# Patient Record
Sex: FEMALE | Race: WHITE | Employment: OTHER | URBAN - METROPOLITAN AREA
[De-identification: names, ages, dates, MRNs, and addresses within clinical notes are randomized per-mention and may not be internally consistent; named-entity substitution may affect disease eponyms.]

---

## 2017-02-07 ENCOUNTER — OFFICE VISIT (OUTPATIENT)
Dept: FAMILY MEDICINE | Facility: CLINIC | Age: 42
End: 2017-02-07
Payer: COMMERCIAL

## 2017-02-07 ENCOUNTER — TRANSFERRED RECORDS (OUTPATIENT)
Dept: HEALTH INFORMATION MANAGEMENT | Facility: CLINIC | Age: 42
End: 2017-02-07

## 2017-02-07 VITALS
HEIGHT: 63 IN | TEMPERATURE: 97.8 F | WEIGHT: 153 LBS | BODY MASS INDEX: 27.11 KG/M2 | DIASTOLIC BLOOD PRESSURE: 68 MMHG | RESPIRATION RATE: 16 BRPM | OXYGEN SATURATION: 99 % | SYSTOLIC BLOOD PRESSURE: 110 MMHG | HEART RATE: 83 BPM

## 2017-02-07 DIAGNOSIS — F33.0 MAJOR DEPRESSIVE DISORDER, RECURRENT EPISODE, MILD (H): ICD-10-CM

## 2017-02-07 DIAGNOSIS — E06.3 HYPOTHYROIDISM DUE TO HASHIMOTO'S THYROIDITIS: ICD-10-CM

## 2017-02-07 DIAGNOSIS — R22.9 LOCALIZED SUPERFICIAL SWELLING, MASS, OR LUMP: Primary | ICD-10-CM

## 2017-02-07 DIAGNOSIS — F10.11 ALCOHOL USE DISORDER, MILD, IN SUSTAINED REMISSION: ICD-10-CM

## 2017-02-07 PROCEDURE — 36415 COLL VENOUS BLD VENIPUNCTURE: CPT | Performed by: FAMILY MEDICINE

## 2017-02-07 PROCEDURE — 85652 RBC SED RATE AUTOMATED: CPT | Performed by: FAMILY MEDICINE

## 2017-02-07 PROCEDURE — 86431 RHEUMATOID FACTOR QUANT: CPT | Performed by: FAMILY MEDICINE

## 2017-02-07 PROCEDURE — 99213 OFFICE O/P EST LOW 20 MIN: CPT | Performed by: FAMILY MEDICINE

## 2017-02-07 NOTE — NURSING NOTE
"Chief Complaint   Patient presents with     Mass     right finger       Initial /68 mmHg  Pulse 83  Temp(Src) 97.8  F (36.6  C) (Tympanic)  Resp 16  Ht 5' 3\" (1.6 m)  Wt 153 lb (69.4 kg)  BMI 27.11 kg/m2  SpO2 99%  LMP 01/24/2017 Estimated body mass index is 27.11 kg/(m^2) as calculated from the following:    Height as of this encounter: 5' 3\" (1.6 m).    Weight as of this encounter: 153 lb (69.4 kg).  Medication Reconciliation: complete   .Car FUENTES      "

## 2017-02-07 NOTE — PROGRESS NOTES
"  SUBJECTIVE:                                                    Deysi Douglas is a 41 year old female who presents to clinic today for the following health issues:      Lump on finger      Duration: finger hurt about 6 months, in the last1 month bump has formed    Description (location/character/radiation): right finger    Intensity:  moderate    Accompanying signs and symptoms: growing in size, hurts down finger into hand    History (similar episodes/previous evaluation): None    Precipitating or alleviating factors: resting it    Therapies tried and outcome: None     41 year old healthy female with history of hypothyroidism, well controlled, and remote history of depression, anxiety and alcohol use disorder all in remission currently, here today for right finger pain.  Plays pianos, skis, types all which seem to strain finger.  For last 6 months or so has had pain in right middle finger middle joint, then had lump that developed.  Painful from time to time, occ wakes her at night.  Growing slowly, not sure if waxes and wanes?  Does have fam history of RA in maternal aunt, otherwise none.      Problem list and histories reviewed & adjusted, as indicated.  Additional history: as documented    BP Readings from Last 3 Encounters:   02/07/17 110/68   12/02/16 110/64   11/01/16 110/68    Wt Readings from Last 3 Encounters:   02/07/17 153 lb (69.4 kg)   12/02/16 154 lb (69.854 kg)   11/01/16 152 lb 3.2 oz (69.037 kg)                    ROS:  Constitutional, HEENT, cardiovascular, pulmonary, gi and gu systems are negative, except as otherwise noted.    OBJECTIVE:                                                    /68 mmHg  Pulse 83  Temp(Src) 97.8  F (36.6  C) (Tympanic)  Resp 16  Ht 5' 3\" (1.6 m)  Wt 153 lb (69.4 kg)  BMI 27.11 kg/m2  SpO2 99%  LMP 01/24/2017  Body mass index is 27.11 kg/(m^2).  GENERAL: healthy, alert and no distress  Right Hand/Wrist Exam     Range of Motion   Range of motion is " normal right wrist ROM.  Hand  DIP Thumb:  DIP Index:   DIP Middle: Normal  DIP Ring:   DIP Little:   MP Thumb:   MP Index:    MP Middle:   Normal  MP Ring:       MP Little:       PIP Index:     PIP Middle:  Normal  PIP Ring:      PIP Little:        Muscle Strength   Normal right wrist strength    Comments:  Normal ROM but with non illuminating circular hard mass about pea sized, non mobile, right middle finger PIP          Diagnostic Test Results:  Unresulted Labs Ordered in the Past 30 Days of this Admission     Date and Time Order Name Status Description    2/7/2017 1443 RHEUMATOID FACTOR In process     2/7/2017 1443 ERYTHROCYTE SEDIMENTATION RATE AUTO In process              ASSESSMENT/PLAN:                                                          ICD-10-CM    1. Localized superficial swelling, mass, or lump R22.9 XR Finger Right G/E 2 Views     ESR: Erythrocyte sedimentation rate     Rheumatoid factor   2. Hypothyroidism due to Hashimoto's thyroiditis E03.8     E06.3    3. Major depressive disorder, recurrent episode, mild (H) F33.0    4. Alcohol use disorder, mild, in sustained remission (H) F10.10      --For lump, discussed differential including ganglion cyst, heberden's node, and much less likely tumor or neoplasm.    --Will get xray - ordered, pt will come back to have done.  --Will get labs to rule out RA - doesn't seem like based on symptoms, but with family history and anxiety around this pt prefers testing.  --Will follow up with plan based on results.      Payton Horn MD  [facility]

## 2017-02-08 LAB
ERYTHROCYTE [SEDIMENTATION RATE] IN BLOOD BY WESTERGREN METHOD: 12 MM/H (ref 0–20)
RHEUMATOID FACT SER NEPH-ACNC: <20 IU/ML (ref 0–20)

## 2017-02-10 ENCOUNTER — APPOINTMENT (OUTPATIENT)
Dept: GENERAL RADIOLOGY | Facility: CLINIC | Age: 42
End: 2017-02-10
Payer: COMMERCIAL

## 2017-02-10 ENCOUNTER — RADIANT APPOINTMENT (OUTPATIENT)
Dept: GENERAL RADIOLOGY | Facility: CLINIC | Age: 42
End: 2017-02-10
Attending: FAMILY MEDICINE
Payer: COMMERCIAL

## 2017-02-10 DIAGNOSIS — M67.441 DIGITAL MUCINOUS CYST OF FINGER OF RIGHT HAND: ICD-10-CM

## 2017-02-10 PROCEDURE — 73130 X-RAY EXAM OF HAND: CPT | Mod: RT

## 2017-03-09 ENCOUNTER — TELEPHONE (OUTPATIENT)
Dept: FAMILY MEDICINE | Facility: CLINIC | Age: 42
End: 2017-03-09

## 2017-03-10 ENCOUNTER — MYC MEDICAL ADVICE (OUTPATIENT)
Dept: FAMILY MEDICINE | Facility: CLINIC | Age: 42
End: 2017-03-10

## 2017-03-10 DIAGNOSIS — E06.3 HYPOTHYROIDISM DUE TO HASHIMOTO'S THYROIDITIS: Primary | ICD-10-CM

## 2017-03-10 DIAGNOSIS — R19.7 DIARRHEA, UNSPECIFIED TYPE: ICD-10-CM

## 2017-03-10 DIAGNOSIS — E06.3 HYPOTHYROIDISM DUE TO HASHIMOTO'S THYROIDITIS: ICD-10-CM

## 2017-03-10 PROCEDURE — 36415 COLL VENOUS BLD VENIPUNCTURE: CPT | Performed by: FAMILY MEDICINE

## 2017-03-10 PROCEDURE — 84443 ASSAY THYROID STIM HORMONE: CPT | Performed by: FAMILY MEDICINE

## 2017-03-10 PROCEDURE — 83516 IMMUNOASSAY NONANTIBODY: CPT | Performed by: FAMILY MEDICINE

## 2017-03-10 NOTE — TELEPHONE ENCOUNTER
Please see [employer] message and let us know how we can help,  Mary Bill RN- Triage [employer]

## 2017-03-10 NOTE — TELEPHONE ENCOUNTER
There is a mychart encounter that has been forwarded to Dr Horn  Close encounter  Mary Bill RN- Triage [employer]

## 2017-03-10 NOTE — TELEPHONE ENCOUNTER
Could you let Deysi know I ordered those tests; she can come in for lab only?  Thanks!  Payton Horn MD

## 2017-03-13 LAB — TSH SERPL DL<=0.005 MIU/L-ACNC: 1.9 MU/L (ref 0.4–4)

## 2017-03-14 LAB
TTG IGA SER-ACNC: 1 U/ML
TTG IGG SER-ACNC: 1 U/ML

## 2017-03-14 NOTE — PROGRESS NOTES
Your thyroid results are normal - celiac still pending.  Let me know if you have any questions.  Payton Horn MD

## 2017-03-21 ENCOUNTER — TRANSFERRED RECORDS (OUTPATIENT)
Dept: HEALTH INFORMATION MANAGEMENT | Facility: CLINIC | Age: 42
End: 2017-03-21

## 2017-03-22 ENCOUNTER — OFFICE VISIT (OUTPATIENT)
Dept: FAMILY MEDICINE | Facility: CLINIC | Age: 42
End: 2017-03-22
Payer: COMMERCIAL

## 2017-03-22 VITALS
OXYGEN SATURATION: 98 % | DIASTOLIC BLOOD PRESSURE: 64 MMHG | TEMPERATURE: 97.8 F | HEART RATE: 74 BPM | WEIGHT: 151.5 LBS | HEIGHT: 63 IN | SYSTOLIC BLOOD PRESSURE: 100 MMHG | BODY MASS INDEX: 26.84 KG/M2 | RESPIRATION RATE: 16 BRPM

## 2017-03-22 DIAGNOSIS — F33.0 MAJOR DEPRESSIVE DISORDER, RECURRENT EPISODE, MILD (H): ICD-10-CM

## 2017-03-22 DIAGNOSIS — E06.3 HYPOTHYROIDISM DUE TO HASHIMOTO'S THYROIDITIS: ICD-10-CM

## 2017-03-22 DIAGNOSIS — F41.1 GAD (GENERALIZED ANXIETY DISORDER): Primary | ICD-10-CM

## 2017-03-22 PROCEDURE — 99214 OFFICE O/P EST MOD 30 MIN: CPT | Performed by: FAMILY MEDICINE

## 2017-03-22 NOTE — PROGRESS NOTES
"  SUBJECTIVE:                                                    Deysi Douglas is a 42 year old female who presents to clinic today for the following health issues:    Pt is here to go over lab results from her 3-10-17 apt    Went to see endocrinologist on Tuesday.  Saw her yesterday and she changed her levothyroxine to synthroid and upped dose slightly to 137.      42 year old female with anxiety and hypothyroidism here to discuss thyroid issues and anxiety.  Has made several diet changes she'd like to discuss, and saw endocrinologist yesterday and wants to update me on this.      Problem list and histories reviewed & adjusted, as indicated.  Additional history: as documented    BP Readings from Last 3 Encounters:   03/22/17 100/64   02/07/17 110/68   12/02/16 110/64    Wt Readings from Last 3 Encounters:   03/22/17 151 lb 8 oz (68.7 kg)   02/07/17 153 lb (69.4 kg)   12/02/16 154 lb (69.9 kg)                    Reviewed and updated as needed this visit by clinical staff  Tobacco  Allergies  Med Hx  Surg Hx  Fam Hx  Soc Hx      Reviewed and updated as needed this visit by Provider         ROS:  Constitutional, HEENT, cardiovascular, pulmonary, gi and gu systems are negative, except as otherwise noted.    OBJECTIVE:                                                    /64  Pulse 74  Temp 97.8  F (36.6  C) (Tympanic)  Resp 16  Ht 5' 3\" (1.6 m)  Wt 151 lb 8 oz (68.7 kg)  LMP 03/11/2017 (Approximate)  SpO2 98%  BMI 26.84 kg/m2  Body mass index is 26.84 kg/(m^2).  GENERAL: healthy, alert and no distress  MS: no gross musculoskeletal defects noted, no edema  SKIN: no suspicious lesions or rashes  NEURO: Normal strength and tone, mentation intact and speech normal  MENTAL STATUS EXAM:   Deysi is casually dressed and well groomed, sitting comfortably during encounter.  she is alert, awake, and in no acute distress. Eye contact is good. Speech is rapid, pleasantly tangential at baseline no more than " "usual. Psychomotor activity is within normal limits and there are no abnormal involuntary movements demonstrated. Mood is \"good but anxious\" and affect is euthymic with good range and reactivity. [place] is generallypositive and hopeful despite recent stressors. Thought process is linear, logical, and goal directed. Thought content is free of suicidal or homicidal ideation, hallucinations or other symptoms of psychosis. Insight and judgement are fair to good. she is generally oriented. No difficulty with memory, attention, or cognition. Associations intact, gait and station within normal limits.       ASSESSMENT/PLAN:                                                      Deysi was seen today for lab only.    Diagnoses and all orders for this visit:    MARCELO (generalized anxiety disorder)    Hypothyroidism due to Hashimoto's thyroiditis    Major depressive disorder, recurrent episode, mild (H)      Plan:   --Continue your levothyroxine as you are.   --Work on CBT techniques for anxiety - question your thoughts/worries reality, deep breathing, stay in moment, etc.   --Gave her a few resource/books for dealing with anxiety.   --Follow up with us as needed.  Pt NOT interested in medication change although I certainly think an SSRI could be helpful for her rather than her wellbutrin, or even buspar.  Will continue to discuss as pt's story and coping mechanisms evolve.   --Recommend therapy, yoga, meditation.      Greater than 25 minutes were spent on this visit, of which more than 50% was spent on counseling and coordination of care.  Please see plan above.    Payton Horn MD  [facility]    "

## 2017-03-22 NOTE — MR AVS SNAPSHOT
After Visit Summary   3/22/2017    Deysi Douglas    MRN: [id number]           Patient Information     Date Of Birth          1975        Visit Information        Provider Department      3/22/2017 1:15 PM Payton Horn MD [facility]        Today's Diagnoses     MARCELO (generalized anxiety disorder)    -  1    Hypothyroidism due to Hashimoto's thyroiditis        Major depressive disorder, recurrent episode, mild (H)           Follow-ups after your visit        Who to contact     If you have questions or need follow up information about today's clinic visit or your schedule please contact [facility] directly at [phone].  Normal or non-critical lab and imaging results will be communicated to you by MyChart, letter or phone within 4 business days after the clinic has received the results. If you do not hear from us within 7 days, please contact the clinic through [employer] or phone. If you have a critical or abnormal lab result, we will notify you by phone as soon as possible.  Submit refill requests through [employer] or call your pharmacy and they will forward the refill request to us. Please allow 3 business days for your refill to be completed.          Additional Information About Your Visit        MyChart Information     [employer] gives you secure access to your electronic health record. If you see a primary care provider, you can also send messages to your care team and make appointments. If you have questions, please call your primary care clinic.  If you do not have a primary care provider, please call [phone] and they will assist you.        Care EveryWhere ID     This is your Care EveryWhere ID. This could be used by other organizations to access your [facility] medical records  [id number]        Your Vitals Were     Pulse Temperature Respirations Height Last Period Pulse Oximetry    74 97.8  F (36.6  C)  "(Tympanic) 16 5' 3\" (1.6 m) 03/11/2017 (Approximate) 98%    BMI (Body Mass Index)                   26.84 kg/m2            Blood Pressure from Last 3 Encounters:   03/22/17 100/64   02/07/17 110/68   12/02/16 110/64    Weight from Last 3 Encounters:   03/22/17 151 lb 8 oz (68.7 kg)   02/07/17 153 lb (69.4 kg)   12/02/16 154 lb (69.9 kg)              Today, you had the following     No orders found for display       Primary Care Provider Office Phone # Fax #    Payton Horn [phone]       [address] [address]        Thank you!     Thank you for choosing [facility]  for your care. Our goal is always to provide you with excellent care. Hearing back from our patients is one way we can continue to improve our services. Please take a few minutes to complete the written survey that you may receive in the mail after your visit with us. Thank you!             Your Updated Medication List - Protect others around you: Learn how to safely use, store and throw away your medicines at www.disposemymeds.org.          This list is accurate as of: 3/22/17 11:59 PM.  Always use your most recent med list.                   Brand Name Dispense Instructions for use    ALPRAZolam 0.5 MG tablet    XANAX    30 tablet    Take 1 tablet (0.5 mg) by mouth 3 times daily as needed for anxiety       buPROPion 100 MG 12 hr tablet    WELLBUTRIN SR    180 tablet    Take 1 tablet (100 mg) by mouth 2 times daily       calcium + D 600-200 MG-UNIT Tabs   Generic drug:  calcium carbonate-vitamin D      2 TABLETS DAILY       fluticasone 50 MCG/ACT spray    FLONASE    16 g    Spray 1-2 sprays into both nostrils daily       levothyroxine 125 MCG tablet    SYNTHROID/LEVOTHROID    90 tablet    Take 1 tablet (125 mcg) by mouth daily       ZYRTEC 10 MG tablet   Generic drug:  cetirizine      1 TABLET DAILY         "

## 2017-03-22 NOTE — NURSING NOTE
"Chief Complaint   Patient presents with     Lab Only     results from 3-10-17       Initial /64  Pulse 74  Temp 97.8  F (36.6  C) (Tympanic)  Resp 16  Ht 5' 3\" (1.6 m)  Wt 151 lb 8 oz (68.7 kg)  LMP 03/11/2017 (Approximate)  SpO2 98%  BMI 26.84 kg/m2 Estimated body mass index is 26.84 kg/(m^2) as calculated from the following:    Height as of this encounter: 5' 3\" (1.6 m).    Weight as of this encounter: 151 lb 8 oz (68.7 kg).  Medication Reconciliation: complete   .Car FUENTES      "

## 2017-05-08 ENCOUNTER — TELEPHONE (OUTPATIENT)
Dept: FAMILY MEDICINE | Facility: CLINIC | Age: 42
End: 2017-05-08

## 2017-05-08 DIAGNOSIS — F41.1 GAD (GENERALIZED ANXIETY DISORDER): Primary | ICD-10-CM

## 2017-05-08 NOTE — TELEPHONE ENCOUNTER
I spoke with patient  'having worse anxiety( situation in requards to buying a house and remorse in not doing so) and now anxiety   to clinic tomorrow for eval

## 2017-05-08 NOTE — TELEPHONE ENCOUNTER
Deysi asked to speak to you directly as she is having some problems with mental health issues and needed some advice. She can be reached at [phone].

## 2017-05-09 ENCOUNTER — HOSPITAL ENCOUNTER (EMERGENCY)
Facility: CLINIC | Age: 42
Discharge: HOME | End: 2017-05-09
Attending: PSYCHIATRY & NEUROLOGY | Admitting: PSYCHIATRY & NEUROLOGY
Payer: COMMERCIAL

## 2017-05-09 ENCOUNTER — TELEPHONE (OUTPATIENT)
Dept: FAMILY MEDICINE | Facility: CLINIC | Age: 42
End: 2017-05-09

## 2017-05-09 ENCOUNTER — OFFICE VISIT (OUTPATIENT)
Dept: FAMILY MEDICINE | Facility: CLINIC | Age: 42
End: 2017-05-09
Payer: COMMERCIAL

## 2017-05-09 VITALS
WEIGHT: 140.9 LBS | HEIGHT: 63 IN | SYSTOLIC BLOOD PRESSURE: 110 MMHG | OXYGEN SATURATION: 100 % | TEMPERATURE: 97.6 F | HEART RATE: 92 BPM | BODY MASS INDEX: 24.96 KG/M2 | DIASTOLIC BLOOD PRESSURE: 66 MMHG

## 2017-05-09 VITALS
WEIGHT: 141.8 LBS | OXYGEN SATURATION: 99 % | RESPIRATION RATE: 16 BRPM | TEMPERATURE: 97.7 F | BODY MASS INDEX: 25.12 KG/M2 | DIASTOLIC BLOOD PRESSURE: 80 MMHG | SYSTOLIC BLOOD PRESSURE: 128 MMHG | HEART RATE: 94 BPM

## 2017-05-09 DIAGNOSIS — F41.1 GAD (GENERALIZED ANXIETY DISORDER): ICD-10-CM

## 2017-05-09 DIAGNOSIS — F41.8 DEPRESSION WITH ANXIETY: ICD-10-CM

## 2017-05-09 DIAGNOSIS — F43.0 ACUTE REACTION TO STRESS: Primary | ICD-10-CM

## 2017-05-09 LAB
AMPHETAMINES UR QL SCN: ABNORMAL
BARBITURATES UR QL: ABNORMAL
BENZODIAZ UR QL: ABNORMAL
CANNABINOIDS UR QL SCN: ABNORMAL
COCAINE UR QL: ABNORMAL
ETHANOL UR QL SCN: ABNORMAL
HCG UR QL: NEGATIVE
OPIATES UR QL SCN: ABNORMAL

## 2017-05-09 PROCEDURE — 90791 PSYCH DIAGNOSTIC EVALUATION: CPT

## 2017-05-09 PROCEDURE — 99285 EMERGENCY DEPT VISIT HI MDM: CPT | Mod: 25 | Performed by: PSYCHIATRY & NEUROLOGY

## 2017-05-09 PROCEDURE — 81025 URINE PREGNANCY TEST: CPT | Performed by: EMERGENCY MEDICINE

## 2017-05-09 PROCEDURE — 80320 DRUG SCREEN QUANTALCOHOLS: CPT | Performed by: EMERGENCY MEDICINE

## 2017-05-09 PROCEDURE — 80307 DRUG TEST PRSMV CHEM ANLYZR: CPT | Performed by: EMERGENCY MEDICINE

## 2017-05-09 PROCEDURE — 99284 EMERGENCY DEPT VISIT MOD MDM: CPT | Mod: Z6 | Performed by: PSYCHIATRY & NEUROLOGY

## 2017-05-09 PROCEDURE — 99213 OFFICE O/P EST LOW 20 MIN: CPT | Performed by: FAMILY MEDICINE

## 2017-05-09 RX ORDER — BUPROPION HYDROCHLORIDE 100 MG/1
100 TABLET, EXTENDED RELEASE ORAL 2 TIMES DAILY
Qty: 180 TABLET | Refills: 1 | Status: SHIPPED | OUTPATIENT
Start: 2017-05-09 | End: 2017-06-20

## 2017-05-09 RX ORDER — LORAZEPAM 1 MG/1
1 TABLET ORAL EVERY 8 HOURS PRN
Qty: 20 TABLET | Refills: 0 | Status: SHIPPED | OUTPATIENT
Start: 2017-05-09 | End: 2017-09-29

## 2017-05-09 RX ORDER — BUPROPION HYDROCHLORIDE 100 MG/1
100 TABLET, EXTENDED RELEASE ORAL 3 TIMES DAILY
Qty: 90 TABLET | Refills: 0 | Status: SHIPPED | OUTPATIENT
Start: 2017-05-09 | End: 2017-06-20

## 2017-05-09 ASSESSMENT — ENCOUNTER SYMPTOMS
HALLUCINATIONS: 0
SHORTNESS OF BREATH: 0
FEVER: 0
DIZZINESS: 0
DYSPHORIC MOOD: 0
CHEST TIGHTNESS: 0
BACK PAIN: 0
NERVOUS/ANXIOUS: 1
ABDOMINAL PAIN: 0

## 2017-05-09 ASSESSMENT — ANXIETY QUESTIONNAIRES
1. FEELING NERVOUS, ANXIOUS, OR ON EDGE: NEARLY EVERY DAY
2. NOT BEING ABLE TO STOP OR CONTROL WORRYING: NEARLY EVERY DAY
3. WORRYING TOO MUCH ABOUT DIFFERENT THINGS: NEARLY EVERY DAY
7. FEELING AFRAID AS IF SOMETHING AWFUL MIGHT HAPPEN: NEARLY EVERY DAY
6. BECOMING EASILY ANNOYED OR IRRITABLE: NEARLY EVERY DAY
IF YOU CHECKED OFF ANY PROBLEMS ON THIS QUESTIONNAIRE, HOW DIFFICULT HAVE THESE PROBLEMS MADE IT FOR YOU TO DO YOUR WORK, TAKE CARE OF THINGS AT HOME, OR GET ALONG WITH OTHER PEOPLE: EXTREMELY DIFFICULT
5. BEING SO RESTLESS THAT IT IS HARD TO SIT STILL: NEARLY EVERY DAY
GAD7 TOTAL SCORE: 21

## 2017-05-09 ASSESSMENT — PATIENT HEALTH QUESTIONNAIRE - PHQ9: 5. POOR APPETITE OR OVEREATING: NEARLY EVERY DAY

## 2017-05-09 NOTE — ED AVS SNAPSHOT
[facility], Emergency Department    [address]    [facility] [address]    Phone:  [phone]    Fax:  [phone]                                       Deysi Douglas   MRN: [id number]    Department:  [facility], Emergency Department   Date of Visit:  5/9/2017           After Visit Summary Signature Page     I have received my discharge instructions, and my questions have been answered. I have discussed any challenges I see with this plan with the nurse or doctor.    ..........................................................................................................................................  Patient/Patient Representative Signature      ..........................................................................................................................................  Patient Representative Print Name and Relationship to Patient    ..................................................               ................................................  Date                                            Time    ..........................................................................................................................................  Reviewed by Signature/Title    ...................................................              ..............................................  Date                                                            Time

## 2017-05-09 NOTE — PROGRESS NOTES
"  SUBJECTIVE:                                                    Deysi Douglas is a 42 year old female who presents to clinic today for the following health issues:        Depression and Anxiety Follow-Up    Status since last visit: Worsened over the past 2 weeks ( felt great before that) with anxiety over considering buying new house, then felt overwhelmed by hte porcess, ended up canceling the purchase, now with regret    Denies  suicidal ideation, now more dressed than anxious    Other associated symptoms: no chemical use    Complicating factors:     Significant life event: No     Current substance abuse: None  Seeing therapist, trying to walk  Taking her we;llbutrin  PHQ-9 SCORE 6/7/2016 6/7/2016 12/2/2016   Total Score - - -   Total Score 2 3 1     MARCELO-7 SCORE 4/12/2016   Total Score 6        PHQ-9  English      PHQ-9   Any Language     GAD7       Amount of exercise or physical activity: everyday     Problems taking medications regularly: No    Medication side effects: none    Diet: regular (no restrictions)          Problem list and histories reviewed & adjusted, as indicated.  Additional history: as documented    Labs reviewed in EPIC    Reviewed and updated as needed this visit by clinical staff       Reviewed and updated as needed this visit by Provider         ROS:  Constitutional, HEENT, cardiovascular, pulmonary, gi and gu systems are negative, except as otherwise noted.    OBJECTIVE:                                                    /66  Pulse 92  Temp 97.6  F (36.4  C) (Oral)  Ht 5' 3\" (1.6 m)  Wt 140 lb 14.4 oz (63.9 kg)  SpO2 100%  BMI 24.96 kg/m2  Body mass index is 24.96 kg/(m^2).  GENERAL: alert and fatigued  EYES: Eyes grossly normal to inspection, PERRL and conjunctivae and sclerae normal  PSYCH: affect flat, tearful, anxious, fatigued and judgement and insight intact         ASSESSMENT/PLAN:                                                            1. Depression with " anxiety  With recent worsening, denies suicidal iddeation and agreees to no harm contract  -Ok short term use of LORazepam (ATIVAN) 1 MG tablet; Take 1 tablet (1 mg) by mouth every 8 hours as needed for anxiety  Dispense: 20 tablet; Refill: 0     - buPROPion (WELLBUTRIN SR) 100 MG 12 hr tablet; Take 1 tablet (100 mg) by mouth 3 times daily  Dispense: 90 tablet; Refill: 0    2. Acute reaction to stress  Urged therapy, exercise,     Call if worse  Follow up in 2 weeks.     Glenroy Trammell MD  [facility]      After visit patient called to state that she was in fact not taking her wellbutrin  Will doa taper , one tab daily times 3 days, then one tab bid for 3 days then 1 tid

## 2017-05-09 NOTE — ED AVS SNAPSHOT
[facility], Emergency Department    [address]    [place] [address]    Phone:  [phone]    Fax:  [phone]                                       Deysi Douglas   MRN: [id number]    Department:  [facility], Emergency Department   Date of Visit:  5/9/2017           Patient Information     Date Of Birth          1975        Your diagnoses for this visit were:     MARCELO (generalized anxiety disorder)        You were seen by Haroon Smith MD.        Discharge Instructions       Continue to see your therapist and primary MD    Restart wellbutrin as already prescribed    Future Appointments        Provider Department Dept Phone Center    5/22/2017 2:20 PM Payton Horn MD [facility] [phone] [place]    5/23/2017 11:30 AM Glenroy Trammell MD [facility] [phone] [facility]      24 Hour Appointment Hotline       To make an appointment at any [facility], call [phone] ([phone]). If you don't have a family doctor or clinic, we will help you find one. [facility] clinics are conveniently located to serve the needs of you and your family.             Review of your medicines      Our records show that you are taking the medicines listed below. If these are incorrect, please call your family doctor or clinic.        Dose / Directions Last dose taken    ALPRAZolam 0.5 MG tablet   Commonly known as:  XANAX   Dose:  0.5 mg   Quantity:  30 tablet        Take 1 tablet (0.5 mg) by mouth 3 times daily as needed for anxiety   Refills:  0        * buPROPion 100 MG 12 hr tablet   Commonly known as:  WELLBUTRIN SR   Dose:  100 mg   Quantity:  180 tablet        Take 1 tablet (100 mg) by mouth 2 times daily   Refills:  1        * buPROPion 100 MG 12 hr tablet   Commonly known as:  WELLBUTRIN SR   Dose:  100 mg   Quantity:  90 tablet        Take 1 tablet (100 mg) by mouth 3 times daily   Refills:  0        calcium + D 600-200  MG-UNIT Tabs   Generic drug:  calcium carbonate-vitamin D        2 TABLETS DAILY   Refills:  0        fluticasone 50 MCG/ACT spray   Commonly known as:  FLONASE   Dose:  1-2 spray   Quantity:  16 g        Spray 1-2 sprays into both nostrils daily   Refills:  1        levothyroxine 125 MCG tablet   Commonly known as:  SYNTHROID/LEVOTHROID   Dose:  125 mcg   Quantity:  90 tablet        Take 1 tablet (125 mcg) by mouth daily   Refills:  3        LORazepam 1 MG tablet   Commonly known as:  ATIVAN   Dose:  1 mg   Quantity:  20 tablet        Take 1 tablet (1 mg) by mouth every 8 hours as needed for anxiety   Refills:  0        ZYRTEC 10 MG tablet   Generic drug:  cetirizine        1 TABLET DAILY   Refills:  0        * Notice:  This list has 2 medication(s) that are the same as other medications prescribed for you. Read the directions carefully, and ask your doctor or other care provider to review them with you.            Procedures and tests performed during your visit     Drug abuse screen 6 urine (tox)    HCG qualitative urine      Orders Needing Specimen Collection     None      Pending Results     No orders found from 5/7/2017 to 5/10/2017.            Pending Culture Results     No orders found from 5/7/2017 to 5/10/2017.            Pending Results Instructions     If you had any lab results that were not finalized at the time of your Discharge, you can call the ED Lab Result RN at [phone]. You will be contacted by this team for any positive Lab results or changes in treatment. The nurses are available 7 days a week from 10A to 6:30P.  You can leave a message 24 hours per day and they will return your call.        Thank you for choosing [facility]       Thank you for choosing [facility] for your care. Our goal is always to provide you with excellent care. Hearing back from our patients is one way we can continue to improve our services. Please take a few minutes to complete the written survey that you may receive in  the mail after you visit with us. Thank you!        [employer] Information     [employer] gives you secure access to your electronic health record. If you see a primary care provider, you can also send messages to your care team and make appointments. If you have questions, please call your primary care clinic.  If you do not have a primary care provider, please call [phone] and they will assist you.        Care EveryWhere ID     This is your Care EveryWhere ID. This could be used by other organizations to access your [facility] medical records  [id number]        After Visit Summary       This is your record. Keep this with you and show to your community pharmacist(s) and doctor(s) at your next visit.

## 2017-05-09 NOTE — MR AVS SNAPSHOT
After Visit Summary   5/9/2017    Deysi Douglas    MRN: [id number]           Patient Information     Date Of Birth          1975        Visit Information        Provider Department      5/9/2017 11:15 AM Glenroy Trammell MD [facility]        Today's Diagnoses     Acute reaction to stress    -  1    Depression with anxiety           Follow-ups after your visit        Your next 10 appointments already scheduled     May 22, 2017  2:20 PM CDT   Office Visit with Payton Horn MD   [facility]    [facility] [facility]  [address]  [facility] [address] [phone]           Bring a current list of meds and any records pertaining to this visit.  For Physicals, please bring immunization records and any forms needing to be filled out.  Please arrive 10 minutes early to complete paperwork.            May 23, 2017 11:30 AM CDT   Office Visit with Glenroy Trammell MD   [facility] ([facility])    [address] [place] [address] [address]  [facility] [id number] [phone]           Bring a current list of meds and any records pertaining to this visit.  For Physicals, please bring immunization records and any forms needing to be filled out.  Please arrive 10 minutes early to complete paperwork.              Who to contact     If you have questions or need follow up information about today's clinic visit or your schedule please contact [facility] directly at [phone].  Normal or non-critical lab and imaging results will be communicated to you by MyChart, letter or phone within 4 business days after the clinic has received the results. If you do not hear from us within 7 days, please contact the clinic through MyChart or phone. If you have a critical or abnormal lab result, we will notify you by phone as soon as possible.  Submit refill  "requests through [employer] or call your pharmacy and they will forward the refill request to us. Please allow 3 business days for your refill to be completed.          Additional Information About Your Visit        ideasofthart Information     [employer] gives you secure access to your electronic health record. If you see a primary care provider, you can also send messages to your care team and make appointments. If you have questions, please call your primary care clinic.  If you do not have a primary care provider, please call [phone] and they will assist you.        Care EveryWhere ID     This is your Care EveryWhere ID. This could be used by other organizations to access your [facility] medical records  [id number]        Your Vitals Were     Pulse Temperature Height Pulse Oximetry BMI (Body Mass Index)       92 97.6  F (36.4  C) (Oral) 5' 3\" (1.6 m) 100% 24.96 kg/m2        Blood Pressure from Last 3 Encounters:   05/09/17 110/66   03/22/17 100/64   02/07/17 110/68    Weight from Last 3 Encounters:   05/09/17 140 lb 14.4 oz (63.9 kg)   03/22/17 151 lb 8 oz (68.7 kg)   02/07/17 153 lb (69.4 kg)              We Performed the Following     DEPRESSION ACTION PLAN (DAP)          Today's Medication Changes          These changes are accurate as of: 5/9/17  1:07 PM.  If you have any questions, ask your nurse or doctor.               Start taking these medicines.        Dose/Directions    LORazepam 1 MG tablet   Commonly known as:  ATIVAN   Used for:  Depression with anxiety   Started by:  Glenroy Trammell MD        Dose:  1 mg   Take 1 tablet (1 mg) by mouth every 8 hours as needed for anxiety   Quantity:  20 tablet   Refills:  0         These medicines have changed or have updated prescriptions.        Dose/Directions    * buPROPion 100 MG 12 hr tablet   Commonly known as:  WELLBUTRIN SR   This may have changed:  Another medication with the same name was added. Make sure you understand how and when to take each. "   Used for:  Depression with anxiety   Changed by:  Glenroy Trammell MD        Dose:  100 mg   Take 1 tablet (100 mg) by mouth 2 times daily   Quantity:  180 tablet   Refills:  1       * buPROPion 100 MG 12 hr tablet   Commonly known as:  WELLBUTRIN SR   This may have changed:  You were already taking a medication with the same name, and this prescription was added. Make sure you understand how and when to take each.   Used for:  Depression with anxiety   Changed by:  Glenroy Trammell MD        Dose:  100 mg   Take 1 tablet (100 mg) by mouth 3 times daily   Quantity:  90 tablet   Refills:  0       * Notice:  This list has 2 medication(s) that are the same as other medications prescribed for you. Read the directions carefully, and ask your doctor or other care provider to review them with you.         Where to get your medicines      These medications were sent to [email or web address] [address] [place] [address] AT [address] [address]     Phone:  [phone]     buPROPion 100 MG 12 hr tablet    buPROPion 100 MG 12 hr tablet         Some of these will need a paper prescription and others can be bought over the counter.  Ask your nurse if you have questions.     Bring a paper prescription for each of these medications     LORazepam 1 MG tablet                Primary Care Provider Office Phone # Fax #    Payton Horn [phone]       [address] [address]        Thank you!     Thank you for choosing [facility]  for your care. Our goal is always to provide you with excellent care. Hearing back from our patients is one way we can continue to improve our services. Please take a few minutes to complete the written survey that you may receive in the mail after your visit with us. Thank you!             Your Updated Medication List - Protect others around you: Learn how to safely  use, store and throw away your medicines at www.disposemymeds.org.          This list is accurate as of: 5/9/17  1:07 PM.  Always use your most recent med list.                   Brand Name Dispense Instructions for use    ALPRAZolam 0.5 MG tablet    XANAX    30 tablet    Take 1 tablet (0.5 mg) by mouth 3 times daily as needed for anxiety       * buPROPion 100 MG 12 hr tablet    WELLBUTRIN SR    180 tablet    Take 1 tablet (100 mg) by mouth 2 times daily       * buPROPion 100 MG 12 hr tablet    WELLBUTRIN SR    90 tablet    Take 1 tablet (100 mg) by mouth 3 times daily       calcium + D 600-200 MG-UNIT Tabs   Generic drug:  calcium carbonate-vitamin D      2 TABLETS DAILY       fluticasone 50 MCG/ACT spray    FLONASE    16 g    Spray 1-2 sprays into both nostrils daily       levothyroxine 125 MCG tablet    SYNTHROID/LEVOTHROID    90 tablet    Take 1 tablet (125 mcg) by mouth daily       LORazepam 1 MG tablet    ATIVAN    20 tablet    Take 1 tablet (1 mg) by mouth every 8 hours as needed for anxiety       ZYRTEC 10 MG tablet   Generic drug:  cetirizine      1 TABLET DAILY       * Notice:  This list has 2 medication(s) that are the same as other medications prescribed for you. Read the directions carefully, and ask your doctor or other care provider to review them with you.

## 2017-05-10 ASSESSMENT — ANXIETY QUESTIONNAIRES: GAD7 TOTAL SCORE: 21

## 2017-05-10 ASSESSMENT — PATIENT HEALTH QUESTIONNAIRE - PHQ9: SUM OF ALL RESPONSES TO PHQ QUESTIONS 1-9: 27

## 2017-05-10 NOTE — ED PROVIDER NOTES
History     Chief Complaint   Patient presents with     Anxiety     increased anxiety and depresion     The history is provided by the patient, medical records and a friend.     Deysi Douglas is a 42 year old female who comes in due to her wanting to make sure she was doing all she could to help herself. She has a history of MARCELO and got anxious about looking for a new house.  They passed on it but now she feels she may have made a bad decision.  This seemed to trigger some past traumas which then made her non-functional for a week.  She could not leave bed, did not eat and was having constant ruminations.  She lost 15 pounds.  She saw her therapist during this time (she has been with him for 15 years) but she felt he did not do much for her.  She wonders if he is the right therapist for her.  She had stopped wellbutrin that has been helpful for her 3 to 4 months ago because she was doing so well.  She saw her primary MD today and is going to restart that.  She has some mild racing thoughts currently but feels much better than she did last week.  She is not suicidal now nor was she ever suicidal during this episode.    Please see the 's assessment for further details.    I have reviewed the Medications, Allergies, Past Medical and Surgical History, and Social History in the Epic system.    Review of Systems   Constitutional: Negative for fever.   Eyes: Negative for visual disturbance.   Respiratory: Negative for chest tightness and shortness of breath.    Cardiovascular: Negative for chest pain.   Gastrointestinal: Negative for abdominal pain.   Musculoskeletal: Negative for back pain.   Neurological: Negative for dizziness.   Psychiatric/Behavioral: Negative for dysphoric mood, hallucinations, self-injury and suicidal ideas. The patient is nervous/anxious.    All other systems reviewed and are negative.      Physical Exam   BP: 119/75  Pulse: 86  Temp: 97.3  F (36.3  C)  Resp: 16  Weight: 64.3 kg (141  lb 12.8 oz)  SpO2: 100 %  Physical Exam   Constitutional: She is oriented to person, place, and time. She appears well-developed and well-nourished.   HENT:   Head: Normocephalic and atraumatic.   Mouth/Throat: Oropharynx is clear and moist.   Eyes: Pupils are equal, round, and reactive to light.   Neck: Normal range of motion. Neck supple.   Cardiovascular: Normal rate, regular rhythm and normal heart sounds.    Pulmonary/Chest: Effort normal and breath sounds normal.   Abdominal: Soft. Bowel sounds are normal.   Musculoskeletal: Normal range of motion.   Neurological: She is alert and oriented to person, place, and time.   Skin: Skin is warm and dry.   Psychiatric: Her speech is normal and behavior is normal. Judgment and thought content normal. Her mood appears anxious. She is not actively hallucinating. Thought content is not paranoid and not delusional. Cognition and memory are normal. She exhibits a depressed mood. She expresses no homicidal and no suicidal ideation. She expresses no suicidal plans and no homicidal plans.   Deysi is a 41 y/o female who looks her age.  She is well groomed with good eye contact.   Nursing note and vitals reviewed.      ED Course     ED Course     Procedures               Labs Ordered and Resulted from Time of ED Arrival Up to the Time of Departure from the ED   DRUG ABUSE SCREEN 6 CHEM DEP URINE ([facility]) - Abnormal; Notable for the following:        Result Value    Benzodiazepine Qual Urine   (*)     Value: Positive   Cutoff for a positive benzodiazepine is greater than 200 ng/mL. This is an   unconfirmed screening result to be used for medical purposes only.      All other components within normal limits   HCG QUALITATIVE URINE            Assessments & Plan (with Medical Decision Making)   Deysi will be discharged home.  She is not an imminent risk to herself or others.  She will follow up with her established providers.  She and her friend were given reassurance that they are  doing all they can and that they have been doing the right things.  She will restart the wellbutrin as her provider has instructed her to.      I have reviewed the nursing notes.    I have reviewed the findings, diagnosis, plan and need for follow up with the patient.    New Prescriptions    No medications on file       Final diagnoses:   MARCELO (generalized anxiety disorder)       5/9/2017   [facility] [place], EMERGENCY DEPARTMENT     Haroon Smith MD  05/09/17 [id number]

## 2017-05-10 NOTE — ED NOTES
Patient arrives to [facility]. Psych Associate explains process, gives patient urine cup and questionnaire. Patient told about meeting with Mental Health  and Psychiatrist. Patient told about 2-5 hour time frame for complete evaluation.

## 2017-05-11 ENCOUNTER — TELEPHONE (OUTPATIENT)
Dept: FAMILY MEDICINE | Facility: CLINIC | Age: 42
End: 2017-05-11

## 2017-05-11 NOTE — TELEPHONE ENCOUNTER
I called to check in on patient  Was seen at [facility]   has appt to see a psychiatrist 2 weeks from now  Is feeling somewhat better back on Wellbutrin  No suicidal ideation  Agrees to call if any concerns

## 2017-05-25 ENCOUNTER — MYC MEDICAL ADVICE (OUTPATIENT)
Dept: FAMILY MEDICINE | Facility: CLINIC | Age: 42
End: 2017-05-25

## 2017-05-25 NOTE — TELEPHONE ENCOUNTER
I reviewed labs / medications with /patient  will cut back dose of synthroid as per endocrine  Follow up with consultant as planned.

## 2017-05-25 NOTE — TELEPHONE ENCOUNTER
Dr. Trammell,     Please see patient message below.     Her number is: [phone]      Nara Carlos RN  [facility]

## 2017-05-31 ENCOUNTER — OFFICE VISIT (OUTPATIENT)
Dept: FAMILY MEDICINE | Facility: CLINIC | Age: 42
End: 2017-05-31
Payer: COMMERCIAL

## 2017-05-31 VITALS
TEMPERATURE: 97.5 F | HEART RATE: 76 BPM | DIASTOLIC BLOOD PRESSURE: 60 MMHG | WEIGHT: 140.5 LBS | BODY MASS INDEX: 24.89 KG/M2 | OXYGEN SATURATION: 98 % | HEIGHT: 63 IN | SYSTOLIC BLOOD PRESSURE: 118 MMHG | RESPIRATION RATE: 16 BRPM

## 2017-05-31 DIAGNOSIS — E06.3 HYPOTHYROIDISM DUE TO HASHIMOTO'S THYROIDITIS: Primary | ICD-10-CM

## 2017-05-31 DIAGNOSIS — F33.0 MAJOR DEPRESSIVE DISORDER, RECURRENT EPISODE, MILD (H): ICD-10-CM

## 2017-05-31 DIAGNOSIS — F41.1 GAD (GENERALIZED ANXIETY DISORDER): ICD-10-CM

## 2017-05-31 PROCEDURE — 99214 OFFICE O/P EST MOD 30 MIN: CPT | Performed by: FAMILY MEDICINE

## 2017-05-31 RX ORDER — BUPROPION HYDROCHLORIDE 450 MG/1
150 TABLET, FILM COATED, EXTENDED RELEASE ORAL DAILY
COMMUNITY
End: 2017-09-29

## 2017-05-31 NOTE — MR AVS SNAPSHOT
After Visit Summary   5/31/2017    Deysi Douglas    MRN: [id number]           Patient Information     Date Of Birth          1975        Visit Information        Provider Department      5/31/2017 11:20 AM Payton Horn MD [facility]        Today's Diagnoses     Hypothyroidism due to Hashimoto's thyroiditis    -  1    MARCELO (generalized anxiety disorder)        Major depressive disorder, recurrent episode, mild (H)           Follow-ups after your visit        Who to contact     If you have questions or need follow up information about today's clinic visit or your schedule please contact [facility] directly at [phone].  Normal or non-critical lab and imaging results will be communicated to you by MyChart, letter or phone within 4 business days after the clinic has received the results. If you do not hear from us within 7 days, please contact the clinic through [employer] or phone. If you have a critical or abnormal lab result, we will notify you by phone as soon as possible.  Submit refill requests through [employer] or call your pharmacy and they will forward the refill request to us. Please allow 3 business days for your refill to be completed.          Additional Information About Your Visit        MyChart Information     [employer] gives you secure access to your electronic health record. If you see a primary care provider, you can also send messages to your care team and make appointments. If you have questions, please call your primary care clinic.  If you do not have a primary care provider, please call [phone] and they will assist you.        Care EveryWhere ID     This is your Care EveryWhere ID. This could be used by other organizations to access your [facility] medical records  [id number]        Your Vitals Were     Pulse Temperature Respirations Height Last Period Pulse Oximetry    76 97.5  F (36.4  C)  "(Tympanic) 16 5' 3\" (1.6 m) 05/26/2017 (Approximate) 98%    BMI (Body Mass Index)                   24.89 kg/m2            Blood Pressure from Last 3 Encounters:   06/09/17 100/64   05/31/17 118/60   05/09/17 128/80    Weight from Last 3 Encounters:   06/09/17 140 lb 8 oz (63.7 kg)   05/31/17 140 lb 8 oz (63.7 kg)   05/09/17 141 lb 12.8 oz (64.3 kg)              Today, you had the following     No orders found for display         Today's Medication Changes          These changes are accurate as of: 5/31/17 11:59 PM.  If you have any questions, ask your nurse or doctor.               These medicines have changed or have updated prescriptions.        Dose/Directions    levothyroxine 125 MCG tablet   Commonly known as:  SYNTHROID/LEVOTHROID   This may have changed:  how much to take   Used for:  Hypothyroidism        Dose:  125 mcg   Take 1 tablet (125 mcg) by mouth daily   Quantity:  90 tablet   Refills:  3       LORazepam 1 MG tablet   Commonly known as:  ATIVAN   This may have changed:  how much to take   Used for:  Depression with anxiety        Dose:  1 mg   Take 1 tablet (1 mg) by mouth every 8 hours as needed for anxiety   Quantity:  20 tablet   Refills:  0                Primary Care Provider Office Phone # Fax #    Glenroy Reinaldo Trammell [phone]       [address] [address]        Thank you!     Thank you for choosing [facility]  for your care. Our goal is always to provide you with excellent care. Hearing back from our patients is one way we can continue to improve our services. Please take a few minutes to complete the written survey that you may receive in the mail after your visit with us. Thank you!             Your Updated Medication List - Protect others around you: Learn how to safely use, store and throw away your medicines at www.disposemymeds.org.          This list is accurate as of: 5/31/17 11:59 PM. "  Always use your most recent med list.                   Brand Name Dispense Instructions for use    ALPRAZolam 0.5 MG tablet    XANAX    30 tablet    Take 1 tablet (0.5 mg) by mouth 3 times daily as needed for anxiety       * BuPROPion HCl ER (XL) 450 MG Tb24      Take 150 mg by mouth daily       * buPROPion 100 MG 12 hr tablet    WELLBUTRIN SR    180 tablet    Take 1 tablet (100 mg) by mouth 2 times daily       * buPROPion 100 MG 12 hr tablet    WELLBUTRIN SR    90 tablet    Take 1 tablet (100 mg) by mouth 3 times daily       calcium + D 600-200 MG-UNIT Tabs   Generic drug:  calcium carbonate-vitamin D      2 TABLETS DAILY       fluticasone 50 MCG/ACT spray    FLONASE    16 g    Spray 1-2 sprays into both nostrils daily       levothyroxine 125 MCG tablet    SYNTHROID/LEVOTHROID    90 tablet    Take 1 tablet (125 mcg) by mouth daily       LORazepam 1 MG tablet    ATIVAN    20 tablet    Take 1 tablet (1 mg) by mouth every 8 hours as needed for anxiety       ZYRTEC 10 MG tablet   Generic drug:  cetirizine      1 TABLET DAILY       * Notice:  This list has 3 medication(s) that are the same as other medications prescribed for you. Read the directions carefully, and ask your doctor or other care provider to review them with you.

## 2017-05-31 NOTE — PROGRESS NOTES
"  SUBJECTIVE:                                                    Deysi Douglas is a 42 year old female who presents to clinic today for the following health issues:      Anxiety Follow-Up    Status since last visit: No change    Other associated symptoms:None    Complicating factors:   Significant life event: No   Current substance abuse: None  Depression symptoms: No  MARCELO-7 SCORE 4/12/2016 5/9/2017   Total Score 6 21        GAD7       1 month ago, house on the market.  April 29th, super stress and stopped eating and no sleep and no food.  Them the next day felt like super intense worry, and still had to function and take kids to zoo.  [place] like something broke in brain, and the next dy couldn't sleep or function.  Hasn't been eating or drinking.  On Monday saw Katie and saw Dr. Trammell, and he thought she had anxiety, therapist, and hadn't been taking wellbutrin.      Brought up to high dose really fast, and     Sunday started feeling better.  Saw Dr. Vanna Bates at [facility].  She cytarted cymbalta; felt dead to the world and \"went crazy\" and heart went boom, crazy circulation trouble.      In the middle of this saw endocrinologist - was taking too much synthroid.    Stopped x 3 days, then down to 112.    2 days ago felt like self again        TSH   Date Value Ref Range Status   03/10/2017 1.90 0.40 - 4.00 mU/L Final   12/02/2016 3.30 0.40 - 5.00 mU/L Final   04/12/2016 1.78 0.40 - 4.00 mU/L Final   07/07/2015 2.24 0.40 - 4.00 mU/L Final   12/05/2014 3.52 0.40 - 4.00 mU/L Final     Comment:     Effective 7/30/2014, the reference range for this assay has changed to reflect   new instrumentation/methodology.               Amount of exercise or physical activity: 4-5 days/week for an average of 30-45 minutes    Problems taking medications regularly: No    Medication side effects: none    Diet: gluten-free/reduced      Pt would like to discuss thyroid and circulation    Problem list and histories reviewed & " "adjusted, as indicated.  Additional history: as documented    Reviewed and updated as needed this visit by clinical staff  Tobacco  Allergies  Med Hx  Surg Hx  Fam Hx  Soc Hx      Reviewed and updated as needed this visit by Provider         ROS:  Constitutional, HEENT, cardiovascular, pulmonary, gi and gu systems are negative, except as otherwise noted.    OBJECTIVE:                                                    /60  Pulse 76  Temp 97.5  F (36.4  C) (Tympanic)  Resp 16  Ht 5' 3\" (1.6 m)  Wt 140 lb 8 oz (63.7 kg)  LMP 05/26/2017 (Approximate)  SpO2 98%  BMI 24.89 kg/m2  Body mass index is 24.89 kg/(m^2).  GENERAL: healthy, alert and no distress  NECK: no adenopathy, no asymmetry, masses, or scars and thyroid normal to palpation  RESP: lungs clear to auscultation - no rales, rhonchi or wheezes  CV: regular rate and rhythm, normal S1 S2, no S3 or S4, no murmur, click or rub, no peripheral edema and peripheral pulses strong  ABDOMEN: soft, nontender, no hepatosplenomegaly, no masses and bowel sounds normal  MS: no gross musculoskeletal defects noted, no edema  PSYCH: mentation appears normal, tangential, affect normal/bright, anxious, judgement and insight intact and appearance well groomed       ASSESSMENT/PLAN:                                                        Deysi was seen today for anxiety and thyroid problem.    Diagnoses and all orders for this visit:    Hypothyroidism due to Hashimoto's thyroiditis    MARCELO (generalized anxiety disorder)    Major depressive disorder, recurrent episode, mild (H)    --Glad you stopped your thyroid medication.  --Glad you are feeling better.  --Continue as you are with plan - and see a therapist.  Referred.    Greater than 25 minutes total were spent on this encounter, of which more than 50% was spent in direct communication with the patient including history, exam, counseling and coordination of care.    Payton Horn MD  [facility] " [facility]    I think counseling will be very helpful for you.  I'm so glad you are taking this step.      Here is a list of people I've worked with who I think might be helpful for you and your situation.  However, finding a counselor/therapist that works for you is a little like matchmaking, so don't be afraid to call and vet any recommendations I give you or friends give you.  You want to find someone who feels like a good fit for you.      LMFT:  Mitra Parker  [email or web address]  Bond, [facility]  Individual/Family Therapy/Cross Cultural  Fluent in Cymraes and Vietnamese  Marriage and Teens  Http://[email or web address] Networks/    Angle Moon PhD [facility], [facility]  Couples, families      Kelly Rosalind [facility] Therapy      LGBT Friendly  Meir Ramírez at [facility], in [facility]    Spritual/Yoga/Perfectionist  Dr. Tara Tafoya   [email or web address]  [phone]    Sex Therapy:  [facility]  [email or web address]  Mery Raza MA      [facility] En[place] or Quin Brooks  [facility]  http://[email or web address] [email or web address] Pet [email or web address]    Grief:  Kristel Salazar M.A.,   [email or web address]/Private Practice  [address]  [phone]    Sally Watson [place] [facility]  [phone]    Stacie Posey   [facility]  Address: [address] [place] [address] [address], [place] [address]   [phone]    Emely Ortiz  [phone]    EMDR/Mood/Bipolar/Dual disorders:  Dr. Jerrica [facility], [facility]  Dr. Kristel Salazar    [facility]  [phone]  [email or web address]    [facility]  [email or web address]  [phone]    [facility]otional  Healing  [email or web address]    [facility]  [email or web address]  Nadiyawilmer chavez  [phone]      José  [phone]  [phone]        Addiction therapist:  Dr. Jerrica [facility] - [facility] [phone]  Dr. Jerrica Emerson - [facility] [phone]

## 2017-05-31 NOTE — NURSING NOTE
"Chief Complaint   Patient presents with     Anxiety     Thyroid Problem       Initial /60  Pulse 76  Temp 97.5  F (36.4  C) (Tympanic)  Resp 16  Ht 5' 3\" (1.6 m)  Wt 140 lb 8 oz (63.7 kg)  LMP 05/26/2017 (Approximate)  SpO2 98%  BMI 24.89 kg/m2 Estimated body mass index is 24.89 kg/(m^2) as calculated from the following:    Height as of this encounter: 5' 3\" (1.6 m).    Weight as of this encounter: 140 lb 8 oz (63.7 kg).  Medication Reconciliation: complete   .Car FUENTES      "

## 2017-06-09 ENCOUNTER — OFFICE VISIT (OUTPATIENT)
Dept: FAMILY MEDICINE | Facility: CLINIC | Age: 42
End: 2017-06-09
Payer: COMMERCIAL

## 2017-06-09 VITALS
HEIGHT: 63 IN | RESPIRATION RATE: 16 BRPM | SYSTOLIC BLOOD PRESSURE: 100 MMHG | TEMPERATURE: 98.1 F | BODY MASS INDEX: 24.89 KG/M2 | HEART RATE: 77 BPM | DIASTOLIC BLOOD PRESSURE: 64 MMHG | WEIGHT: 140.5 LBS | OXYGEN SATURATION: 97 %

## 2017-06-09 DIAGNOSIS — E06.3 HYPOTHYROIDISM DUE TO HASHIMOTO'S THYROIDITIS: ICD-10-CM

## 2017-06-09 DIAGNOSIS — F41.1 GAD (GENERALIZED ANXIETY DISORDER): Primary | ICD-10-CM

## 2017-06-09 DIAGNOSIS — F50.811 BINGE-EATING DISORDER, MODERATE: ICD-10-CM

## 2017-06-09 PROCEDURE — 99214 OFFICE O/P EST MOD 30 MIN: CPT | Performed by: FAMILY MEDICINE

## 2017-06-09 NOTE — MR AVS SNAPSHOT
"              After Visit Summary   6/9/2017    Deysi Douglas    MRN: [id number]           Patient Information     Date Of Birth          1975        Visit Information        Provider Department      6/9/2017 1:20 PM Payton Horn MD [facility]        Today's Diagnoses     MARCELO (generalized anxiety disorder)    -  1    Hypothyroidism due to Hashimoto's thyroiditis        Binge-eating disorder, moderate           Follow-ups after your visit        Who to contact     If you have questions or need follow up information about today's clinic visit or your schedule please contact [facility] directly at [phone].  Normal or non-critical lab and imaging results will be communicated to you by AlephDhart, letter or phone within 4 business days after the clinic has received the results. If you do not hear from us within 7 days, please contact the clinic through AlephDhart or phone. If you have a critical or abnormal lab result, we will notify you by phone as soon as possible.  Submit refill requests through AquaGenesis or call your pharmacy and they will forward the refill request to us. Please allow 3 business days for your refill to be completed.          Additional Information About Your Visit        MyChart Information     AquaGenesis gives you secure access to your electronic health record. If you see a primary care provider, you can also send messages to your care team and make appointments. If you have questions, please call your primary care clinic.  If you do not have a primary care provider, please call [phone] and they will assist you.        Care EveryWhere ID     This is your Care EveryWhere ID. This could be used by other organizations to access your [facility] medical records  [id number]        Your Vitals Were     Pulse Temperature Respirations Height Last Period Pulse Oximetry    77 98.1  F (36.7  C) (Tympanic) 16 5' 3\" (1.6 m) " 05/26/2017 (Approximate) 97%    BMI (Body Mass Index)                   24.89 kg/m2            Blood Pressure from Last 3 Encounters:   06/09/17 100/64   05/31/17 118/60   05/09/17 128/80    Weight from Last 3 Encounters:   06/09/17 140 lb 8 oz (63.7 kg)   05/31/17 140 lb 8 oz (63.7 kg)   05/09/17 141 lb 12.8 oz (64.3 kg)              Today, you had the following     No orders found for display         Today's Medication Changes          These changes are accurate as of: 6/9/17 11:59 PM.  If you have any questions, ask your nurse or doctor.               These medicines have changed or have updated prescriptions.        Dose/Directions    BuPROPion HCl ER (XL) 450 MG Tb24   This may have changed:  Another medication with the same name was removed. Continue taking this medication, and follow the directions you see here.   Changed by:  Payton Horn MD        Dose:  150 mg   Take 150 mg by mouth daily   Refills:  0       levothyroxine 112 MCG tablet   Commonly known as:  SYNTHROID/LEVOTHROID   This may have changed:    - medication strength  - how much to take   Used for:  Hypothyroidism due to Hashimoto's thyroiditis   Changed by:  Payton Horn MD        Dose:  112 mcg   Take 1 tablet (112 mcg) by mouth daily   Refills:  0       LORazepam 1 MG tablet   Commonly known as:  ATIVAN   This may have changed:  how much to take   Used for:  Depression with anxiety        Dose:  1 mg   Take 1 tablet (1 mg) by mouth every 8 hours as needed for anxiety   Quantity:  20 tablet   Refills:  0         Stop taking these medicines if you haven't already. Please contact your care team if you have questions.     ALPRAZolam 0.5 MG tablet   Commonly known as:  XANAX   Stopped by:  Payton Horn MD                    Primary Care Provider Office Phone # Fax #    Payton Horn [phone]       [address] [address]        Thank you!     Thank you for  choosing [facility]  for your care. Our goal is always to provide you with excellent care. Hearing back from our patients is one way we can continue to improve our services. Please take a few minutes to complete the written survey that you may receive in the mail after your visit with us. Thank you!             Your Updated Medication List - Protect others around you: Learn how to safely use, store and throw away your medicines at www.disposemymeds.org.          This list is accurate as of: 6/9/17 11:59 PM.  Always use your most recent med list.                   Brand Name Dispense Instructions for use    BuPROPion HCl ER (XL) 450 MG Tb24      Take 150 mg by mouth daily       calcium + D 600-200 MG-UNIT Tabs   Generic drug:  calcium carbonate-vitamin D      2 TABLETS DAILY       fluticasone 50 MCG/ACT spray    FLONASE    16 g    Spray 1-2 sprays into both nostrils daily       levothyroxine 112 MCG tablet    SYNTHROID/LEVOTHROID     Take 1 tablet (112 mcg) by mouth daily       LORazepam 1 MG tablet    ATIVAN    20 tablet    Take 1 tablet (1 mg) by mouth every 8 hours as needed for anxiety       ZYRTEC 10 MG tablet   Generic drug:  cetirizine      1 TABLET DAILY

## 2017-06-09 NOTE — NURSING NOTE
"Chief Complaint   Patient presents with     Recheck Medication       Initial /64  Pulse 77  Temp 98.1  F (36.7  C) (Tympanic)  Resp 16  Ht 5' 3\" (1.6 m)  Wt 140 lb 8 oz (63.7 kg)  LMP 05/26/2017 (Approximate)  SpO2 97%  BMI 24.89 kg/m2 Estimated body mass index is 24.89 kg/(m^2) as calculated from the following:    Height as of this encounter: 5' 3\" (1.6 m).    Weight as of this encounter: 140 lb 8 oz (63.7 kg).  Medication Reconciliation: complete   .Car FUENTES      "

## 2017-06-09 NOTE — PROGRESS NOTES
"  SUBJECTIVE:                                                    Deysi Douglas is a 42 year old female who presents to clinic today for the following health issues:      Medication Followup of levothyroxine    Taking Medication as prescribed: yes    Side Effects:  None    Medication Helping Symptoms:  yes     42 year old with history of anxiety and hypothyroidism recently overcorrected, here today to follow up on multiple concerns and questions about managing care.    Feeling better overall.  Still seeing endocrine for thyroid, and psychiatrist for anxiety.    Wonders if she should?  Still okay to check in here with PCP?    Problem list and histories reviewed & adjusted, as indicated.  Additional history: as documented    Reviewed and updated as needed this visit by clinical staff  Tobacco  Allergies  Med Hx  Surg Hx  Fam Hx  Soc Hx      Reviewed and updated as needed this visit by Provider         ROS:  Constitutional, HEENT, cardiovascular, pulmonary, gi and gu systems are negative, except as otherwise noted.    OBJECTIVE:                                                    /64  Pulse 77  Temp 98.1  F (36.7  C) (Tympanic)  Resp 16  Ht 5' 3\" (1.6 m)  Wt 140 lb 8 oz (63.7 kg)  LMP 05/26/2017 (Approximate)  SpO2 97%  BMI 24.89 kg/m2  Body mass index is 24.89 kg/(m^2).  GENERAL: healthy, alert and no distress  MENTAL STATUS EXAM:   Deysi is casually dressed and well groomed, sitting comfortably during encounter. Alert, awake, and in no acute distress. Eye contact is good. Speech is rapid, not tangential but quick train of thought, not pressured. Psychomotor activity is within normal limits and there no abnormal involuntary movements demonstrated. Mood is \"good, better, but still anxious\" and affect is anxious with good range and reactivity. [place] is generally positive and hopeful despite recent stressors. Thought process is linear, logical, and goal directed. Thought content is free of " "suicidal or homicidal ideation, hallucinations or other symptoms of psychosis. Insight and judgement are fair to good. Generally oriented. No difficulty with memory, attention, or cognition. Associations intact, gait and station within normal limits.     ASSESSMENT/PLAN:                                                        Deysi was seen today for recheck medication.    Diagnoses and all orders for this visit:    MARCELO (generalized anxiety disorder)    Hypothyroidism due to Hashimoto's thyroiditis    Binge-eating disorder, moderate      PLAN:  --Discussed possible taper off ativan; will discuss with psychiatrist next week.  --Discussed that she needs to advocate for self, job isn't to be psychiatrist's \"best patient\"  --Discussed retest TSH 6/27 at endo - good idea.  --Okay to remain of cymbalta and wellbutrin for now.  --Okay to follow up here for baseline checks, check in with PCP as she needs.  --DON\"T LOOK for more help; instead use the help you have (therapists at [facility], endo at [facility], psychiatrist, us).    Follow up here as needed or for complete physical exam.    Greater than 25 minutes total were spent on this encounter, of which more than 50% was spent in direct communication with the patient including history, exam, counseling and coordination of care.  Payton Horn MD  [facility]      "

## 2017-06-20 RX ORDER — LEVOTHYROXINE SODIUM 112 UG/1
100 TABLET ORAL DAILY
COMMUNITY
Start: 2017-06-20 | End: 2017-09-29

## 2017-09-29 ENCOUNTER — OFFICE VISIT (OUTPATIENT)
Dept: FAMILY MEDICINE | Facility: CLINIC | Age: 42
End: 2017-09-29
Payer: COMMERCIAL

## 2017-09-29 VITALS
BODY MASS INDEX: 25.33 KG/M2 | WEIGHT: 143 LBS | OXYGEN SATURATION: 97 % | HEART RATE: 69 BPM | RESPIRATION RATE: 16 BRPM | DIASTOLIC BLOOD PRESSURE: 64 MMHG | TEMPERATURE: 97.7 F | SYSTOLIC BLOOD PRESSURE: 102 MMHG

## 2017-09-29 DIAGNOSIS — B00.9 HERPES SIMPLEX VIRUS INFECTION: Primary | ICD-10-CM

## 2017-09-29 DIAGNOSIS — E06.3 HYPOTHYROIDISM DUE TO HASHIMOTO'S THYROIDITIS: ICD-10-CM

## 2017-09-29 DIAGNOSIS — M19.041 DEGENERATIVE ARTHRITIS OF FINGER, RIGHT: ICD-10-CM

## 2017-09-29 PROCEDURE — 99214 OFFICE O/P EST MOD 30 MIN: CPT | Performed by: FAMILY MEDICINE

## 2017-09-29 RX ORDER — VALACYCLOVIR HYDROCHLORIDE 1 G/1
2000 TABLET, FILM COATED ORAL 2 TIMES DAILY
Qty: 4 TABLET | Refills: 11 | Status: SHIPPED | OUTPATIENT
Start: 2017-09-29 | End: 2024-03-05 | Stop reason: DRUGHIGH

## 2017-09-29 RX ORDER — LEVOTHYROXINE SODIUM 100 UG/1
100 TABLET ORAL DAILY
COMMUNITY
Start: 2017-09-29 | End: 2018-05-08

## 2017-09-29 NOTE — NURSING NOTE
"Chief Complaint   Patient presents with     Thyroid Problem     check     Hand Pain     right hand       Initial /64  Pulse 69  Temp 97.7  F (36.5  C) (Tympanic)  Resp 16  Wt 143 lb (64.9 kg)  LMP 09/27/2017 (Approximate)  SpO2 97%  BMI 25.33 kg/m2 Estimated body mass index is 25.33 kg/(m^2) as calculated from the following:    Height as of 6/9/17: 5' 3\" (1.6 m).    Weight as of this encounter: 143 lb (64.9 kg).  Medication Reconciliation: complete   .Car FUENTES      "

## 2017-09-29 NOTE — MR AVS SNAPSHOT
After Visit Summary   9/29/2017    Deysi Douglas    MRN: [id number]           Patient Information     Date Of Birth          1975        Visit Information        Provider Department      9/29/2017 10:20 AM Payton Horn MD [facility]        Today's Diagnoses     Herpes simplex virus infection    -  1    Degenerative arthritis of finger, right        Hypothyroidism due to Hashimoto's thyroiditis           Follow-ups after your visit        Additional Services     [facility] PT, HAND, AND CHIROPRACTIC REFERRAL       **This order will print in the [facility] Scheduling Office**    Physical Therapy, Hand Therapy and Chiropractic Care are available through:    *[facility]  *[facility]  *[facility]    Call one number to schedule at any of the above locations: [phone].    Your provider has referred you to: Hand Therapy    Indication/Reason for Referral: Hand Pain  Onset of Illness: months  Therapy Orders: Evaluate and Treat  Special Programs:   Special Request:     [facility]      Additional Comments for the Therapist or Chiropractor:     Please be aware that coverage of these services is subject to the terms and limitations of your health insurance plan.  Call member services at your health plan with any benefit or coverage questions.      Please bring the following to your appointment:    *Your personal calendar for scheduling future appointments  *Comfortable clothing                  Who to contact     If you have questions or need follow up information about today's clinic visit or your schedule please contact [facility] directly at [phone].  Normal or non-critical lab and imaging results will be communicated to you by MyChart, letter or phone within 4 business days after the clinic has received the results. If you do not hear from us within 7 days, please contact  the clinic through Aristotl or phone. If you have a critical or abnormal lab result, we will notify you by phone as soon as possible.  Submit refill requests through Aristotl or call your pharmacy and they will forward the refill request to us. Please allow 3 business days for your refill to be completed.          Additional Information About Your Visit        [employer] Information     Aristotl gives you secure access to your electronic health record. If you see a primary care provider, you can also send messages to your care team and make appointments. If you have questions, please call your primary care clinic.  If you do not have a primary care provider, please call [phone] and they will assist you.        Care EveryWhere ID     This is your Care EveryWhere ID. This could be used by other organizations to access your [facility] medical records  [id number]        Your Vitals Were     Pulse Temperature Respirations Last Period Pulse Oximetry BMI (Body Mass Index)    69 97.7  F (36.5  C) (Tympanic) 16 09/27/2017 (Approximate) 97% 25.33 kg/m2       Blood Pressure from Last 3 Encounters:   09/29/17 102/64   06/09/17 100/64   05/31/17 118/60    Weight from Last 3 Encounters:   09/29/17 143 lb (64.9 kg)   06/09/17 140 lb 8 oz (63.7 kg)   05/31/17 140 lb 8 oz (63.7 kg)              We Performed the Following     [facility] PT, HAND, AND CHIROPRACTIC REFERRAL          Today's Medication Changes          These changes are accurate as of: 9/29/17 12:48 PM.  If you have any questions, ask your nurse or doctor.               Start taking these medicines.        Dose/Directions    valACYclovir 1000 mg tablet   Commonly known as:  VALTREX   Used for:  Herpes simplex virus infection   Started by:  Payton Horn MD        Dose:  2000 mg   Take 2 tablets (2,000 mg) by mouth 2 times daily   Quantity:  4 tablet   Refills:  11         These medicines have changed or have updated prescriptions.        Dose/Directions    levothyroxine  100 MCG tablet   Commonly known as:  SYNTHROID/LEVOTHROID   This may have changed:  medication strength   Used for:  Hypothyroidism due to Hashimoto's thyroiditis   Changed by:  Payton Horn MD        Dose:  100 mcg   Take 1 tablet (100 mcg) by mouth daily   Refills:  0         Stop taking these medicines if you haven't already. Please contact your care team if you have questions.     BuPROPion HCl ER (XL) 450 MG Tb24   Stopped by:  Payton Horn MD           fluticasone 50 MCG/ACT spray   Commonly known as:  FLONASE   Stopped by:  Payton Horn MD           LORazepam 1 MG tablet   Commonly known as:  ATIVAN   Stopped by:  Payton Horn MD                Where to get your medicines      These medications [facility] [address] [place] [address]    Hours:  24-hours Phone:  [phone]     valACYclovir 1000 mg tablet                Primary Care Provider Office Phone # Fax #    Payton Honr [phone]       [facility] [address]        Equal Access to Services     [facility] : Hadii geovanna desai hadasho Sotato, waaxda luqadaha, qaybta kaalmada blaise, meka blackwood . So [facility] [phone].    ATENCIÓN: Si habla español, tiene a medina disposición servicios gratdimitrisos de asistencia lingüística. Alexey al [phone].    We comply with applicable federal civil rights laws and [place] laws. We do not discriminate on the basis of race, color, national origin, age, disability, sex, sexual orientation, or gender identity.            Thank you!     Thank you for choosing [facility]  for your care. Our goal is always to provide you with excellent care. Hearing back from our patients is one way we can continue to improve our services. Please take a few minutes to complete the written survey that you may receive in  the mail after your visit with us. Thank you!             Your Updated Medication List - Protect others around you: Learn how to safely use, store and throw away your medicines at www.disposemymeds.org.          This list is accurate as of: 9/29/17 12:48 PM.  Always use your most recent med list.                   Brand Name Dispense Instructions for use Diagnosis    calcium + D 600-200 MG-UNIT Tabs   Generic drug:  calcium carbonate-vitamin D      2 TABLETS DAILY        levothyroxine 100 MCG tablet    SYNTHROID/LEVOTHROID     Take 1 tablet (100 mcg) by mouth daily    Hypothyroidism due to Hashimoto's thyroiditis       valACYclovir 1000 mg tablet    VALTREX    4 tablet    Take 2 tablets (2,000 mg) by mouth 2 times daily    Herpes simplex virus infection       ZYRTEC 10 MG tablet   Generic drug:  cetirizine      1 TABLET DAILY

## 2017-09-29 NOTE — PROGRESS NOTES
SUBJECTIVE:   Deysi Douglas is a 42 year old female who presents to clinic today for the following health issues:      Musculoskeletal problem/pain      Duration: feb 2017    Description  Location: right middle finger    Intensity:  moderate    Accompanying signs and symptoms: aching and stiffness    History  Previous similar problem: no   Previous evaluation:  none    Precipitating or alleviating factors:  Trauma or overuse: YES  Aggravating factors include: over use    Therapies tried and outcome: nothing    42 year old well known to me.    Update, on 100 mcg levothyroxine and going well.   Has been following with Dr. Lefavre every 6 weeks.   Wonders about checking     Not on any antidepressants or anxiety medications.  Feels so much better  More stable    Also Dr. Slaughter class on 10/13    Has HSV, hardly gets breakouts.    Taking lysine.    Pt would like to have thyroid magui checked.      Problem list and histories reviewed & adjusted, as indicated.  Additional history: as documented    Reviewed and updated as needed this visit by clinical staffTobacco  Allergies  Meds  Med Hx  Surg Hx  Fam Hx  Soc Hx      Reviewed and updated as needed this visit by Provider  Meds         ROS:  Constitutional, HEENT, cardiovascular, pulmonary, gi and gu systems are negative, except as otherwise noted.      OBJECTIVE:   /64  Pulse 69  Temp 97.7  F (36.5  C) (Tympanic)  Resp 16  Wt 143 lb (64.9 kg)  LMP 09/27/2017 (Approximate)  SpO2 97%  BMI 25.33 kg/m2  Body mass index is 25.33 kg/(m^2).  GENERAL: healthy, alert and no distress  MS: no gross musculoskeletal defects noted, no edema  SKIN: no suspicious lesions or rashes  NEURO: Normal strength and tone, mentation intact and speech normal  PSYCH: mentation appears normal, affect normal/bright    ASSESSMENT/PLAN:     Deysi was seen today for thyroid problem and hand pain.    Diagnoses and all orders for this visit:    Herpes simplex virus infection  -      valACYclovir (VALTREX) 1000 mg tablet; Take 2 tablets (2,000 mg) by mouth 2 times daily    Degenerative arthritis of finger, right  -     [facility] PT, HAND, AND CHIROPRACTIC REFERRAL    Hypothyroidism due to Hashimoto's thyroiditis        PLAN:  --Discussed thyroid magui testing at length; we discussed that she doesn't need labs to monitor magui; she can tell!  Pay attention to body.  Not needed.  --Answered questions about flu shots, daughter.  --Valtrex for HSV.    Greater than 25 minutes total were spent on this encounter, of which more than 50% was spent in direct communication with the patient including history, exam, counseling and coordination of care.     Payton Horn MD  [facility]

## 2017-10-25 ENCOUNTER — OFFICE VISIT (OUTPATIENT)
Dept: FAMILY MEDICINE | Facility: CLINIC | Age: 42
End: 2017-10-25
Payer: COMMERCIAL

## 2017-10-25 VITALS
RESPIRATION RATE: 16 BRPM | BODY MASS INDEX: 25.33 KG/M2 | TEMPERATURE: 98.7 F | OXYGEN SATURATION: 98 % | WEIGHT: 143 LBS | HEART RATE: 70 BPM | SYSTOLIC BLOOD PRESSURE: 100 MMHG | DIASTOLIC BLOOD PRESSURE: 62 MMHG

## 2017-10-25 DIAGNOSIS — Z23 NEED FOR PROPHYLACTIC VACCINATION AND INOCULATION AGAINST INFLUENZA: ICD-10-CM

## 2017-10-25 DIAGNOSIS — E06.3 HYPOTHYROIDISM DUE TO HASHIMOTO'S THYROIDITIS: Primary | ICD-10-CM

## 2017-10-25 DIAGNOSIS — J39.2 THROAT IRRITATION: ICD-10-CM

## 2017-10-25 PROCEDURE — 90686 IIV4 VACC NO PRSV 0.5 ML IM: CPT | Performed by: FAMILY MEDICINE

## 2017-10-25 PROCEDURE — 90471 IMMUNIZATION ADMIN: CPT | Performed by: FAMILY MEDICINE

## 2017-10-25 PROCEDURE — 36415 COLL VENOUS BLD VENIPUNCTURE: CPT | Performed by: FAMILY MEDICINE

## 2017-10-25 PROCEDURE — 84443 ASSAY THYROID STIM HORMONE: CPT | Performed by: FAMILY MEDICINE

## 2017-10-25 PROCEDURE — 86141 C-REACTIVE PROTEIN HS: CPT | Performed by: FAMILY MEDICINE

## 2017-10-25 PROCEDURE — 99214 OFFICE O/P EST MOD 30 MIN: CPT | Mod: 25 | Performed by: FAMILY MEDICINE

## 2017-10-25 ASSESSMENT — PATIENT HEALTH QUESTIONNAIRE - PHQ9
10. IF YOU CHECKED OFF ANY PROBLEMS, HOW DIFFICULT HAVE THESE PROBLEMS MADE IT FOR YOU TO DO YOUR WORK, TAKE CARE OF THINGS AT HOME, OR GET ALONG WITH OTHER PEOPLE: NOT DIFFICULT AT ALL
SUM OF ALL RESPONSES TO PHQ QUESTIONS 1-9: 2
SUM OF ALL RESPONSES TO PHQ QUESTIONS 1-9: 2

## 2017-10-25 NOTE — PROGRESS NOTES
"  SUBJECTIVE:   Deysi Douglas is a 42 year old female who presents to clinic today for the following health issues:      Sore throat      Duration: 1 month    Description (location/character/radiation): throat    Intensity:  moderate    Accompanying signs and symptoms: might be allergies    History (similar episodes/previous evaluation): None    Precipitating or alleviating factors: None    Therapies tried and outcome: None     Friend just found out had esophageal cancer.  Irritation in throat x few months.  In past has had higher sore throat.  Lower.  No pain when eating.  Soothing when eats.  No heartburn. Did have it pregnancy.  No history of upper endoscpy.  Worried it \"could be cancer\".  Been for several months, not going away.  Does have past medical history of binge eating disorder.    Also would like CRP-cardiac testing from class she is taking with Dr. Slaughter, psychiatrist.    Problem list and histories reviewed & adjusted, as indicated.  Additional history: as documented    Patient Active Problem List   Diagnosis     Septate Uterus     CARDIOVASCULAR SCREENING; LDL GOAL LESS THAN 160     Mild major depression (H)     MARCELO (generalized anxiety disorder)     Hypothyroidism due to Hashimoto's thyroiditis     Binge-eating disorder, moderate     Alcohol use disorder, mild, in sustained remission     Past Surgical History:   Procedure Laterality Date     C  DELIVERY ONLY       C/SECTION, LOW TRANSVERSE  2011    repeat     D & C  ,     missed Ab       Social History   Substance Use Topics     Smoking status: Never Smoker     Smokeless tobacco: Never Used     Alcohol use No     Family History   Problem Relation Age of Onset     Thyroid Disease Mother      OSTEOPOROSIS Mother      Depression Father      C.A.D. Father      MI     Lipids Father      Substance Abuse Father      OSTEOPOROSIS Maternal Grandmother      Depression Brother      Substance Abuse Brother          Reviewed and updated " as needed this visit by clinical staffTobacco  Allergies  Meds  Med Hx  Surg Hx  Fam Hx  Soc Hx      Reviewed and updated as needed this visit by Provider  Meds         ROS:  Constitutional, HEENT, cardiovascular, pulmonary, gi and gu systems are negative, except as otherwise noted.      OBJECTIVE:   /62  Pulse 70  Temp 98.7  F (37.1  C) (Tympanic)  Resp 16  Wt 143 lb (64.9 kg)  LMP 10/20/2017 (Approximate)  SpO2 98%  BMI 25.33 kg/m2  Body mass index is 25.33 kg/(m^2).  GENERAL: healthy, alert and no distress  EYES: Eyes grossly normal to inspection, PERRL and conjunctivae and sclerae normal  HENT: ear canals and TM's normal, nose and mouth without ulcers or lesions  NECK: no adenopathy, no asymmetry, masses, or scars and thyroid normal to palpation  RESP: lungs clear to auscultation - no rales, rhonchi or wheezes  SKIN: no suspicious lesions or rashes  NEURO: Normal strength and tone, mentation intact and speech normal  PSYCH: affect normal/bright, anxious, judgement and insight intact and appearance well groomed    Diagnostic Test Results:  No results found for this or any previous visit (from the past 24 hour(s)).  Unresulted Labs Ordered in the Past 30 Days of this Admission     Date and Time Order Name Status Description    10/25/2017 1149 CRP CARDIAC RISK In process     10/25/2017 1149 TSH WITH FREE T4 REFLEX In process             ASSESSMENT/PLAN:     Deysi was seen today for pharyngitis.    Diagnoses and all orders for this visit:    Hypothyroidism due to Hashimoto's thyroiditis  Comments:  Check TSH today.  Seeing endo next week.  Ask them to recheck your thyroid exam  Orders:  -     TSH with free T4 reflex  -     CRP cardiac risk    Throat irritation  Comments:  At risk for reflux esophagitis considering gerd in pregnancy and pmh of binge eating disorder (in full remission).  As not going away; will get upper endoscopy  Orders:  -     GASTROENTEROLOGY ADULT REF PROCEDURE ONLY    Need  for prophylactic vaccination and inoculation against influenza  -     HC FLU VAC PRESRV FREE QUAD SPLIT VIR 3+YRS IM      See plan below:    Patient Instructions   1) I think you should get an upper endoscopy- call to schedule:   [phone] ([facility] and [place])    2) We'll check your thyroid and your CRP-cardiac today.    3) Ask your thyroid doctor if she feels any nodules.                Payton Horn MD  [facility]    Answers for HPI/ROS submitted by the patient on 10/25/2017   If you checked off any problems, how difficult have these problems made it for you to do your work, take care of things at home, or get along with other people?: Not difficult at all  PHQ9 TOTAL SCORE: 2

## 2017-10-25 NOTE — MR AVS SNAPSHOT
After Visit Summary   10/25/2017    Deysi Douglas    MRN: [id number]           Patient Information     Date Of Birth          1975        Visit Information        Provider Department      10/25/2017 11:20 AM Payton Horn MD [facility]        Today's Diagnoses     Hypothyroidism due to Hashimoto's thyroiditis    -  1    Throat irritation        Need for prophylactic vaccination and inoculation against influenza          Care Instructions    1) I think you should get an upper endoscopy- call to schedule:   [phone] ([facility] and [place])    2) We'll check your thyroid and your CRP-cardiac today.    3) Ask your thyroid doctor if she feels any nodules.                    Follow-ups after your visit        Additional Services     GASTROENTEROLOGY ADULT REF PROCEDURE ONLY       Last Lab Result: Creatinine (mg/dL)       Date                     Value                 04/12/2016               0.69             ----------  Body mass index is 25.33 kg/(m^2).     Needed:  No  Language:  English    Patient will be contacted to schedule procedure.     Please be aware that coverage of these services is subject to the terms and limitations of your health insurance plan.  Call member services at your health plan with any benefit or coverage questions.  Any procedures must be performed at a [facility] facility OR coordinated by your clinic's referral office.    Please bring the following with you to your appointment:    (1) Any X-Rays, CTs or MRIs which have been performed.  Contact the facility where they were done to arrange for  prior to your scheduled appointment.    (2) List of current medications   (3) This referral request   (4) Any documents/labs given to you for this referral                  Your next 10 appointments already scheduled     Nov 01, 2017  2:30 PM CDT   (Arrive by 2:15 PM)   [facility] ([place] [facility]    [address]  [place] [address]   [phone]            Nov 08, 2017  2:30 PM CST   [facility] Hand with Faiza Childress OT   [facility]    [address]  [place] [address]   [phone]            Nov 15, 2017  2:30 PM CST   [facility] Hand with Faiza Childress, OT   [facility] [facility] [facility]    [address]  [place] [address]   [phone]              Who to contact     If you have questions or need follow up information about today's clinic visit or your schedule please contact [facility] directly at [phone].  Normal or non-critical lab and imaging results will be communicated to you by [employer], letter or phone within 4 business days after the clinic has received the results. If you do not hear from us within 7 days, please contact the clinic through [employer] or phone. If you have a critical or abnormal lab result, we will notify you by phone as soon as possible.  Submit refill requests through [email or web address] or call your pharmacy and they will forward the refill request to us. Please allow 3 business days for your refill to be completed.          Additional Information About Your Visit        [employer] Information     [email or web address] gives you secure access to your electronic health record. If you see a primary care provider, you can also send messages to your care team and make appointments. If you have questions, please call your primary care clinic.  If you do not have a primary care provider, please call [phone] and they will assist you.        Care EveryWhere ID     This is your Care EveryWhere ID. This could be used by other organizations to access your [facility] medical records  [id number]        Your Vitals Were     Pulse Temperature Respirations Last Period Pulse Oximetry BMI (Body Mass Index)    70 98.7  F (37.1  C) (Tympanic) 16  10/20/2017 (Approximate) 98% 25.33 kg/m2       Blood Pressure from Last 3 Encounters:   10/25/17 100/62   09/29/17 102/64   06/09/17 100/64    Weight from Last 3 Encounters:   10/25/17 143 lb (64.9 kg)   09/29/17 143 lb (64.9 kg)   06/09/17 140 lb 8 oz (63.7 kg)              We Performed the Following     CRP cardiac risk     GASTROENTEROLOGY ADULT REF PROCEDURE ONLY     HC FLU VAC PRESRV FREE QUAD SPLIT VIR 3+YRS IM     TSH with free T4 reflex        Primary Care Provider Office Phone # Fax #    Payton Alba Horn [phone] [address] [address]        Equal Access to Services     JUNITO STEWART : Hadii geovanna Jones, wakassida sabine, qaybta kaalmada blaise, meka schaefer. So [facility] [phone].    ATENCIÓN: Si habla español, tiene a medina disposición servicios gratuitos de asistencia lingüística. Llame al [phone].    We comply with applicable federal civil rights laws and [place] laws. We do not discriminate on the basis of race, color, national origin, age, disability, sex, sexual orientation, or gender identity.            Thank you!     Thank you for choosing [facility]  for your care. Our goal is always to provide you with excellent care. Hearing back from our patients is one way we can continue to improve our services. Please take a few minutes to complete the written survey that you may receive in the mail after your visit with us. Thank you!             Your Updated Medication List - Protect others around you: Learn how to safely use, store and throw away your medicines at www.disposemymeds.org.          This list is accurate as of: 10/25/17  1:00 PM.  Always use your most recent med list.                   Brand Name Dispense Instructions for use Diagnosis    calcium + D 600-200 MG-UNIT Tabs   Generic drug:  calcium carbonate-vitamin D      2 TABLETS DAILY        levothyroxine 100 MCG tablet     SYNTHROID/LEVOTHROID     Take 1 tablet (100 mcg) by mouth daily    Hypothyroidism due to Hashimoto's thyroiditis       valACYclovir 1000 mg tablet    VALTREX    4 tablet    Take 2 tablets (2,000 mg) by mouth 2 times daily    Herpes simplex virus infection       ZYRTEC 10 MG tablet   Generic drug:  cetirizine      1 TABLET DAILY

## 2017-10-25 NOTE — NURSING NOTE
"Chief Complaint   Patient presents with     Pharyngitis     1 month       Initial /62  Pulse 70  Temp 98.7  F (37.1  C) (Tympanic)  Resp 16  Wt 143 lb (64.9 kg)  LMP 10/20/2017 (Approximate)  SpO2 98%  BMI 25.33 kg/m2 Estimated body mass index is 25.33 kg/(m^2) as calculated from the following:    Height as of 6/9/17: 5' 3\" (1.6 m).    Weight as of this encounter: 143 lb (64.9 kg).  Medication Reconciliation: complete   .Car FUENTES      "

## 2017-10-25 NOTE — PATIENT INSTRUCTIONS
1) I think you should get an upper endoscopy- call to schedule:   [phone] ([facility] and [place])    2) We'll check your thyroid and your CRP-cardiac today.    3) Ask your thyroid doctor if she feels any nodules.

## 2017-10-26 LAB
CRP SERPL HS-MCNC: <0.2 MG/L
TSH SERPL DL<=0.005 MIU/L-ACNC: 3.16 MU/L (ref 0.4–4)

## 2017-10-27 NOTE — PROGRESS NOTES
Good news, Deysi!   Your results are normal.  Let me know if you have any questions.  Dr. Payton Horn MD  [facility][1]  [phone]

## 2017-11-01 ENCOUNTER — THERAPY VISIT (OUTPATIENT)
Dept: OCCUPATIONAL THERAPY | Facility: CLINIC | Age: 42
End: 2017-11-01
Payer: COMMERCIAL

## 2017-11-01 DIAGNOSIS — M79.641 PAIN OF RIGHT HAND: Primary | ICD-10-CM

## 2017-11-01 PROCEDURE — 97165 OT EVAL LOW COMPLEX 30 MIN: CPT | Mod: GO | Performed by: OCCUPATIONAL THERAPIST

## 2017-11-01 PROCEDURE — 97110 THERAPEUTIC EXERCISES: CPT | Mod: GO | Performed by: OCCUPATIONAL THERAPIST

## 2017-11-01 PROCEDURE — 29130 APPL FINGER SPLINT STATIC: CPT | Mod: GO | Performed by: OCCUPATIONAL THERAPIST

## 2017-11-01 NOTE — PROGRESS NOTES
Hand Therapy Initial Evaluation    Current Date:  11/1/2017    Diagnosis: R long and ring finger pain  DOI: 09/29/17 (MD order, has been going on for more than a year)  Procedure:  none    Precautions: NA    Subjective:  Deysi Douglas is a 42 year old right hand dominant female.    Patient reports symptoms of pain, stiffness/loss of motion, weakness/loss of strength and edema of the right long and ring fingers which occurred due to not sure, maybe repetitive use. Since onset symptoms are Gradually getting better.  Special tests:  x-ray.  Previous treatment: none.    General health as reported by patient is good.  Pertinent medical history includes:thyroid problems, numbness/tingling  Medical allergies:none.  Surgical history: none.  Medication history: thyroid, anti-inflammatory.    Occupational Profile Information:  Current occupation is homemaker  Currently working in normal job without restrictions  Job Tasks: NA  Prior functional level:  no limitations  Barriers include:none  Mobility: No difficulty  Transportation: drives  Leisure activities/hobbies: piano, tennis    Functional Outcome Measure:   Upper Extremity Functional Index Score:  SCORE:   Column Totals: /80: 65   (A lower score indicates greater disability.)    Objective:  Pain Level Report  VAS(0-10) 11/1/2017   At Rest: 0/10   At worst: 5/10     Report of Pain:  Location:  long finger and ring finger; PIP joints  Pain Quality:  Aching  Frequency: intermittent    Pain is worst:  daytime  Exacerbated by:  Movement, use  Relieved by:  otc medications and rest  Progression:  Staying the same  Finger Edema  Circumference:  (Measured in cm)  Long Finger  11/1/2017   Location: left right   P1 5.3 5.3   PIP 5.2 5.3   P2 4.6 4.8   DIP 4.4 4.6     Finger Edema  Circumference:  (Measured in cm)  Ring Finger  11/1/2017   Location: left right   P1 5.0 5.0   PIP 4.9 5.1   P2 4.3 4.5   DIP 4.0 4.1     Sensation: WNL throughout all nerve distributions; per patient  report    ROM  Pain Report:  - none    + mild    ++ moderate    +++ severe   HAND 11/1/2017   AROM(PROM) right   Long MP 0/92   PIP 0/110   DIP 0/82   [place] 284   Ring MP 0/91   PIP 0/104   DIP 0/67   [place] 262     Tenderness 11/1/2017   Dorsal PIP joint of Long +   Dorsal DIP of Long +   Dorsal PIP of Ring -   Dorsal DIP of Ring -     Strength:   (Measured in pounds)  Pain Report:  - none    + mild    ++ moderate    +++ severe     11/1/2017   Trials left right   1  2  3 64  63  64 71++  67+  69+   Average: 64 69     Lat Pinch  11/1/2017   Trials left right   1  2  3 18  19  18 19  18  18   Average: 18 18     3 Pt Pinch  11/1/2017   Trials left right   1  2  3 23  22  23 23  21  20   Average: 23 21     Assessment:  Patient presents with symptoms consistent with diagnosis of pain of right long and ring fingers,  with conservative intervention.     Patient's limitations or Problem List includes:  Pain, Increased edema and Weakness of the right long finger and ring finger which interferes with the patient's ability to perform Self Care Tasks (dressing, eating, bathing, hygiene/toileting), Work Tasks, Sleep Patterns, Recreational Activities, Household Chores and Driving  as compared to previous level of function.    Rehab Potential:  Good - Return to full activity, some limitations    Patient will benefit from skilled Occupational Therapy to increase ROM and overall strength and decrease pain to return to previous activity level and resume normal daily tasks and to reach their rehab potential.    Barriers to Learning:  No barrier    Communication Issues:  Patient appears to be able to clearly communicate and understand verbal and written communication and follow directions correctly.    Chart Review: Brief history including review of medical and/or therapy records relating to the presenting problem    Identified Performance Deficits: bathing/showering, toileting, dressing, feeding, hygiene and grooming, driving and  community mobility, health management and maintenance, home establishment and management, meal preparation and cleanup and leisure activities    Assessment of Occupational Performance:  5 or more Performance Deficits    Clinical Decision Making (Complexity): Low complexity    Treatment Explanation:  The following has been discussed with the patient:  RX ordered/plan of care  Anticipated outcomes  Possible risks and side effects    Plan:  Frequency:  1 X week, once daily  Duration:  for 4 weeks    Treatment Plan:   Modalities:  US, Fluidotherapy, Paraffin and Laser Light  Therapeutic Exercise:  AROM, AAROM, PROM, Tendon Gliding, Blocking, Isotonics, Isometrics and Stabilization  Manual Techniques:  Myofascial release  Orthotic Fabrication:  Finger based volar gutter including long and ring fingers  Self Care:  Self Care Tasks  Discharge Plan:  Achieve all LTG.  Independent in home treatment program.  Reach maximal therapeutic benefit.    Home Exercise Program:  Hand based volar gutter, including long and ring  Blocking exercises   DIP   PIP  Tendon gliding    Next Visit:  Heat?  AROM  Orthosis modifications  MFR

## 2017-11-08 ENCOUNTER — THERAPY VISIT (OUTPATIENT)
Dept: OCCUPATIONAL THERAPY | Facility: CLINIC | Age: 42
End: 2017-11-08
Payer: COMMERCIAL

## 2017-11-08 DIAGNOSIS — M79.641 PAIN OF RIGHT HAND: ICD-10-CM

## 2017-11-08 PROCEDURE — 97110 THERAPEUTIC EXERCISES: CPT | Mod: GO | Performed by: OCCUPATIONAL THERAPIST

## 2017-11-08 PROCEDURE — 97018 PARAFFIN BATH THERAPY: CPT | Mod: GO | Performed by: OCCUPATIONAL THERAPIST

## 2017-11-08 NOTE — PROGRESS NOTES
"Hand Therapy Progress Note    Current Date:  11/8/2017    Diagnosis: R long and ring finger pain  DOI: 09/29/17 (MD order, has been going on for more than a year)  Procedure:  none    Precautions: NA    Reporting period is 11/1/17 to 11/8/2017    Subjective:   Subjective changes noted by patient:  \"It's been doing pretty well!\"  Functional changes noted by patient:  Improvement in Self Care Tasks (dressing, eating, bathing, hygiene/toileting), Work Tasks, Sleep Patterns, Recreational Activities, Household Chores and Driving   Patient has noted adverse reaction to:  None    Objective:  Pain Level Report  VAS(0-10) 11/1/2017 11/8/17   At Rest: 0/10 0/10   At worst: 5/10 3/10     Report of Pain:  Location:  long finger and ring finger; PIP joints  Pain Quality:  Aching  Frequency: intermittent    Pain is worst:  daytime  Exacerbated by:  Movement, use  Relieved by:  otc medications and rest  Progression:  improvoving  Finger Edema  Circumference:  (Measured in cm)  Long Finger  11/1/2017 11/8/17   Location: left right    P1 5.3 5.3 5.2   PIP 5.2 5.3 5.3   P2 4.6 4.8 4.8   DIP 4.4 4.6 4.5     Finger Edema  Circumference:  (Measured in cm)  Ring Finger  11/1/2017 11/8/17   Location: left right    P1 5.0 5.0 4.9   PIP 4.9 5.1 5.0   P2 4.3 4.5 4.4   DIP 4.0 4.1 4.0     Sensation: WNL throughout all nerve distributions; per patient report    ROM  Pain Report:  - none    + mild    ++ moderate    +++ severe   HAND 11/1/2017 11/8/17   AROM(PROM) right    Long MP 0/92 0/94   PIP 0/110 0/107   DIP 0/82 0/66   [place] 284 267   Ring MP 0/91 0/96   PIP 0/104 0/107   DIP 0/67 0/51   [place] 262 254     Tenderness 11/1/2017   Dorsal PIP joint of Long +   Dorsal DIP of Long +   Dorsal PIP of Ring -   Dorsal DIP of Ring -     Strength:   (Measured in pounds)  Pain Report:  - none    + mild    ++ moderate    +++ severe     11/1/2017 11/8/17   Trials left right    1  2  3 64  63  64 71++  67+  69+ 74  71  68   Average: 64 69 71     Lat " Pinch  11/1/2017   Trials left right   1  2  3 18  19  18 19  18  18   Average: 18 18     3 Pt Pinch  11/1/2017   Trials left right   1  2  3 23  22  23 23  21  20   Average: 23 21   Please refer to the daily flowsheet for treatment provided today.     Assessment:  Response to therapy has been improvement to:  Strength:    Pain:  frequency is less, intensity of pain is decreased, duration of pain is decreased and less tender over affected area    Overall Assessment:  Patient's symptoms are resolving.  STG/LTG:  STGoals have been reviewed and progress or achievement has occurred;  see goal sheet for details and updates.    Plan:  Frequency/Duration:  Continue 1x/more in 3 weeks.  Appropriateness of Rx I have re-evaluated this patient and find that the nature, scope, duration and intensity of the therapy is appropriate for the medical condition of the patient.  Recommendations for Continued Therapy  Added gripping    Home Exercise Program:  Hand based volar gutter, including long and ring  Blocking exercises   DIP   PIP  Tendon gliding    Next visit:  Heat  A/AA/PROM  Strengthening  DC to I HEP

## 2017-11-28 ENCOUNTER — TRANSFERRED RECORDS (OUTPATIENT)
Dept: HEALTH INFORMATION MANAGEMENT | Facility: CLINIC | Age: 42
End: 2017-11-28

## 2017-12-06 ENCOUNTER — TELEPHONE (OUTPATIENT)
Dept: GASTROENTEROLOGY | Facility: CLINIC | Age: 42
End: 2017-12-06

## 2017-12-06 NOTE — TELEPHONE ENCOUNTER
Patient scheduled for EGD    Indication for procedure. Throat irritation    Referring Provider. Payton Horn MD    ? Not Needed    Arrival time verified? Yes, 0800    Facility location verified? Yes, [address] [facility]    Instructions given regarding prep and procedure    Prep Type NPO    Are you taking any anticoagulants or blood thinners? No    Instructions given? N/A    Electronic implanted devices? No    Pre procedure teaching completed? Yes    Transportation from procedure? Mother    H&P / Pre op physical completed? N/A

## 2018-01-03 ENCOUNTER — HOSPITAL ENCOUNTER (OUTPATIENT)
Facility: AMBULATORY SURGERY CENTER | Age: 43
End: 2018-01-03
Attending: INTERNAL MEDICINE
Payer: COMMERCIAL

## 2018-01-03 ENCOUNTER — SURGERY (OUTPATIENT)
Age: 43
End: 2018-01-03

## 2018-01-03 VITALS
BODY MASS INDEX: 25.33 KG/M2 | RESPIRATION RATE: 16 BRPM | DIASTOLIC BLOOD PRESSURE: 71 MMHG | WEIGHT: 143 LBS | TEMPERATURE: 98.6 F | HEART RATE: 74 BPM | OXYGEN SATURATION: 95 % | SYSTOLIC BLOOD PRESSURE: 109 MMHG

## 2018-01-03 LAB
HCG UR QL: NEGATIVE
INTERNAL QC OK POCT: YES
UPPER GI ENDOSCOPY: NORMAL

## 2018-01-03 RX ORDER — LIDOCAINE 40 MG/G
CREAM TOPICAL
Status: DISCONTINUED | OUTPATIENT
Start: 2018-01-03 | End: 2018-01-04 | Stop reason: HOSPADM

## 2018-01-03 RX ORDER — MULTIPLE VITAMINS W/ MINERALS TAB 9MG-400MCG
1 TAB ORAL DAILY
COMMUNITY
End: 2022-01-25

## 2018-01-03 RX ORDER — FENTANYL CITRATE 50 UG/ML
INJECTION, SOLUTION INTRAMUSCULAR; INTRAVENOUS PRN
Status: DISCONTINUED | OUTPATIENT
Start: 2018-01-03 | End: 2018-01-03 | Stop reason: HOSPADM

## 2018-01-03 RX ORDER — SODIUM CHLORIDE, SODIUM LACTATE, POTASSIUM CHLORIDE, CALCIUM CHLORIDE 600; 310; 30; 20 MG/100ML; MG/100ML; MG/100ML; MG/100ML
INJECTION, SOLUTION INTRAVENOUS CONTINUOUS
Status: DISCONTINUED | OUTPATIENT
Start: 2018-01-03 | End: 2018-01-04 | Stop reason: HOSPADM

## 2018-01-03 RX ORDER — ONDANSETRON 2 MG/ML
4 INJECTION INTRAMUSCULAR; INTRAVENOUS
Status: DISCONTINUED | OUTPATIENT
Start: 2018-01-03 | End: 2018-01-04 | Stop reason: HOSPADM

## 2018-01-03 RX ADMIN — FENTANYL CITRATE 50 MCG: 50 INJECTION, SOLUTION INTRAMUSCULAR; INTRAVENOUS at 12:25

## 2018-01-03 RX ADMIN — FENTANYL CITRATE 100 MCG: 50 INJECTION, SOLUTION INTRAMUSCULAR; INTRAVENOUS at 12:21

## 2018-01-03 NOTE — IP AVS SNAPSHOT
MRN:[id number]                      After Visit Summary   1/3/2018    Deysi Douglas    MRN: [id number]           Thank you!     Thank you for choosing [facility] for your care. Our goal is always to provide you with excellent care. Hearing back from our patients is one way we can continue to improve our services. Please take a few minutes to complete the written survey that you may receive in the mail after you visit with us. Thank you!        Patient Information     Date Of Birth          1975        About your hospital stay     You were admitted on:  January 3, 2018 [facility]    You were discharged on:  January 3, 2018       Who to Call     For medical emergencies, please call 911.  For non-urgent questions about your medical care, please call your primary care provider or clinic, [phone]  For questions related to your surgery, please call your surgery clinic        Attending Provider     Provider Specialty    Nanci Esparza MD Hepatology       Primary Care Provider Office Phone # Fax #    Payton Alba Horn [phone]      Further instructions from your care team       .ucDischarge Instructions after Upper Endoscopy (EGD)       Activity and Diet   You were given medicine for pain. You may be dizzy or sleepy.   For 24 hours:     Do not drive or use heavy equipment.     Do not make important decisions.     Do not drink any alcohol.   ___ You may return to your regular diet.       Discomfort   You may have a sore throat for 2 to 3 days. It may help to:     Avoid hot liquids for 24 hours.     Use sore throat lozenges.     Gargle as needed with salt water up to 4 times a day. Mix 1 cup of warm water with 1 teaspoon of salt. Do not swallow.   ___ Your esophagus was dilated (opened) or banded during the exam:     Drink only cool liquids for the rest of the day. Eat a soft diet for the next few days.     You may  have a sore chest for 2 to 3 days.       You may take Tylenol (acetaminophen) for pain unless your doctor has told you not to.       Do not take aspirin or ibuprofen (Advil, Motrin) or other NSAIDS (anti-inflammatory drugs) for ___ days.       Follow-up   ___ We took small tissue samples for study. If you do not have a follow-up visit scheduled, call your provider s office in 2 weeks for the results.       Other instructions________________________________________________________       When to call us:   Problems are rare. Call right away if you have:     Unusual throat pain or trouble swallowing     Unusual pain in belly or chest that is not relieved by belching or passing air     Black stools (tar-like looking bowel movement)     Temperature above 100.6  F. (37.5  C).       If you vomit blood or have severe pain, go to an emergency room.       If you have questions, call:   Monday to Friday, 7 a.m. to 4:30 p.m.: Endoscopy: [phone] (We may have to call you back)       After hours: Hospital: [phone] (Ask for the GI fellow on call)     Pending Results     Date and Time Order Name Status Description    1/3/2018 1232 Surgical pathology exam In process             Admission Information     Date & Time Provider Department Dept. Phone    1/3/2018 Nanci Esparza MD [facility] [phone]      Your Vitals Were     Blood Pressure Pulse Temperature Respirations Weight Pulse Oximetry    104/72 74 98.7  F (37.1  C) (Temporal) 14 64.9 kg (143 lb) 94%    BMI (Body Mass Index)                   25.33 kg/m2           Tripl Information     [employer]pl gives you secure access to your electronic health record. If you see a primary care provider, you can also send messages to your care team and make appointments. If you have questions, please call your primary care clinic.  If you do not have a primary care provider, please call [phone] and they will assist you.      [employer] is an electronic  gateway that provides easy, online access to your medical records. With [employer], you can request a clinic appointment, read your test results, renew a prescription or communicate with your care team.     To access your existing account, please contact your [facility] or call [phone] for assistance.        Care EveryWhere ID     This is your Care EveryWhere ID. This could be used by other organizations to access your [place] medical records  [id number]        Equal Access to Services     JUNITO STEWART : Hadii geovanna desai hadasho Soomaali, waaxda luqadaha, qaybta kaalmada adeegyada, meka schaefer. So [facility] [phone].    ATENCIÓN: Si annabel manzano, tiene a medina disposición servicios gratuitos de asistencia lingüística. Llame al [phone].    We comply with applicable federal civil rights laws and [place] laws. We do not discriminate on the basis of race, color, national origin, age, disability, sex, sexual orientation, or gender identity.               Review of your medicines      UNREVIEWED medicines. Ask your doctor about these medicines        Dose / Directions    calcium + D 600-200 MG-UNIT Tabs   Generic drug:  calcium carbonate-vitamin D        2 TABLETS DAILY   Refills:  0       levothyroxine 100 MCG tablet   Commonly known as:  SYNTHROID/LEVOTHROID   Used for:  Hypothyroidism due to Hashimoto's thyroiditis        Dose:  100 mcg   Take 1 tablet (100 mcg) by mouth daily   Refills:  0       Multi-vitamin Tabs tablet        Dose:  1 tablet   Take 1 tablet by mouth daily   Refills:  0       valACYclovir 1000 mg tablet   Commonly known as:  VALTREX   Used for:  Herpes simplex virus infection        Dose:  2000 mg   Take 2 tablets (2,000 mg) by mouth 2 times daily   Quantity:  4 tablet   Refills:  11       ZYRTEC 10 MG tablet   Generic drug:  cetirizine        1 TABLET DAILY   Refills:  0                Protect others around you: Learn how to safely  use, store and throw away your medicines at www.disposemymeds.org.             Medication List: This is a list of all your medications and when to take them. Check marks below indicate your daily home schedule. Keep this list as a reference.      Medications           Morning Afternoon Evening Bedtime As Needed    calcium + D 600-200 MG-UNIT Tabs   2 TABLETS DAILY   Generic drug:  calcium carbonate-vitamin D                                levothyroxine 100 MCG tablet   Commonly known as:  SYNTHROID/LEVOTHROID   Take 1 tablet (100 mcg) by mouth daily                                Multi-vitamin Tabs tablet   Take 1 tablet by mouth daily                                valACYclovir 1000 mg tablet   Commonly known as:  VALTREX   Take 2 tablets (2,000 mg) by mouth 2 times daily                                ZYRTEC 10 MG tablet   1 TABLET DAILY   Generic drug:  cetirizine

## 2018-01-03 NOTE — DISCHARGE INSTRUCTIONS
.ucDischarge Instructions after Upper Endoscopy (EGD)       Activity and Diet   You were given medicine for pain. You may be dizzy or sleepy.   For 24 hours:     Do not drive or use heavy equipment.     Do not make important decisions.     Do not drink any alcohol.   ___ You may return to your regular diet.       Discomfort   You may have a sore throat for 2 to 3 days. It may help to:     Avoid hot liquids for 24 hours.     Use sore throat lozenges.     Gargle as needed with salt water up to 4 times a day. Mix 1 cup of warm water with 1 teaspoon of salt. Do not swallow.   ___ Your esophagus was dilated (opened) or banded during the exam:     Drink only cool liquids for the rest of the day. Eat a soft diet for the next few days.     You may have a sore chest for 2 to 3 days.       You may take Tylenol (acetaminophen) for pain unless your doctor has told you not to.       Do not take aspirin or ibuprofen (Advil, Motrin) or other NSAIDS (anti-inflammatory drugs) for ___ days.       Follow-up   ___ We took small tissue samples for study. If you do not have a follow-up visit scheduled, call your provider s office in 2 weeks for the results.       Other instructions________________________________________________________       When to call us:   Problems are rare. Call right away if you have:     Unusual throat pain or trouble swallowing     Unusual pain in belly or chest that is not relieved by belching or passing air     Black stools (tar-like looking bowel movement)     Temperature above 100.6  F. (37.5  C).       If you vomit blood or have severe pain, go to an emergency room.       If you have questions, call:   Monday to Friday, 7 a.m. to 4:30 p.m.: Endoscopy: [phone] (We may have to call you back)       After hours: Hospital: [phone] (Ask for the GI fellow on call)

## 2018-01-03 NOTE — IP AVS SNAPSHOT
[facility]    [address] [place] [address]    Phone:  [phone]    Fax:  [phone]                                       After Visit Summary   1/3/2018    Deysi Douglas    MRN: [id number]           After Visit Summary Signature Page     I have received my discharge instructions, and my questions have been answered. I have discussed any challenges I see with this plan with the nurse or doctor.    ..........................................................................................................................................  Patient/Patient Representative Signature      ..........................................................................................................................................  Patient Representative Print Name and Relationship to Patient    ..................................................               ................................................  Date                                            Time    ..........................................................................................................................................  Reviewed by Signature/Title    ...................................................              ..............................................  Date                                                            Time

## 2018-01-04 LAB — COPATH REPORT: NORMAL

## 2018-01-05 DIAGNOSIS — R10.13 DYSPEPSIA: ICD-10-CM

## 2018-01-05 DIAGNOSIS — K31.7 HYPERPLASTIC POLYP OF STOMACH: Primary | ICD-10-CM

## 2018-01-08 PROBLEM — M79.641 PAIN OF RIGHT HAND: Status: RESOLVED | Noted: 2017-11-01 | Resolved: 2018-01-08

## 2018-01-24 ENCOUNTER — OFFICE VISIT (OUTPATIENT)
Dept: FAMILY MEDICINE | Facility: CLINIC | Age: 43
End: 2018-01-24
Payer: COMMERCIAL

## 2018-01-24 VITALS
RESPIRATION RATE: 16 BRPM | WEIGHT: 139 LBS | DIASTOLIC BLOOD PRESSURE: 62 MMHG | HEART RATE: 75 BPM | SYSTOLIC BLOOD PRESSURE: 110 MMHG | OXYGEN SATURATION: 98 % | TEMPERATURE: 98.9 F | BODY MASS INDEX: 24.62 KG/M2

## 2018-01-24 DIAGNOSIS — F41.1 GAD (GENERALIZED ANXIETY DISORDER): ICD-10-CM

## 2018-01-24 DIAGNOSIS — E06.3 HYPOTHYROIDISM DUE TO HASHIMOTO'S THYROIDITIS: Primary | ICD-10-CM

## 2018-01-24 DIAGNOSIS — F32.0 MILD MAJOR DEPRESSION (H): ICD-10-CM

## 2018-01-24 PROCEDURE — 99214 OFFICE O/P EST MOD 30 MIN: CPT | Performed by: FAMILY MEDICINE

## 2018-01-24 PROCEDURE — 84443 ASSAY THYROID STIM HORMONE: CPT | Performed by: FAMILY MEDICINE

## 2018-01-24 PROCEDURE — 36415 COLL VENOUS BLD VENIPUNCTURE: CPT | Performed by: FAMILY MEDICINE

## 2018-01-24 NOTE — PROGRESS NOTES
"  SUBJECTIVE:   Deysi Douglas is a 42 year old female who presents to clinic today for the following health issues:      Pt is here to follow up on upper endoscopy 1-3-17, doctor had notes on h pylori pt wants information on.    42 year old well known to me with fairly severe MARCELO and history of binge eating disorder in remission, MDD in remission, alcohol use disorder in sustained remission as well as Hashimoto's hypothyroidism here today for:    1) Had endoscopy with Dr. Kumari and has some question about results.  Should she get H Pylori done?    2) Had episode of bad anxiety and ruminating again last month.  Resolved now with supplements from Dr. Slaughter and sleep.  But at the time was horrible, wonders if she should recheck her thyroid again as this was the trigger last time?    Problem list and histories reviewed & adjusted, as indicated.  Additional history: as documented    Reviewed and updated as needed this visit by clinical staff       Reviewed and updated as needed this visit by Provider         ROS:  Constitutional, HEENT, cardiovascular, pulmonary, gi and gu systems are negative, except as otherwise noted.    OBJECTIVE:     /62  Pulse 75  Temp 98.9  F (37.2  C) (Tympanic)  Resp 16  Wt 139 lb (63 kg)  LMP 01/10/2018 (Approximate)  SpO2 98%  BMI 24.62 kg/m2  Body mass index is 24.62 kg/(m^2).  GENERAL: healthy, alert and no distress  MENTAL STATUS EXAM:   Deysi is casually dressed and well groomed, sitting comfortably during encounter. Alert, awake, and in no acute distress. Eye contact is good. Speech is normal, not pressured.   Psychomotor activity is within normal limits and there no abnormal involuntary movements demonstrated. Mood is \"good\" and affect is anxious, but euthymic with good range and reactivity. [place] is generally positive and hopeful despite recent stressors. Thought process is linear, logical, and goal directed. Thought content is free of suicidal or homicidal " "ideation, hallucinations or other symptoms of psychosis. Insight and judgement are fair to good. Generally oriented. No difficulty with memory, attention, or cognition. Associations intact, gait and station within normal limits.  \"Worrier\"      Diagnostic Test Results:  TSH   Date Value Ref Range Status   10/25/2017 3.16 0.40 - 4.00 mU/L Final   03/10/2017 1.90 0.40 - 4.00 mU/L Final   12/02/2016 3.30 0.40 - 5.00 mU/L Final   04/12/2016 1.78 0.40 - 4.00 mU/L Final   07/07/2015 2.24 0.40 - 4.00 mU/L Final     TSH from :  2.31 on 11/13/2017  1.5 on 9/13/2017  2.6 on 8/3/2017  1.2 on 6/19/17  0.3 on 5/23/2017    ASSESSMENT/PLAN:     Deysi was seen today for recheck.    Diagnoses and all orders for this visit:    Hypothyroidism due to Hashimoto's thyroiditis  -     TSH with free T4 reflex    MARCELO (generalized anxiety disorder)    Mild major depression (H)      MARCELO and MDD, improved control but with \"exacerbation\" last month.   --Check TSH today.   --remember SLEEP is your friend when anxiety worsens.   --Continue excellent work through Dr. Slaughter psychiatrist.    For endoscopy:   --reviewed results with patient.   --She'll  H Pylori today at our lab.    Answered and discussed multiple other minor questions with patient about self and daughter.    Greater than 25 minutes total were spent on this encounter, of which more than 50% was spent in direct communication with the patient including history, exam, counseling and coordination of care.       Payton Horn MD  [facility]    "

## 2018-01-24 NOTE — NURSING NOTE
"Chief Complaint   Patient presents with     RECHECK     upper endoscopy       Initial /62  Pulse 75  Temp 98.9  F (37.2  C) (Tympanic)  Resp 16  Wt 139 lb (63 kg)  LMP 01/10/2018 (Approximate)  SpO2 98%  BMI 24.62 kg/m2 Estimated body mass index is 24.62 kg/(m^2) as calculated from the following:    Height as of 6/9/17: 5' 3\" (1.6 m).    Weight as of this encounter: 139 lb (63 kg).  Medication Reconciliation: complete   .Car FUENTES      "

## 2018-01-25 LAB — TSH SERPL DL<=0.005 MIU/L-ACNC: 0.88 MU/L (ref 0.4–4)

## 2018-01-30 NOTE — PROGRESS NOTES
Renny Jo:  Your TSH is back and is in the normal range, but is a bit more corrected than your last visit and check either with us or with the endocrinologists.  You may want to discuss with your endocrinologist adjusting your thyroid dose slightly for a TSH goal of 2-3, as that seems to be where you feel best and your anxiety is the least.  But - as this is a good result and in the normal range - if you are feeling fine no adjustments are necessary.  I do think you should have this checked again in about 3 months.   Let me know if you have any questions about this.  Dr. Payton Horn MD  [facility]  [phone]

## 2018-05-04 ENCOUNTER — OFFICE VISIT (OUTPATIENT)
Dept: FAMILY MEDICINE | Facility: CLINIC | Age: 43
End: 2018-05-04
Payer: COMMERCIAL

## 2018-05-04 VITALS
TEMPERATURE: 97.4 F | DIASTOLIC BLOOD PRESSURE: 60 MMHG | SYSTOLIC BLOOD PRESSURE: 110 MMHG | BODY MASS INDEX: 24.71 KG/M2 | OXYGEN SATURATION: 100 % | WEIGHT: 139.5 LBS | HEART RATE: 86 BPM

## 2018-05-04 DIAGNOSIS — E06.3 HYPOTHYROIDISM DUE TO HASHIMOTO'S THYROIDITIS: Primary | ICD-10-CM

## 2018-05-04 DIAGNOSIS — F32.0 MILD MAJOR DEPRESSION (H): ICD-10-CM

## 2018-05-04 PROCEDURE — 84443 ASSAY THYROID STIM HORMONE: CPT | Performed by: FAMILY MEDICINE

## 2018-05-04 PROCEDURE — 99214 OFFICE O/P EST MOD 30 MIN: CPT | Performed by: FAMILY MEDICINE

## 2018-05-04 PROCEDURE — 36415 COLL VENOUS BLD VENIPUNCTURE: CPT | Performed by: FAMILY MEDICINE

## 2018-05-04 ASSESSMENT — ANXIETY QUESTIONNAIRES
5. BEING SO RESTLESS THAT IT IS HARD TO SIT STILL: NOT AT ALL
GAD7 TOTAL SCORE: 4
6. BECOMING EASILY ANNOYED OR IRRITABLE: SEVERAL DAYS
1. FEELING NERVOUS, ANXIOUS, OR ON EDGE: SEVERAL DAYS
GAD7 TOTAL SCORE: 4
3. WORRYING TOO MUCH ABOUT DIFFERENT THINGS: SEVERAL DAYS
7. FEELING AFRAID AS IF SOMETHING AWFUL MIGHT HAPPEN: NOT AT ALL
GAD7 TOTAL SCORE: 4
7. FEELING AFRAID AS IF SOMETHING AWFUL MIGHT HAPPEN: NOT AT ALL
4. TROUBLE RELAXING: NOT AT ALL
2. NOT BEING ABLE TO STOP OR CONTROL WORRYING: SEVERAL DAYS

## 2018-05-04 ASSESSMENT — PATIENT HEALTH QUESTIONNAIRE - PHQ9
SUM OF ALL RESPONSES TO PHQ QUESTIONS 1-9: 6
SUM OF ALL RESPONSES TO PHQ QUESTIONS 1-9: 6
10. IF YOU CHECKED OFF ANY PROBLEMS, HOW DIFFICULT HAVE THESE PROBLEMS MADE IT FOR YOU TO DO YOUR WORK, TAKE CARE OF THINGS AT HOME, OR GET ALONG WITH OTHER PEOPLE: SOMEWHAT DIFFICULT

## 2018-05-04 NOTE — LETTER
My Depression Action Plan  Name: Deysi Douglas   Date of Birth 1975  Date: 5/4/2018    My doctor: Payton Horn   My clinic: [address]  [address]  [facility] [address] [phone]          GREEN    ZONE   Good Control    What it looks like:     Things are going generally well. You have normal up s and down s. You may even feel depressed from time to time, but bad moods usually last less than a day.   What you need to do:  1. Continue to care for yourself (see self care plan)  2. Check your depression survival kit and update it as needed  3. Follow your physician s recommendations including any medication.  4. Do not stop taking medication unless you consult with your physician first.           YELLOW         ZONE Getting Worse    What it looks like:     Depression is starting to interfere with your life.     It may be hard to get out of bed; you may be starting to isolate yourself from others.    Symptoms of depression are starting to last most all day and this has happened for several days.     You may have suicidal thoughts but they are not constant.   What you need to do:     1. Call your care team, your response to treatment will improve if you keep your care team informed of your progress. Yellow periods are signs an adjustment may need to be made.     2. Continue your self-care, even if you have to fake it!    3. Talk to someone in your support network    4. Open up your depression survival kit           RED    ZONE Medical Alert - Get Help    What it looks like:     Depression is seriously interfering with your life.     You may experience these or other symptoms: You can t get out of bed most days, can t work or engage in other necessary activities, you have trouble taking care of basic hygiene, or basic responsibilities, thoughts of suicide or death that will not go away, self-injurious behavior.     What you need to  do:  1. Call your care team and request a same-day appointment. If they are not available (weekends or after hours) call your local crisis line, emergency room or 911.            Depression Self Care Plan / Survival Kit    Self-Care for Depression  Here s the deal. Your body and mind are really not as separate as most people think.  What you do and think affects how you feel and how you feel influences what you do and think. This means if you do things that people who feel good do, it will help you feel better.  Sometimes this is all it takes.  There is also a place for medication and therapy depending on how severe your depression is, so be sure to consult with your medical provider and/ or Behavioral Health Consultant if your symptoms are worsening or not improving.     In order to better manage my stress, I will:    Exercise  Get some form of exercise, every day. This will help reduce pain and release endorphins, the  feel good  chemicals in your brain. This is almost as good as taking antidepressants!  This is not the same as joining a gym and then never going! (they count on that by the way ) It can be as simple as just going for a walk or doing some gardening, anything that will get you moving.      Hygiene   Maintain good hygiene (Get out of bed in the morning, Make your bed, Brush your teeth, Take a shower, and Get dressed like you were going to work, even if you are unemployed).  If your clothes don't fit try to get ones that do.    Diet  I will strive to eat foods that are good for me, drink plenty of water, and avoid excessive sugar, caffeine, alcohol, and other mood-altering substances.  Some foods that are helpful in depression are: complex carbohydrates, B vitamins, flaxseed, fish or fish oil, fresh fruits and vegetables.    Psychotherapy  I agree to participate in Individual Therapy (if recommended).    Medication  If prescribed medications, I agree to take them.  Missing doses can result in serious  side effects.  I understand that drinking alcohol, or other illicit drug use, may cause potential side effects.  I will not stop my medication abruptly without first discussing it with my provider.    Staying Connected With Others  I will stay in touch with my friends, family members, and my primary care provider/team.    Use your imagination  Be creative.  We all have a creative side; it doesn t matter if it s oil painting, sand castles, or mud pies! This will also kick up the endorphins.    Witness Beauty  (AKA stop and smell the roses) Take a look outside, even in mid-winter. Notice colors, textures. Watch the squirrels and birds.     Service to others  Be of service to others.  There is always someone else in need.  By helping others we can  get out of ourselves  and remember the really important things.  This also provides opportunities for practicing all the other parts of the program.    Humor  Laugh and be silly!  Adjust your TV habits for less news and crime-drama and more comedy.    Control your stress  Try breathing deep, massage therapy, biofeedback, and meditation. Find time to relax each day.     My support system    Clinic Contact:  Phone number:    Contact 1:  Phone number:    Contact 2:  Phone number:    Yazidi/:  Phone number:    Therapist:  Phone number:    Local crisis center:    Phone number:    Other community support:  Phone number:

## 2018-05-04 NOTE — PROGRESS NOTES
SUBJECTIVE:   Deysi Douglas is a 43 year old female who presents to clinic today for the following health issues:      Hypothyroidism Follow-up      Since last visit, patient describes the following symptoms: Weight stable, no hair loss, no skin changes, no constipation, no loose stools and anxiety      Amount of exercise or physical activity: 2-3 days/week for an average of 30-45 minutes    Problems taking medications regularly: No    Medication side effects: none    Diet: gluten-free/reduced and dairy    Seeing Dr. Slaughter every month.  Great therapist every week.  Cut out gluten, diary and corn.  Helps.  Going to mindful self compassion class - [employer].  Wonderful.  Working on less perfectionism.    Biggest overwhelming thing - Pau's obsession with her body.      Problem list and histories reviewed & adjusted, as indicated.  Additional history: as documented    Reviewed and updated as needed this visit by clinical staff  Tobacco  Allergies  Meds  Problems  Med Hx  Surg Hx  Fam Hx  Soc Hx        Reviewed and updated as needed this visit by Provider  Meds  Problems         ROS:  Constitutional, HEENT, cardiovascular, pulmonary, gi and gu systems are negative, except as otherwise noted.    OBJECTIVE:     /60 (Cuff Size: Adult Regular)  Pulse 86  Temp 97.4  F (36.3  C) (Tympanic)  Wt 139 lb 8 oz (63.3 kg)  LMP 04/22/2018 (Approximate)  SpO2 100%  BMI 24.71 kg/m2  Body mass index is 24.71 kg/(m^2).  GENERAL: healthy, alert and no distress  PSYCH: mentation appears normal, tearful, anxious, judgement and insight intact and appearance well groomed    ASSESSMENT/PLAN:       Deysi was seen today for recheck.    Diagnoses and all orders for this visit:    Hypothyroidism due to Hashimoto's thyroiditis  -     TSH with free T4 reflex    Mild major depression (H)    Other orders  -     DEPRESSION ACTION PLAN (DAP)      Plan:  --Check TSH today.  --Offered support for Deysi in parenting and  stress level.  She has significant support of family, psychiatrist, therapist.  Seeing Dr. Milagros Banks next week as well.  --Will send letter with results.  --Follow up if symptoms worsen or don't improve.    Greater than 25 minutes total were spent face to face with the patient including history, exam, counseling and coordination of care.  Payton Horn MD  [facility]    Answers for HPI/ROS submitted by the patient on 5/4/2018   If you checked off any problems, how difficult have these problems made it for you to do your work, take care of things at home, or get along with other people?: Somewhat difficult  PHQ9 TOTAL SCORE: 6  MARCELO 7 TOTAL SCORE: 4

## 2018-05-04 NOTE — MR AVS SNAPSHOT
After Visit Summary   5/4/2018    Deysi Douglas    MRN: [id number]           Patient Information     Date Of Birth          1975        Visit Information        Provider Department      5/4/2018 8:00 AM Payton Horn MD [facility]        Today's Diagnoses     Hypothyroidism due to Hashimoto's thyroiditis    -  1    Mild major depression (H)           Follow-ups after your visit        Who to contact     If you have questions or need follow up information about today's clinic visit or your schedule please contact [facility] directly at [phone].  Normal or non-critical lab and imaging results will be communicated to you by MyChart, letter or phone within 4 business days after the clinic has received the results. If you do not hear from us within 7 days, please contact the clinic through [employer] or phone. If you have a critical or abnormal lab result, we will notify you by phone as soon as possible.  Submit refill requests through [employer] or call your pharmacy and they will forward the refill request to us. Please allow 3 business days for your refill to be completed.          Additional Information About Your Visit        MyChart Information     [employer] gives you secure access to your electronic health record. If you see a primary care provider, you can also send messages to your care team and make appointments. If you have questions, please call your primary care clinic.  If you do not have a primary care provider, please call [phone] and they will assist you.        Care EveryWhere ID     This is your Care EveryWhere ID. This could be used by other organizations to access your [place] medical records  [id number]        Your Vitals Were     Pulse Temperature Last Period Pulse Oximetry BMI (Body Mass Index)       86 97.4  F (36.3  C) (Tympanic) 04/22/2018 (Approximate) 100% 24.71 kg/m2        Blood Pressure  from Last 3 Encounters:   05/04/18 110/60   01/24/18 110/62   01/03/18 109/71    Weight from Last 3 Encounters:   05/04/18 139 lb 8 oz (63.3 kg)   01/24/18 139 lb (63 kg)   01/03/18 143 lb (64.9 kg)              We Performed the Following     DEPRESSION ACTION PLAN (DAP)     TSH with free T4 reflex        Primary Care Provider Office Phone # Fax #    Payton Horn [phone] [address] [facility] [address]        Equal Access to Services     [facility]: Hadii geovanna Jones, waaxmegan regalado, qaybanu burtonalmamegan welsh, meka blackwood . So [facility] [phone].    ATENCIÓN: Si habla español, tiene a medina disposición servicios gratuitos de asistencia lingüística. [facility] [phone].    We comply with applicable federal civil rights laws and [place] laws. We do not discriminate on the basis of race, color, national origin, age, disability, sex, sexual orientation, or gender identity.            Thank you!     Thank you for choosing [facility]  for your care. Our goal is always to provide you with excellent care. Hearing back from our patients is one way we can continue to improve our services. Please take a few minutes to complete the written survey that you may receive in the mail after your visit with us. Thank you!             Your Updated Medication List - Protect others around you: Learn how to safely use, store and throw away your medicines at www.disposemymeds.org.          This list is accurate as of 5/4/18  9:00 AM.  Always use your most recent med list.                   Brand Name Dispense Instructions for use Diagnosis    calcium + D 600-200 MG-UNIT Tabs   Generic drug:  calcium carbonate-vitamin D      2 TABLETS DAILY        levothyroxine 100 MCG tablet    SYNTHROID/LEVOTHROID     Take 1 tablet (100 mcg) by mouth daily    Hypothyroidism due to Hashimoto's thyroiditis       Multi-vitamin Tabs tablet       Take 1 tablet by mouth daily        valACYclovir 1000 mg tablet    VALTREX    4 tablet    Take 2 tablets (2,000 mg) by mouth 2 times daily    Herpes simplex virus infection       ZYRTEC 10 MG tablet   Generic drug:  cetirizine      1 TABLET DAILY

## 2018-05-04 NOTE — NURSING NOTE
"Chief Complaint   Patient presents with     RECHECK     thyroid     /60 (Cuff Size: Adult Regular)  Pulse 86  Temp 97.4  F (36.3  C) (Tympanic)  Wt 139 lb 8 oz (63.3 kg)  LMP 04/22/2018 (Approximate)  SpO2 100%  BMI 24.71 kg/m2 Estimated body mass index is 24.71 kg/(m^2) as calculated from the following:    Height as of 6/9/17: 5' 3\" (1.6 m).    Weight as of this encounter: 139 lb 8 oz (63.3 kg).  BP completed using cuff size: regular   Dr. Aparicio    Health Maintenance Due   Topic Date Due     PHQ-9 Q6 MONTHS  04/25/2018     MARCELO QUESTIONNAIRE 1 YEAR  05/09/2018     DEPRESSION ACTION PLAN Q1 YR  05/09/2018     Health Maintenance reviewed at today's visit patient asked to schedule/complete:   Depression:  Completed at today's visit.      "

## 2018-05-05 LAB — TSH SERPL DL<=0.005 MIU/L-ACNC: 2.82 MU/L (ref 0.4–4)

## 2018-05-05 ASSESSMENT — ANXIETY QUESTIONNAIRES: GAD7 TOTAL SCORE: 4

## 2018-05-05 ASSESSMENT — PATIENT HEALTH QUESTIONNAIRE - PHQ9: SUM OF ALL RESPONSES TO PHQ QUESTIONS 1-9: 6

## 2018-05-05 NOTE — PROGRESS NOTES
Renny Jo:  Nice to see you yesterday.  Your TSH is in the normal range, but higher than 3 months ago.  You may find that you feel better when it's in between 1-2.  I think you said your endocrinologist has left the practice - would you like us to help you adjust your dose while you are looking for a new one or do you have resources in place for this already?  Best,  Dr. Payton Horn MD/[facility]

## 2018-05-06 ENCOUNTER — MYC MEDICAL ADVICE (OUTPATIENT)
Dept: FAMILY MEDICINE | Facility: CLINIC | Age: 43
End: 2018-05-06

## 2018-05-06 DIAGNOSIS — E06.3 HYPOTHYROIDISM DUE TO HASHIMOTO'S THYROIDITIS: ICD-10-CM

## 2018-05-08 RX ORDER — LEVOTHYROXINE SODIUM 112 UG/1
112 TABLET ORAL EVERY OTHER DAY
Qty: 60 TABLET | Refills: 1 | Status: SHIPPED | OUTPATIENT
Start: 2018-05-08 | End: 2021-05-25

## 2018-05-08 RX ORDER — LEVOTHYROXINE SODIUM 100 UG/1
100 TABLET ORAL EVERY OTHER DAY
Qty: 30 TABLET | Refills: 0 | Status: SHIPPED | OUTPATIENT
Start: 2018-05-08 | End: 2021-05-25

## 2018-06-29 DIAGNOSIS — Z12.31 VISIT FOR SCREENING MAMMOGRAM: ICD-10-CM

## 2018-06-29 PROCEDURE — 77067 SCR MAMMO BI INCL CAD: CPT | Mod: TC

## 2018-06-29 PROCEDURE — 77063 BREAST TOMOSYNTHESIS BI: CPT | Mod: TC

## 2018-08-09 ENCOUNTER — OFFICE VISIT (OUTPATIENT)
Dept: FAMILY MEDICINE | Facility: CLINIC | Age: 43
End: 2018-08-09
Payer: COMMERCIAL

## 2018-08-09 VITALS
SYSTOLIC BLOOD PRESSURE: 105 MMHG | OXYGEN SATURATION: 97 % | DIASTOLIC BLOOD PRESSURE: 67 MMHG | WEIGHT: 134.9 LBS | HEIGHT: 63 IN | HEART RATE: 82 BPM | BODY MASS INDEX: 23.9 KG/M2

## 2018-08-09 DIAGNOSIS — E06.3 HYPOTHYROIDISM DUE TO HASHIMOTO'S THYROIDITIS: Primary | ICD-10-CM

## 2018-08-09 DIAGNOSIS — R53.83 OTHER FATIGUE: ICD-10-CM

## 2018-08-09 LAB
DEPRECATED CALCIDIOL+CALCIFEROL SERPL-MC: 53 UG/L (ref 20–75)
T3FREE SERPL-MCNC: 2.6 PG/ML (ref 2.3–4.2)
T4 FREE SERPL-MCNC: 1.51 NG/DL (ref 0.76–1.46)
TSH SERPL DL<=0.005 MIU/L-ACNC: 0.5 MU/L (ref 0.4–4)

## 2018-08-09 PROCEDURE — 84439 ASSAY OF FREE THYROXINE: CPT | Performed by: PHYSICIAN ASSISTANT

## 2018-08-09 PROCEDURE — 82306 VITAMIN D 25 HYDROXY: CPT | Performed by: PHYSICIAN ASSISTANT

## 2018-08-09 PROCEDURE — 36415 COLL VENOUS BLD VENIPUNCTURE: CPT | Performed by: PHYSICIAN ASSISTANT

## 2018-08-09 PROCEDURE — 99214 OFFICE O/P EST MOD 30 MIN: CPT | Performed by: PHYSICIAN ASSISTANT

## 2018-08-09 PROCEDURE — 83090 ASSAY OF HOMOCYSTEINE: CPT | Performed by: PHYSICIAN ASSISTANT

## 2018-08-09 PROCEDURE — 84481 FREE ASSAY (FT-3): CPT | Performed by: PHYSICIAN ASSISTANT

## 2018-08-09 PROCEDURE — 84443 ASSAY THYROID STIM HORMONE: CPT | Performed by: PHYSICIAN ASSISTANT

## 2018-08-09 NOTE — PROGRESS NOTES
"  SUBJECTIVE:   Deysi Douglas is a 43 year old female who presents to clinic today for the following health issues:      Hypothyroidism Follow-up      Since last visit, patient describes the following symptoms: Weight stable, no hair loss, no skin changes, no constipation, no loose stools and fatigue      Amount of exercise or physical activity: 4-5 days/week for an average of 30-45 minutes    Problems taking medications regularly: No    Medication side effects: none    Diet: gluten-free/reduced and dairy free            Problem list and histories reviewed & adjusted, as indicated.  Additional history: 44 y/o female here to discuss some symptoms that may go along with her potential thyroid dosage.  She recently has been doing a lot of travel, and she has been having some increase in fatigue and even some episodes of anxiety.  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  She is leaving for [place] soon, and just wants to rule this out.    She does wonder about vitamin d and b vitamins in general, and if it could be playing role in her symptoms.  She does follow very restrictive diet due to food allergy testing.  She does work with group in [place], getting some sublingual therapy.      BP Readings from Last 3 Encounters:   08/09/18 105/67   05/04/18 110/60   01/24/18 110/62    Wt Readings from Last 3 Encounters:   08/09/18 134 lb 14.4 oz (61.2 kg)   05/04/18 139 lb 8 oz (63.3 kg)   01/24/18 139 lb (63 kg)                    Reviewed and updated as needed this visit by clinical staff       Reviewed and updated as needed this visit by Provider         ROS:  Constitutional, HEENT, cardiovascular, pulmonary, gi and gu systems are negative, except as otherwise noted.    OBJECTIVE:     /67  Pulse 82  Ht 5' 3\" (1.6 m)  Wt 134 lb 14.4 oz (61.2 kg)  LMP 07/31/2018  SpO2 97%  Breastfeeding? No  BMI 23.9 kg/m2  Body " mass index is 23.9 kg/(m^2).  GENERAL: alert and no distress  EYES: Eyes grossly normal to inspection  NECK: no adenopathy, no asymmetry, masses, or scars and thyroid normal to palpation  RESP: lungs clear to auscultation - no rales, rhonchi or wheezes  CV: regular rate and rhythm, normal S1 S2, no S3 or S4, no murmur, click or rub, no peripheral edema and peripheral pulses strong  PSYCH: mentation appears normal, affect normal/bright    Diagnostic Test Results:  none     ASSESSMENT/PLAN:             1. Hypothyroidism due to Hashimoto's thyroiditis  Discussed selenium supplementation with positive antibodies.  Unable to ear Brazil nuts secondary to allergy.  Will update thyroid labs and adjust as needed.  - TSH  - T4, free  - T3, Free    2. Other fatigue  Will screen for vitamin D deficiency and possible poor methylation with homocysteine.    - Vitamin D Deficiency  - Homocysteine        Gary Sands PA-C  [facility]

## 2018-08-09 NOTE — MR AVS SNAPSHOT
"              After Visit Summary   8/9/2018    Deysi Douglas    MRN: [id number]           Patient Information     Date Of Birth          1975        Visit Information        Provider Department      8/9/2018 9:40 AM Gary Sands PA-C [facility]        Today's Diagnoses     Hypothyroidism due to Hashimoto's thyroiditis    -  1    Other fatigue           Follow-ups after your visit        Follow-up notes from your care team     Return if symptoms worsen or fail to improve.      Who to contact     If you have questions or need follow up information about today's clinic visit or your schedule please contact [facility] directly at [phone].  Normal or non-critical lab and imaging results will be communicated to you by [employer], letter or phone within 4 business days after the clinic has received the results. If you do not hear from us within 7 days, please contact the clinic through [employer] or phone. If you have a critical or abnormal lab result, we will notify you by phone as soon as possible.  Submit refill requests through [email or web address] or call your pharmacy and they will forward the refill request to us. Please allow 3 business days for your refill to be completed.          Additional Information About Your Visit        MyChart Information     [employer] gives you secure access to your electronic health record. If you see a primary care provider, you can also send messages to your care team and make appointments. If you have questions, please call your primary care clinic.  If you do not have a primary care provider, please call [phone] and they will assist you.        Care EveryWhere ID     This is your Care EveryWhere ID. This could be used by other organizations to access your [place] medical records  [id number]        Your Vitals Were     Pulse Height Last Period Pulse Oximetry Breastfeeding? BMI (Body Mass Index)    82 5' 3\" (1.6 m) 07/31/2018 97% No 23.9 kg/m2    "    Blood Pressure from Last 3 Encounters:   08/09/18 105/67   05/04/18 110/60   01/24/18 110/62    Weight from Last 3 Encounters:   08/09/18 134 lb 14.4 oz (61.2 kg)   05/04/18 139 lb 8 oz (63.3 kg)   01/24/18 139 lb (63 kg)              We Performed the Following     Homocysteine     T3, Free     T4, free     TSH     Vitamin D Deficiency        Primary Care Provider Office Phone # Fax #    Payton Horn [phone] [address] [facility] [address]        Equal Access to Services     [facility]: Hadii geovanna Jones, washelbie regalado, norris welsh, meka blackwood . So [facility] [phone].    ATENCIÓN: Si habla español, tiene a medina disposición servicios gratuitos de asistencia lingüística. [facility] [phone].    We comply with applicable federal civil rights laws and [place] laws. We do not discriminate on the basis of race, color, national origin, age, disability, sex, sexual orientation, or gender identity.            Thank you!     Thank you for choosing [facility]  for your care. Our goal is always to provide you with excellent care. Hearing back from our patients is one way we can continue to improve our services. Please take a few minutes to complete the written survey that you may receive in the mail after your visit with us. Thank you!             Your Updated Medication List - Protect others around you: Learn how to safely use, store and throw away your medicines at www.disposemymeds.org.          This list is accurate as of 8/9/18  3:31 PM.  Always use your most recent med list.                   Brand Name Dispense Instructions for use Diagnosis    calcium + D 600-200 MG-UNIT Tabs   Generic drug:  calcium carbonate-vitamin D      2 TABLETS DAILY        * levothyroxine 100 MCG tablet    SYNTHROID/LEVOTHROID    30 tablet    Take 1 tablet (100 mcg) by mouth every other day Alternating with 112 mcg dose.     Hypothyroidism due to Hashimoto's thyroiditis       * levothyroxine 112 MCG tablet    SYNTHROID/LEVOTHROID    60 tablet    Take 1 tablet (112 mcg) by mouth every other day alternating with 100 mcg dose.    Hypothyroidism due to Hashimoto's thyroiditis       Multi-vitamin Tabs tablet      Take 1 tablet by mouth daily        valACYclovir 1000 mg tablet    VALTREX    4 tablet    Take 2 tablets (2,000 mg) by mouth 2 times daily    Herpes simplex virus infection       ZYRTEC 10 MG tablet   Generic drug:  cetirizine      1 TABLET DAILY        * Notice:  This list has 2 medication(s) that are the same as other medications prescribed for you. Read the directions carefully, and ask your doctor or other care provider to review them with you.

## 2018-08-10 NOTE — PROGRESS NOTES
Dear Deysi    Your test results are attached, feel free to contact me via [employer]     It does not look like your thyroid level is too low.  The TSH and free T3 are in optimal ranges.  Your free T4 is a bit high, but that may be due to recent taking of your medication.  Your vitamin D level looks good at 53 as well.  The homocysteine (b vitamin) test does take several days to come back, so I will let you know when that comes back.    Justen Sands PA-C

## 2018-08-15 LAB — HCYS SERPL-SCNC: 6.3 UMOL/L (ref 4–12)

## 2018-10-06 ENCOUNTER — TELEPHONE (OUTPATIENT)
Dept: FAMILY MEDICINE | Facility: CLINIC | Age: 43
End: 2018-10-06

## 2018-10-06 NOTE — TELEPHONE ENCOUNTER
Received form(s) from [employer] for TREATMENT.  Placed form(s) in/on [facility] box.  Forms need to be filled out and signed and faxed to number listed on form..    Call pt to verify form was sent: No  Copy needs to be sent for scanning after completion: Yes

## 2018-12-05 ENCOUNTER — TRANSFERRED RECORDS (OUTPATIENT)
Dept: HEALTH INFORMATION MANAGEMENT | Facility: CLINIC | Age: 43
End: 2018-12-05

## 2018-12-28 ENCOUNTER — TRANSFERRED RECORDS (OUTPATIENT)
Dept: HEALTH INFORMATION MANAGEMENT | Facility: CLINIC | Age: 43
End: 2018-12-28

## 2019-01-28 ENCOUNTER — TRANSFERRED RECORDS (OUTPATIENT)
Dept: HEALTH INFORMATION MANAGEMENT | Facility: CLINIC | Age: 44
End: 2019-01-28

## 2019-11-11 ENCOUNTER — ALLIED HEALTH/NURSE VISIT (OUTPATIENT)
Dept: NURSING | Facility: CLINIC | Age: 44
End: 2019-11-11
Payer: COMMERCIAL

## 2019-11-11 DIAGNOSIS — Z23 NEED FOR PROPHYLACTIC VACCINATION AND INOCULATION AGAINST INFLUENZA: Primary | ICD-10-CM

## 2019-11-11 PROCEDURE — 90471 IMMUNIZATION ADMIN: CPT

## 2019-11-11 PROCEDURE — 90686 IIV4 VACC NO PRSV 0.5 ML IM: CPT

## 2019-11-11 PROCEDURE — 99207 ZZC NO CHARGE NURSE ONLY: CPT

## 2019-11-11 NOTE — NURSING NOTE
Chief Complaint   Patient presents with     Flu     Shot     Imm/Inj     Flu Shot   Prior to immunization administration, verified patients identity using patient s name and date of birth. Please see Immunization Activity for additional information.     Screening Questionnaire for Adult Immunization    Are you sick today?   No   Do you have allergies to medications, food, a vaccine component or latex?   No   Have you ever had a serious reaction after receiving a vaccination?   No   Do you have a long-term health problem with heart disease, lung disease, asthma, kidney disease, metabolic disease (e.g. diabetes), anemia, or other blood disorder?   No   Do you have cancer, leukemia, HIV/AIDS, or any other immune system problem?   No   In the past 3 months, have you taken medications that affect  your immune system, such as prednisone, other steroids, or anticancer drugs; drugs for the treatment of rheumatoid arthritis, Crohn s disease, or psoriasis; or have you had radiation treatments?   No   Have you had a seizure, or a brain or other nervous system problem?   No   During the past year, have you received a transfusion of blood or blood     products, or been given immune (gamma) globulin or antiviral drug?   No   For women: Are you pregnant or is there a chance you could become        pregnant during the next month?   No   Have you received any vaccinations in the past 4 weeks?   No     Immunization questionnaire answers were all negative.        Per orders of Justen Sands, injection of Influenza given by Jessica Patel CMA. Patient instructed to remain in clinic for 15 minutes afterwards, and to report any adverse reaction to me immediately.       Screening performed by Jessica Patel CMA on 11/11/2019 at 4:00 PM.

## 2019-12-02 ENCOUNTER — TRANSFERRED RECORDS (OUTPATIENT)
Dept: HEALTH INFORMATION MANAGEMENT | Facility: CLINIC | Age: 44
End: 2019-12-02

## 2019-12-09 ENCOUNTER — RECORDS - HEALTHEAST (OUTPATIENT)
Dept: ADMINISTRATIVE | Facility: OTHER | Age: 44
End: 2019-12-09

## 2019-12-09 ENCOUNTER — OFFICE VISIT (OUTPATIENT)
Dept: FAMILY MEDICINE | Facility: CLINIC | Age: 44
End: 2019-12-09
Payer: COMMERCIAL

## 2019-12-09 VITALS
BODY MASS INDEX: 28.39 KG/M2 | HEART RATE: 65 BPM | WEIGHT: 160.2 LBS | DIASTOLIC BLOOD PRESSURE: 71 MMHG | OXYGEN SATURATION: 100 % | SYSTOLIC BLOOD PRESSURE: 108 MMHG | RESPIRATION RATE: 14 BRPM | HEIGHT: 63 IN

## 2019-12-09 DIAGNOSIS — R42 VERTIGO: Primary | ICD-10-CM

## 2019-12-09 DIAGNOSIS — S06.0X0D CONCUSSION WITHOUT LOSS OF CONSCIOUSNESS, SUBSEQUENT ENCOUNTER: ICD-10-CM

## 2019-12-09 PROCEDURE — 99213 OFFICE O/P EST LOW 20 MIN: CPT | Performed by: PHYSICIAN ASSISTANT

## 2019-12-09 RX ORDER — ESCITALOPRAM OXALATE 10 MG/1
10 TABLET ORAL DAILY
COMMUNITY

## 2019-12-09 ASSESSMENT — MIFFLIN-ST. JEOR: SCORE: 1335.79

## 2019-12-09 ASSESSMENT — PAIN SCALES - GENERAL: PAINLEVEL: MILD PAIN (2)

## 2019-12-09 NOTE — PROGRESS NOTES
"Subjective     Deysi Douglas is a 44 year old female who presents to clinic today for the following health issues:    HPI   ED/UC Followup:    Facility:  [facility]  Date of visit: 12/02/219  Reason for visit: Fall due to slipping on ice or snow - Traumatic injury of head  Current Status: having very bad vertigo.        45 y/o female here for 1 week f/u from recent slip and fall head injury.  She was evaluated and worked up at ED, with negative head CT.  She was actually quite surprised on how well she felt the next couple of days.  Staring on Friday night, started to get some vertigo symptoms.  Bad on both sat and sun am, and getting better through the day.  This am, symptoms are only very mild.  She has had vertigo in the past, and symptoms are consistent with then.    BP Readings from Last 3 Encounters:   12/09/19 108/71   08/09/18 105/67   05/04/18 110/60    Wt Readings from Last 3 Encounters:   12/09/19 72.7 kg (160 lb 3.2 oz)   08/09/18 61.2 kg (134 lb 14.4 oz)   05/04/18 63.3 kg (139 lb 8 oz)                      Reviewed and updated as needed this visit by Provider         Review of Systems   ROS COMP: Constitutional, HEENT, cardiovascular, pulmonary, gi and gu systems are negative, except as otherwise noted.      Objective    /71 (BP Location: Right arm, Patient Position: Sitting, Cuff Size: Adult Regular)   Pulse 65   Resp 14   Ht 1.6 m (5' 3\")   Wt 72.7 kg (160 lb 3.2 oz)   LMP 12/07/2019 (Exact Date)   SpO2 100%   BMI 28.38 kg/m    Body mass index is 28.38 kg/m .  Physical Exam   GENERAL: alert and no distress  EYES: Eyes grossly normal to inspection, PERRL and EOMI  RESP: lungs clear to auscultation - no rales, rhonchi or wheezes  CV: regular rate and rhythm, normal S1 S2, no S3 or S4, no murmur, click or rub, no peripheral edema and peripheral pulses strong  NEURO: Normal strength and tone, mentation intact and speech normal    Diagnostic Test Results:  Labs reviewed " "in Epic        Assessment & Plan     1. Vertigo  Hard to say if this is consequence of head injury or unrelated.  Does seem to be improving.  Will start some at home [place], and if symptoms persist or worsen will have her follow up with concussion/PT clinic.  - CONCUSSION  REFERRAL    2. Concussion without loss of consciousness, subsequent encounter    - CONCUSSION  REFERRAL     BMI:   Estimated body mass index is 28.38 kg/m  as calculated from the following:    Height as of this encounter: 1.6 m (5' 3\").    Weight as of this encounter: 72.7 kg (160 lb 3.2 oz).               Return in about 3 months (around 3/9/2020).    Gary Sands PA-C  [facility]      "

## 2019-12-15 ENCOUNTER — HEALTH MAINTENANCE LETTER (OUTPATIENT)
Age: 44
End: 2019-12-15

## 2019-12-19 ENCOUNTER — HOSPITAL ENCOUNTER (EMERGENCY)
Facility: CLINIC | Age: 44
Discharge: HOME | End: 2019-12-19
Attending: EMERGENCY MEDICINE | Admitting: EMERGENCY MEDICINE
Payer: COMMERCIAL

## 2019-12-19 VITALS
RESPIRATION RATE: 16 BRPM | TEMPERATURE: 98.1 F | SYSTOLIC BLOOD PRESSURE: 119 MMHG | OXYGEN SATURATION: 100 % | DIASTOLIC BLOOD PRESSURE: 76 MMHG | HEART RATE: 65 BPM

## 2019-12-19 DIAGNOSIS — M62.830 SPASM OF BACK MUSCLES: ICD-10-CM

## 2019-12-19 PROCEDURE — 99282 EMERGENCY DEPT VISIT SF MDM: CPT

## 2019-12-19 RX ORDER — CYCLOBENZAPRINE HCL 10 MG
5-10 TABLET ORAL 3 TIMES DAILY PRN
Qty: 12 TABLET | Refills: 0 | Status: SHIPPED | OUTPATIENT
Start: 2019-12-19 | End: 2019-12-23 | Stop reason: ALTCHOICE

## 2019-12-19 RX ORDER — LIDOCAINE 50 MG/G
1-2 PATCH TOPICAL EVERY 12 HOURS PRN
Qty: 12 PATCH | Refills: 0 | Status: SHIPPED | OUTPATIENT
Start: 2019-12-19 | End: 2021-05-25

## 2019-12-19 ASSESSMENT — ENCOUNTER SYMPTOMS
BACK PAIN: 1
FEVER: 0
HEMATURIA: 0
NECK PAIN: 1
VOMITING: 0
NAUSEA: 0
DIFFICULTY URINATING: 0
ABDOMINAL PAIN: 0

## 2019-12-19 NOTE — ED AVS SNAPSHOT
Emergency Department  [address] [place] [address]  Phone:  [phone]  Fax:  [phone]                                    Deysi Douglas   MRN: [id number]    Department:   Emergency Department   Date of Visit:  12/19/2019           After Visit Summary Signature Page    I have received my discharge instructions, and my questions have been answered. I have discussed any challenges I see with this plan with the nurse or doctor.    ..........................................................................................................................................  Patient/Patient Representative Signature      ..........................................................................................................................................  Patient Representative Print Name and Relationship to Patient    ..................................................               ................................................  Date                                   Time    ..........................................................................................................................................  Reviewed by Signature/Title    ...................................................              ..............................................  Date                                               Time          22EPIC Rev 08/18

## 2019-12-20 NOTE — ED NOTES
Bed: ED06  Expected date: 12/19/19  Expected time: 8:40 PM  Means of arrival: Ambulance  Comments:  HEMS [phone]  Back spasms

## 2019-12-20 NOTE — ED PROVIDER NOTES
History     Chief Complaint:  Back Pain      HPI   Deysi Douglas is a 44 year old female who presents via EMS with back pain. The patient says that she slipped on the ice and hit her head a few weeks ago. She had a CT done, imaging below. She says that she had been having neck pain since then, but the pain has worsened recently. The patient says that today she went to the gym for a light workout and then went to her chiropractor and had her neck adjusted. Around 1900 the patient say that she took an ibuprofen and went out for dinner. She says that while she was at dinner she had a sudden spasm in her middle back that radiated to the front. She says that it felt tight and it was hard to take a breath. She says that she tried laying on her back, but then she was unable to breath at all. She says that it felt like back spasms, and she rated the pain at a 10/10. She denies any fever, abdominal pain, leg pain or swelling, nausea or vomiting, difficulty urinating or hematuria, or any history of blood clots.     CT head brain, [facility], 2019  No acute intracranial abnormalities.    Allergies:  Shellfish allergy  Peanuts  Phenylephrine      Medications:    Lexapro  Synthroid   Valtrex  Zyrtec      Past Medical History:    Hypothyroidism  Septate uterus  MARCELO  Binge-eating disorder  Mild major depression     Past Surgical History:     section  D & C  EGD     Family History:    Thyroid disease  Osteoporosis   Depression  CAD  Lipids  Substance abuse    Social History:  Smoking Status: Never Smoker  Smokeless Tobacco: Never Used  Alcohol Use: Negative   Drug Use: Negative  PCP: Payton Horn   Marital Status:        Review of Systems   Constitutional: Negative for fever.   Gastrointestinal: Negative for abdominal pain, nausea and vomiting.   Genitourinary: Negative for difficulty urinating and hematuria.   Musculoskeletal: Positive for back pain and neck pain.   All other systems reviewed and are  negative.    Physical Exam     Patient Vitals for the past 24 hrs:   BP Temp Temp src Pulse Resp SpO2   12/19/19 2049 119/76 98.1  F (36.7  C) Oral 65 16 100 %        Physical Exam    General: Sitting up in bed and appears comfortable  Eyes:  The pupils are equal and round    Conjunctivae and sclerae are normal  ENT:    Moist mucous membranes  Neck:  Normal range of motion  CV:  Regular rate and rhythm    Skin warm and well perfused   Resp:  Lungs are clear    Non-labored    No rales    No wheezing   GI:  Abdomen is soft, there is no rigidity    No distension    No rebound tenderness     No abdominal tenderness  MS:  No midline cervical, thoracic and lumbar spine tenderness. Mild tenderness on right upper back over musculature and mild tenderness on right lower back over musculature  Skin:  No rash or acute skin lesions noted  Neuro:   Awake, alert.      Speech is normal and fluent.    Face is symmetric.     Moves all extremities equally  Psych: Normal affect.  Appropriate interactions.     Emergency Department Course     Emergency Department Course:    2109 Nursing notes and vitals reviewed.    2150 I performed an exam of the patient as documented above.     2220 Patient rechecked and updated.       The patient is discharged to home.     Impression & Plan      Medical Decision Making:  Deysi Douglas is a 44 year old female who presents to the emergency department today for evaluation of back pain. Looks well in ED and appears comfortable. Back spasms have stopped. The spasm was on mid back that spread. No neurologic findings. Doubt fracture, dissection, PE, ACS, stone, intra-abdominal process, etc. Ambulated well in ED and still appeared comfortable. No midline tenderness to suggest fracture. No indication for imaging of spine. Recommended ibuprofen, tylenol, muscle relaxers, lidocaine patches. Pt feels comfortable with that plan.     Diagnosis:    ICD-10-CM    1. Spasm of back muscles M62.830      Disposition:    Findings and plan explained to the Patient. Patient discharged home with instructions regarding supportive care, medications, and reasons to return. The importance of close follow-up was reviewed.    Discharge Medications:  New Prescriptions    CYCLOBENZAPRINE (FLEXERIL) 10 MG TABLET    Take 0.5-1 tablets (5-10 mg) by mouth 3 times daily as needed for muscle spasms    LIDOCAINE (LIDODERM) 5 % PATCH    Place 1-2 patches onto the skin every 12 hours as needed for moderate pain To prevent lidocaine toxicity, patient should be patch free for 12 hrs daily.       Scribe Disclosure:  I, Haroon Jiménez, am serving as a scribe at 9:13 PM on 12/19/2019 to document services personally performed by Elaina Levy MD based on my observations and the provider's statements to me.        EMERGENCY DEPARTMENT       Elaina Levy MD  12/20/19 0215

## 2019-12-20 NOTE — DISCHARGE INSTRUCTIONS
Lidocaine patches 12 hours on and 12 hours off   Flexeril for muscle spasms  Ibuprofen and tylenol for pain as needed    Discharge Instructions  Back Pain  You were seen today for back pain. Back pain can have many causes, but most will get better without surgery or other specific treatment. Sometimes there is a herniated ( slipped ) disc. We do not usually do MRI scans to look for these right away, since most herniated discs will get better on their own with time.  Today, we did not find any evidence that your back pain was caused by a serious condition. However, sometimes symptoms develop over time and cannot be found during an emergency visit, so it is very important that you follow up with your primary provider.  Generally, every Emergency Department visit should have a follow-up clinic visit with either a primary or a specialty clinic/provider. Please follow-up as instructed by your emergency provider today.    Return to the Emergency Department if:  You develop a fever with your back pain.   You have weakness or change in sensation in one or both legs.  You lose control of your bowels or bladder, or cannot empty your bladder (cannot pee).  Your pain gets much worse.     Follow-up with your provider:  Unless your pain has completely gone away, please make an appointment with your provider within one week. Most of the routine care for back pain is available in a clinic and not the Emergency Department. You may need further management of your back pain, such as more pain medication, imaging such as an X-ray or MRI, or physical therapy.    What can I do to help myself?  Remain Active -- People are often afraid that they will hurt their back further or delay recovery by remaining active, but this is one of the best things you can do for your back. In fact, staying in bed for a long time to rest is not recommended. Studies have shown that people with low back pain recover faster when they remain active. Movement  helps to bring blood flow to the muscles and relieve muscle spasms as well as preventing loss of muscle strength.  Heat -- Using a heating pad can help with low back pain during the first few weeks. Do not sleep with a heating pad, as you can be burned.   Pain medications - You may take a pain medication such as Tylenol  (acetaminophen), Advil , Motrin  (ibuprofen) or Aleve  (naproxen).  If you were given a prescription for medicine here today, be sure to read all of the information (including the package insert) that comes with your prescription.  This will include important information about the medicine, its side effects, and any warnings that you need to know about.  The pharmacist who fills the prescription can provide more information and answer questions you may have about the medicine.  If you have questions or concerns that the pharmacist cannot address, please call or return to the Emergency Department.   Remember that you can always come back to the Emergency Department if you are not able to see your regular provider in the amount of time listed above, if you get any new symptoms, or if there is anything that worries you.

## 2019-12-20 NOTE — ED TRIAGE NOTES
"Pt here from restaurant by EMS for back pain. Pt states she slipped and fell on the ice a few weeks ago and since then has been having neck pain. She goes on to state that she has been seeing a chiropractor for her neck pain and had an \"adjustment\" today. She was sitting having dinner tonight when she had an acute onset of mid back spasms that radiated to her R flank. On arrival pt states the spasms are not as bad as initially and that they come in \"waves\". Pt states she took ibuprofen around 7:30pm. Pt Aox4. CMS intact. No loss of bowel or bladder. Skin pink warm and dry.   "

## 2019-12-23 ENCOUNTER — MYC MEDICAL ADVICE (OUTPATIENT)
Dept: FAMILY MEDICINE | Facility: CLINIC | Age: 44
End: 2019-12-23

## 2019-12-23 ENCOUNTER — OFFICE VISIT (OUTPATIENT)
Dept: FAMILY MEDICINE | Facility: CLINIC | Age: 44
End: 2019-12-23
Payer: COMMERCIAL

## 2019-12-23 ENCOUNTER — TELEPHONE (OUTPATIENT)
Dept: FAMILY MEDICINE | Facility: CLINIC | Age: 44
End: 2019-12-23

## 2019-12-23 VITALS
DIASTOLIC BLOOD PRESSURE: 64 MMHG | WEIGHT: 158.4 LBS | TEMPERATURE: 98.2 F | SYSTOLIC BLOOD PRESSURE: 94 MMHG | OXYGEN SATURATION: 97 % | BODY MASS INDEX: 28.06 KG/M2 | HEART RATE: 82 BPM | RESPIRATION RATE: 16 BRPM

## 2019-12-23 DIAGNOSIS — M62.830 SPASM OF BACK MUSCLES: Primary | ICD-10-CM

## 2019-12-23 PROCEDURE — 99213 OFFICE O/P EST LOW 20 MIN: CPT | Performed by: PHYSICIAN ASSISTANT

## 2019-12-23 RX ORDER — METHOCARBAMOL 500 MG/1
500 TABLET, FILM COATED ORAL 4 TIMES DAILY PRN
Qty: 40 TABLET | Refills: 0 | Status: SHIPPED | OUTPATIENT
Start: 2019-12-23 | End: 2021-05-25

## 2019-12-23 NOTE — TELEPHONE ENCOUNTER
Reason for Call:  Medication or medication refill:    Do you use a [facility]?  Name of the pharmacy and phone number for the current request:  [facility][id number] [address] AT [facility]    Name of the medication requested: Robaxin    Other request: Pt had an OV today 12.23.19 and Rx was not sent. Please send Rx before Pt leaves [facility]    Can we leave a detailed message on this number? YES    Phone number patient can be reached at: Cell number on file:    Telephone Information:   Mobile [phone]     Best Time: any    Call taken on 12/23/2019 at 4:25 PM by Annie Pantoja

## 2019-12-23 NOTE — PROGRESS NOTES
Subjective     Deysi Douglas is a 44 year old female who presents to clinic today for the following health issues:    HPI   Musculoskeletal problem/pain      Duration: 12/19/19 Thursday    Description  Location: middle right side    Intensity:  6/10    Accompanying signs and symptoms: radiation of pain to front of body    History  Previous similar problem: no   Previous evaluation:  none    Precipitating or alleviating factors:  Trauma or overuse: YES- patient had a fall. 12/02/19   Aggravating factors include: sitting, standing, walking and patient says it comes back when medications wear off    Therapies tried and outcome: cyclobenzaprine gave relief.         BP Readings from Last 3 Encounters:   12/23/19 94/64   12/19/19 119/76   12/09/19 108/71    Wt Readings from Last 3 Encounters:   12/23/19 71.8 kg (158 lb 6.4 oz)   12/09/19 72.7 kg (160 lb 3.2 oz)   08/09/18 61.2 kg (134 lb 14.4 oz)                    Reviewed and updated as needed this visit by Provider         Review of Systems   ROS COMP: Constitutional, HEENT, cardiovascular, pulmonary, gi and gu systems are negative, except as otherwise noted.      Objective    BP 94/64   Pulse 82   Temp 98.2  F (36.8  C)   Resp 16   Wt 71.8 kg (158 lb 6.4 oz)   LMP 12/07/2019 (Exact Date)   SpO2 97%   BMI 28.06 kg/m    Body mass index is 28.06 kg/m .  Physical Exam   GENERAL: alert and no distress  EYES: Eyes grossly normal to inspection  RESP: lungs clear to auscultation - no rales, rhonchi or wheezes  CV: regular rate and rhythm, normal S1 S2, no S3 or S4, no murmur, click or rub, no peripheral edema and peripheral pulses strong  MS: some tenderness over paraspinal muscles  PSYCH: mentation appears normal, affect normal/bright    Diagnostic Test Results:  Labs reviewed in Epic        Assessment & Plan     1. Spasm of back muscles  Since flexeril worked pretty well, but was too sedation, will trial robaxin.  If symptoms persist or worsen, will get her into  "PHYSICAL THERAPY.  - methocarbamol (ROBAXIN) 500 MG tablet; Take 1 tablet (500 mg) by mouth 4 times daily as needed for muscle spasms  Dispense: 40 tablet; Refill: 0     BMI:   Estimated body mass index is 28.06 kg/m  as calculated from the following:    Height as of 12/9/19: 1.6 m (5' 3\").    Weight as of this encounter: 71.8 kg (158 lb 6.4 oz).               Return in about 2 weeks (around 1/6/2020) for If symptoms persist or worsen.    Gary Sands PA-C  [facility]      "

## 2019-12-23 NOTE — NURSING NOTE
"No chief complaint on file.    BP 94/64   Pulse 82   Temp 98.2  F (36.8  C)   Resp 16   Wt 71.8 kg (158 lb 6.4 oz)   LMP 12/07/2019 (Exact Date)   SpO2 97%   BMI 28.06 kg/m   Estimated body mass index is 28.06 kg/m  as calculated from the following:    Height as of 12/9/19: 1.6 m (5' 3\").    Weight as of this encounter: 71.8 kg (158 lb 6.4 oz).  bp completed using cuff size: regular      Health Maintenance addressed:  PHQ9 and GAD7    Pt will wait to complete today    Pricila Hopkins MA    "

## 2019-12-30 ENCOUNTER — AMBULATORY - HEALTHEAST (OUTPATIENT)
Dept: NEUROLOGY | Facility: CLINIC | Age: 44
End: 2019-12-30

## 2019-12-30 DIAGNOSIS — S06.0XAA CONCUSSION: ICD-10-CM

## 2020-01-14 ENCOUNTER — HOSPITAL ENCOUNTER (OUTPATIENT)
Dept: NEUROLOGY | Facility: CLINIC | Age: 45
Setting detail: THERAPIES SERIES
Discharge: STILL ADMITTED OR ADMINISTRATIVE | End: 2020-01-14
Attending: NURSE PRACTITIONER

## 2020-01-14 DIAGNOSIS — R41.840 ATTENTION AND CONCENTRATION DEFICIT: ICD-10-CM

## 2020-01-14 DIAGNOSIS — G47.00 INSOMNIA, UNSPECIFIED TYPE: ICD-10-CM

## 2020-01-14 DIAGNOSIS — M54.2 NECK PAIN: ICD-10-CM

## 2020-01-14 DIAGNOSIS — R53.83 FATIGUE, UNSPECIFIED TYPE: ICD-10-CM

## 2020-01-14 DIAGNOSIS — Z87.820 HISTORY OF CONCUSSION: ICD-10-CM

## 2020-01-14 DIAGNOSIS — F07.81 POST CONCUSSION SYNDROME: ICD-10-CM

## 2020-01-14 ASSESSMENT — MIFFLIN-ST. JEOR: SCORE: 1302.21

## 2020-01-24 ENCOUNTER — HOSPITAL ENCOUNTER (OUTPATIENT)
Dept: PHYSICAL THERAPY | Age: 45
Setting detail: THERAPIES SERIES
Discharge: STILL ADMITTED OR ADMINISTRATIVE | End: 2020-01-24
Attending: NURSE PRACTITIONER

## 2020-01-24 DIAGNOSIS — R29.3 POOR POSTURE: ICD-10-CM

## 2020-01-24 DIAGNOSIS — G44.209 TENSION HEADACHE: ICD-10-CM

## 2020-01-24 DIAGNOSIS — M54.2 NECK PAIN: ICD-10-CM

## 2020-01-24 DIAGNOSIS — F07.81 POST CONCUSSION SYNDROME: ICD-10-CM

## 2020-01-30 ENCOUNTER — TELEPHONE (OUTPATIENT)
Dept: FAMILY MEDICINE | Facility: CLINIC | Age: 45
End: 2020-01-30

## 2020-01-30 DIAGNOSIS — Z20.828 EXPOSURE TO THE FLU: Primary | ICD-10-CM

## 2020-01-30 RX ORDER — OSELTAMIVIR PHOSPHATE 75 MG/1
75 CAPSULE ORAL DAILY
Qty: 7 CAPSULE | Refills: 0 | Status: SHIPPED | OUTPATIENT
Start: 2020-01-30 | End: 2020-02-06

## 2020-01-30 NOTE — TELEPHONE ENCOUNTER
Per malou with  pt was exposed to her daughter who has positive Flu B test 1-30-20  Please send in tamiflu

## 2020-01-31 ENCOUNTER — TELEPHONE (OUTPATIENT)
Dept: FAMILY MEDICINE | Facility: CLINIC | Age: 45
End: 2020-01-31

## 2020-01-31 NOTE — TELEPHONE ENCOUNTER
"Dr. Payton Horn signed order for PT for an \"Therapy Initial Plan of Care\"--there was no cover sheet; therefore, unsure where to fax form. Called patient and she gave a phone number [phone] ( [facility])--left message for nurses  to call back to see if forms were from them.     ALFREDO Stiles     "

## 2020-02-04 ENCOUNTER — HOSPITAL ENCOUNTER (OUTPATIENT)
Dept: NEUROLOGY | Facility: CLINIC | Age: 45
Setting detail: THERAPIES SERIES
Discharge: STILL ADMITTED OR ADMINISTRATIVE | End: 2020-02-04
Attending: NURSE PRACTITIONER

## 2020-02-04 DIAGNOSIS — F33.0 MAJOR DEPRESSIVE DISORDER, RECURRENT EPISODE, MILD (H): ICD-10-CM

## 2020-02-04 DIAGNOSIS — F07.81 POST CONCUSSION SYNDROME: ICD-10-CM

## 2020-02-04 DIAGNOSIS — F41.1 GENERALIZED ANXIETY DISORDER: ICD-10-CM

## 2020-02-04 NOTE — TELEPHONE ENCOUNTER
Therapy Initial Plan of care faxed to Gonsalo Fields, PT at [phone].( [facility] )Aislinn Tran, NA/R

## 2020-02-07 ENCOUNTER — HOSPITAL ENCOUNTER (OUTPATIENT)
Dept: PHYSICAL THERAPY | Age: 45
Setting detail: THERAPIES SERIES
Discharge: STILL ADMITTED OR ADMINISTRATIVE | End: 2020-02-07
Attending: PHYSICAL THERAPIST

## 2020-02-07 DIAGNOSIS — G44.209 TENSION HEADACHE: ICD-10-CM

## 2020-02-07 DIAGNOSIS — M54.2 NECK PAIN: ICD-10-CM

## 2020-02-07 DIAGNOSIS — R29.3 POOR POSTURE: ICD-10-CM

## 2020-02-13 ENCOUNTER — HOSPITAL ENCOUNTER (OUTPATIENT)
Dept: PHYSICAL THERAPY | Age: 45
Setting detail: THERAPIES SERIES
Discharge: STILL ADMITTED OR ADMINISTRATIVE | End: 2020-02-13
Attending: NURSE PRACTITIONER

## 2020-02-13 DIAGNOSIS — M54.2 NECK PAIN: ICD-10-CM

## 2020-02-13 DIAGNOSIS — R29.3 POOR POSTURE: ICD-10-CM

## 2020-02-24 ENCOUNTER — HOSPITAL ENCOUNTER (OUTPATIENT)
Dept: NEUROLOGY | Facility: CLINIC | Age: 45
Setting detail: THERAPIES SERIES
Discharge: STILL ADMITTED OR ADMINISTRATIVE | End: 2020-02-24
Attending: NURSE PRACTITIONER

## 2020-02-24 DIAGNOSIS — R45.4 IRRITABILITY: ICD-10-CM

## 2020-02-24 DIAGNOSIS — R42 DIZZINESS: ICD-10-CM

## 2020-02-24 DIAGNOSIS — F07.81 POST CONCUSSION SYNDROME: ICD-10-CM

## 2020-02-24 DIAGNOSIS — G44.309 POST-CONCUSSION HEADACHE: ICD-10-CM

## 2020-02-24 DIAGNOSIS — F06.4 ANXIETY DISORDER DUE TO MEDICAL CONDITION: ICD-10-CM

## 2020-02-24 DIAGNOSIS — G47.00 INSOMNIA, UNSPECIFIED TYPE: ICD-10-CM

## 2020-10-19 ENCOUNTER — OFFICE VISIT (OUTPATIENT)
Dept: FAMILY MEDICINE | Facility: CLINIC | Age: 45
End: 2020-10-19
Payer: COMMERCIAL

## 2020-10-19 VITALS
WEIGHT: 171.6 LBS | HEIGHT: 63 IN | HEART RATE: 78 BPM | BODY MASS INDEX: 30.41 KG/M2 | RESPIRATION RATE: 20 BRPM | SYSTOLIC BLOOD PRESSURE: 114 MMHG | TEMPERATURE: 98 F | OXYGEN SATURATION: 98 % | DIASTOLIC BLOOD PRESSURE: 77 MMHG

## 2020-10-19 DIAGNOSIS — E06.3 HYPOTHYROIDISM DUE TO HASHIMOTO'S THYROIDITIS: ICD-10-CM

## 2020-10-19 DIAGNOSIS — R42 DIZZINESS: ICD-10-CM

## 2020-10-19 DIAGNOSIS — F32.0 MILD MAJOR DEPRESSION (H): ICD-10-CM

## 2020-10-19 DIAGNOSIS — Z23 NEED FOR VACCINATION: ICD-10-CM

## 2020-10-19 DIAGNOSIS — Z00.00 ROUTINE GENERAL MEDICAL EXAMINATION AT A HEALTH CARE FACILITY: Primary | ICD-10-CM

## 2020-10-19 LAB
T3FREE SERPL-MCNC: 2 PG/ML (ref 2.3–4.2)
VIT B12 SERPL-MCNC: 851 PG/ML (ref 193–986)

## 2020-10-19 PROCEDURE — 84481 FREE ASSAY (FT-3): CPT | Performed by: PHYSICIAN ASSISTANT

## 2020-10-19 PROCEDURE — 90471 IMMUNIZATION ADMIN: CPT | Performed by: PHYSICIAN ASSISTANT

## 2020-10-19 PROCEDURE — 84630 ASSAY OF ZINC: CPT | Mod: 90 | Performed by: PHYSICIAN ASSISTANT

## 2020-10-19 PROCEDURE — 84443 ASSAY THYROID STIM HORMONE: CPT | Performed by: PHYSICIAN ASSISTANT

## 2020-10-19 PROCEDURE — 99396 PREV VISIT EST AGE 40-64: CPT | Mod: 25 | Performed by: PHYSICIAN ASSISTANT

## 2020-10-19 PROCEDURE — 82525 ASSAY OF COPPER: CPT | Mod: 90 | Performed by: PHYSICIAN ASSISTANT

## 2020-10-19 PROCEDURE — 36415 COLL VENOUS BLD VENIPUNCTURE: CPT | Performed by: PHYSICIAN ASSISTANT

## 2020-10-19 PROCEDURE — 82607 VITAMIN B-12: CPT | Performed by: PHYSICIAN ASSISTANT

## 2020-10-19 PROCEDURE — 99000 SPECIMEN HANDLING OFFICE-LAB: CPT | Performed by: PHYSICIAN ASSISTANT

## 2020-10-19 PROCEDURE — 80053 COMPREHEN METABOLIC PANEL: CPT | Performed by: PHYSICIAN ASSISTANT

## 2020-10-19 PROCEDURE — 82306 VITAMIN D 25 HYDROXY: CPT | Performed by: PHYSICIAN ASSISTANT

## 2020-10-19 PROCEDURE — 84207 ASSAY OF VITAMIN B-6: CPT | Mod: 90 | Performed by: PHYSICIAN ASSISTANT

## 2020-10-19 PROCEDURE — 90686 IIV4 VACC NO PRSV 0.5 ML IM: CPT | Performed by: PHYSICIAN ASSISTANT

## 2020-10-19 PROCEDURE — 84439 ASSAY OF FREE THYROXINE: CPT | Performed by: PHYSICIAN ASSISTANT

## 2020-10-19 RX ORDER — OLANZAPINE 2.5 MG/1
0.5 TABLET, FILM COATED ORAL AT BEDTIME
COMMUNITY
End: 2022-01-25

## 2020-10-19 ASSESSMENT — ANXIETY QUESTIONNAIRES
2. NOT BEING ABLE TO STOP OR CONTROL WORRYING: NOT AT ALL
5. BEING SO RESTLESS THAT IT IS HARD TO SIT STILL: NOT AT ALL
6. BECOMING EASILY ANNOYED OR IRRITABLE: NOT AT ALL
7. FEELING AFRAID AS IF SOMETHING AWFUL MIGHT HAPPEN: NOT AT ALL
GAD7 TOTAL SCORE: 0
1. FEELING NERVOUS, ANXIOUS, OR ON EDGE: NOT AT ALL
3. WORRYING TOO MUCH ABOUT DIFFERENT THINGS: NOT AT ALL

## 2020-10-19 ASSESSMENT — MIFFLIN-ST. JEOR: SCORE: 1382.5

## 2020-10-19 ASSESSMENT — PATIENT HEALTH QUESTIONNAIRE - PHQ9
5. POOR APPETITE OR OVEREATING: NOT AT ALL
SUM OF ALL RESPONSES TO PHQ QUESTIONS 1-9: 1

## 2020-10-19 NOTE — NURSING NOTE
Prior to immunization administration, verified patients identity using patient s name and date of birth. Please see Immunization Activity for additional information.     Screening Questionnaire for Adult Immunization    Are you sick today?   No   Do you have allergies to medications, food, a vaccine component or latex?   No   Have you ever had a serious reaction after receiving a vaccination?   No   Do you have a long-term health problem with heart, lung, kidney, or metabolic disease (e.g., diabetes), asthma, a blood disorder, no spleen, complement component deficiency, a cochlear implant, or a spinal fluid leak?  Are you on long-term aspirin therapy?   No   Do you have cancer, leukemia, HIV/AIDS, or any other immune system problem?   No   Do you have a parent, brother, or sister with an immune system problem?   No   In the past 3 months, have you taken medications that affect  your immune system, such as prednisone, other steroids, or anticancer drugs; drugs for the treatment of rheumatoid arthritis, Crohn s disease, or psoriasis; or have you had radiation treatments?   No   Have you had a seizure, or a brain or other nervous system problem?   No   During the past year, have you received a transfusion of blood or blood    products, or been given immune (gamma) globulin or antiviral drug?   No   For women: Are you pregnant or is there a chance you could become       pregnant during the next month?   No   Have you received any vaccinations in the past 4 weeks?   No     Immunization questionnaire answers were all negative.        Per orders of Gary Sands PA-C, injection of Fluzone given by Pamela Zuniga MA. Patient instructed to remain in clinic for 15 minutes afterwards, and to report any adverse reaction to me immediately.       Screening performed by Pamela Zuniga MA on 10/19/2020 at 12:23 PM.  aPmela Zuniga MA on 10/19/2020 at 12:23 PM

## 2020-10-19 NOTE — PROGRESS NOTES
SUBJECTIVE:   CC: Deysi Douglas is an 45 year old woman who presents for preventive health visit.     Patient has been advised of split billing requirements and indicates understanding: Yes  Healthy Habits:     Getting at least 3 servings of Calcium per day:  NO    Bi-annual eye exam:  Yes    Dental care twice a year:  Yes    Sleep apnea or symptoms of sleep apnea:  None    Diet:  Gluten-free/reduced    Frequency of exercise:  2-3 days/week    Duration of exercise:  30-45 minutes    Taking medications regularly:  Yes    Medication side effects:  None    PHQ-2 Total Score: 0    Additional concerns today:  Yes    Pt has been having increased dizziness for a few months, would like thyroid, iron, vitamin D, zinc, vitamin B levels checked.    46 y/o female here for well exam.  She also has been working with a functional nutritionist for some mild dizzy symptoms.  She does request some labs to be done.      She is due for pap/pelvic, does follow with GYN for that, and plans to get scheduled.      She continue to follow with Dr. Slaughter for mental health, feels pretty stable on current meds.  lexapro and zyprexa at night.            Hypothyroidism Follow-up      Since last visit, patient describes the following symptoms: Weight stable, no hair loss, no skin changes, no constipation, no loose stools      Today's PHQ-2 Score:   PHQ-2 ( 1999 Pfizer) 10/19/2020   Q1: Little interest or pleasure in doing things 0   Q2: Feeling down, depressed or hopeless 0   PHQ-2 Score 0   Q1: Little interest or pleasure in doing things Not at all   Q2: Feeling down, depressed or hopeless Not at all   PHQ-2 Score 0       Abuse: Current or Past (Physical, Sexual or Emotional) - No  Do you feel safe in your environment? Yes        Social History     Tobacco Use     Smoking status: Never Smoker     Smokeless tobacco: Never Used   Substance Use Topics     Alcohol use: No     Alcohol/week: 0.0 standard drinks     If you drink alcohol do you  typically have >3 drinks per day or >7 drinks per week? No    Alcohol Use 10/19/2020   Prescreen: >3 drinks/day or >7 drinks/week? Not Applicable   No flowsheet data found.    Reviewed orders with patient.  Reviewed health maintenance and updated orders accordingly - Yes  BP Readings from Last 3 Encounters:   10/19/20 114/77   12/23/19 94/64   12/19/19 119/76    Wt Readings from Last 3 Encounters:   10/19/20 77.8 kg (171 lb 9.6 oz)   12/23/19 71.8 kg (158 lb 6.4 oz)   12/09/19 72.7 kg (160 lb 3.2 oz)                    Mammogram Screening: Patient under age 50, mutual decision reflected in health maintenance.      Pertinent mammograms are reviewed under the imaging tab.  History of abnormal Pap smear: NO - age 30-65 PAP every 5 years with negative HPV co-testing recommended  PAP / HPV 2/11/2014 2/8/2012 5/28/2010   PAP NIL NIL NIL     Reviewed and updated as needed this visit by clinical staff  Tobacco  Allergies  Meds   Med Hx  Surg Hx  Fam Hx  Soc Hx        Reviewed and updated as needed this visit by Provider                    Review of Systems  CONSTITUTIONAL: NEGATIVE for fever, chills, change in weight  INTEGUMENTARU/SKIN: NEGATIVE for worrisome rashes, moles or lesions  EYES: NEGATIVE for vision changes or irritation  ENT: NEGATIVE for ear, mouth and throat problems  RESP: NEGATIVE for significant cough or SOB  BREAST: NEGATIVE for masses, tenderness or discharge  CV: NEGATIVE for chest pain, palpitations or peripheral edema  GI: NEGATIVE for nausea, abdominal pain, heartburn, or change in bowel habits  : NEGATIVE for unusual urinary or vaginal symptoms. Periods are regular.  MUSCULOSKELETAL: NEGATIVE for significant arthralgias or myalgia  NEURO: NEGATIVE for weakness, dizziness or paresthesias  PSYCHIATRIC: NEGATIVE for changes in mood or affect     OBJECTIVE:   /77 (Patient Position: Sitting, Cuff Size: Adult Regular)   Pulse 78   Temp 98  F (36.7  C) (Tympanic)   Resp 20   Ht 1.6 m (5'  "3\")   Wt 77.8 kg (171 lb 9.6 oz)   LMP 10/18/2020 (Exact Date)   SpO2 98%   BMI 30.40 kg/m    Physical Exam  GENERAL: alert and no distress  EYES: Eyes grossly normal to inspection, PERRL and conjunctivae and sclerae normal  HENT: ear canals and TM's normal, nose and mouth without ulcers or lesions  NECK: no adenopathy, no asymmetry, masses, or scars and thyroid normal to palpation  RESP: lungs clear to auscultation - no rales, rhonchi or wheezes  CV: regular rate and rhythm, normal S1 S2, no S3 or S4, no murmur, click or rub, no peripheral edema and peripheral pulses strong  MS: no gross musculoskeletal defects noted, no edema  SKIN: no suspicious lesions or rashes  NEURO: Normal strength and tone, mentation intact and speech normal  PSYCH: mentation appears normal, affect normal/bright    Diagnostic Test Results:  Labs reviewed in Epic    ASSESSMENT/PLAN:   1. Routine general medical examination at a health care facility    - Vitamin D Deficiency  - Zinc  - Copper level  - Comprehensive metabolic panel  - Vitamin B12  - Vitamin B6  - TSH    2. Dizziness  She has had some success with at home Epley, will start that back up.  Will get labs based on recommendation from another health care provider.  - Vitamin D Deficiency  - Zinc  - Copper level  - Comprehensive metabolic panel  - Vitamin B12  - Vitamin B6    3. Hypothyroidism due to Hashimoto's thyroiditis  Will await labs and refill based on results.  - T4, free  - T3, Free    4. Mild major depression (H)  Stable, follows with speciality.      5. Need for vaccination    - INFLUENZA VACCINE IM > 6 MONTHS VALENT IIV4 [05028]  - ADMIN 1st VACCINE    Patient has been advised of split billing requirements and indicates understanding: Yes  COUNSELING:  Reviewed preventive health counseling, as reflected in patient instructions       Regular exercise       Healthy diet/nutrition       Immunizations    Vaccinated for: Influenza          Estimated body mass index is " "30.4 kg/m  as calculated from the following:    Height as of this encounter: 1.6 m (5' 3\").    Weight as of this encounter: 77.8 kg (171 lb 9.6 oz).    Weight management plan: Discussed healthy diet and exercise guidelines    She reports that she has never smoked. She has never used smokeless tobacco.      Counseling Resources:  ATP IV Guidelines  Pooled Cohorts Equation Calculator  Breast Cancer Risk Calculator  BRCA-Related Cancer Risk Assessment: FHS-7 Tool  FRAX Risk Assessment  ICSI Preventive Guidelines  Dietary Guidelines for Americans, 2010  USDA's MyPlate  ASA Prophylaxis  Lung CA Screening    Gary Sands PA-C  [facility]  "

## 2020-10-20 LAB
ALBUMIN SERPL-MCNC: 3.4 G/DL (ref 3.4–5)
ALP SERPL-CCNC: 59 U/L (ref 40–150)
ALT SERPL W P-5'-P-CCNC: 26 U/L (ref 0–50)
ANION GAP SERPL CALCULATED.3IONS-SCNC: 5 MMOL/L (ref 3–14)
AST SERPL W P-5'-P-CCNC: 16 U/L (ref 0–45)
BILIRUB SERPL-MCNC: 0.3 MG/DL (ref 0.2–1.3)
BUN SERPL-MCNC: 12 MG/DL (ref 7–30)
CALCIUM SERPL-MCNC: 8.5 MG/DL (ref 8.5–10.1)
CHLORIDE SERPL-SCNC: 107 MMOL/L (ref 94–109)
CO2 SERPL-SCNC: 26 MMOL/L (ref 20–32)
CREAT SERPL-MCNC: 0.82 MG/DL (ref 0.52–1.04)
DEPRECATED CALCIDIOL+CALCIFEROL SERPL-MC: 53 UG/L (ref 20–75)
GFR SERPL CREATININE-BSD FRML MDRD: 85 ML/MIN/{1.73_M2}
GLUCOSE SERPL-MCNC: 78 MG/DL (ref 70–99)
POTASSIUM SERPL-SCNC: 4.1 MMOL/L (ref 3.4–5.3)
PROT SERPL-MCNC: 7.2 G/DL (ref 6.8–8.8)
SODIUM SERPL-SCNC: 138 MMOL/L (ref 133–144)
T4 FREE SERPL-MCNC: 1.11 NG/DL (ref 0.76–1.46)
TSH SERPL DL<=0.005 MIU/L-ACNC: 4.08 MU/L (ref 0.4–4)

## 2020-10-20 ASSESSMENT — ANXIETY QUESTIONNAIRES: GAD7 TOTAL SCORE: 0

## 2020-10-21 LAB — VIT B6 SERPL-MCNC: 79 NMOL/L (ref 20–125)

## 2020-10-23 LAB
COPPER SERPL-MCNC: 123.3 UG/DL (ref 80–155)
ZINC SERPL-MCNC: 71 UG/DL (ref 60–120)

## 2020-10-26 NOTE — RESULT ENCOUNTER NOTE
Dear Deysi    Your test results are attached, feel free to contact me via [email or web address]     Here are the results of your labs so you can discuss further with your nutritionist.  Your TSH is just borderline high, T3 borderline low your T4 is ok.  If you feel good, I would not adjust, but if you are having symptoms of low thryoid, a small bump may be appropriate.      Justen Sands PA-C

## 2020-12-02 ENCOUNTER — MYC MEDICAL ADVICE (OUTPATIENT)
Dept: FAMILY MEDICINE | Facility: CLINIC | Age: 45
End: 2020-12-02

## 2020-12-02 DIAGNOSIS — E06.3 HYPOTHYROIDISM DUE TO HASHIMOTO'S THYROIDITIS: Primary | ICD-10-CM

## 2020-12-03 RX ORDER — LEVOTHYROXINE SODIUM 125 UG/1
125 TABLET ORAL DAILY
Qty: 30 TABLET | Refills: 3 | Status: SHIPPED | OUTPATIENT
Start: 2020-12-03 | End: 2021-04-02

## 2021-01-23 ENCOUNTER — HEALTH MAINTENANCE LETTER (OUTPATIENT)
Age: 46
End: 2021-01-23

## 2021-02-03 ENCOUNTER — TELEPHONE (OUTPATIENT)
Dept: FAMILY MEDICINE | Facility: CLINIC | Age: 46
End: 2021-02-03

## 2021-02-03 NOTE — TELEPHONE ENCOUNTER
Summary:    Patient is due/failing the following:   Cervical Cancer Screening    Type of outreach:  Sent [employer] message.  Action needed: Patient needs office visit for Cervical Cancer Screening.    If need for provider review:    Please indicate OV, lab, MTM, or nurse appt if needed.  Indicate fasting or not fasting.                                                                                                                                          Fausto Cole MA

## 2021-03-27 ENCOUNTER — IMMUNIZATION (OUTPATIENT)
Dept: NURSING | Facility: CLINIC | Age: 46
End: 2021-03-27
Payer: COMMERCIAL

## 2021-03-27 PROCEDURE — 91301 PR COVID VAC MODERNA 100 MCG/0.5 ML IM: CPT

## 2021-03-27 PROCEDURE — 0011A PR COVID VAC MODERNA 100 MCG/0.5 ML IM: CPT

## 2021-03-30 ENCOUNTER — MYC MEDICAL ADVICE (OUTPATIENT)
Dept: FAMILY MEDICINE | Facility: CLINIC | Age: 46
End: 2021-03-30

## 2021-03-30 DIAGNOSIS — E06.3 HYPOTHYROIDISM DUE TO HASHIMOTO'S THYROIDITIS: ICD-10-CM

## 2021-03-31 DIAGNOSIS — E06.3 HYPOTHYROIDISM DUE TO HASHIMOTO'S THYROIDITIS: ICD-10-CM

## 2021-03-31 LAB
T3FREE SERPL-MCNC: 2.4 PG/ML (ref 2.3–4.2)
T4 FREE SERPL-MCNC: 1.23 NG/DL (ref 0.76–1.46)
TSH SERPL DL<=0.005 MIU/L-ACNC: 0.55 MU/L (ref 0.4–4)

## 2021-03-31 PROCEDURE — 84439 ASSAY OF FREE THYROXINE: CPT | Performed by: PHYSICIAN ASSISTANT

## 2021-03-31 PROCEDURE — 36415 COLL VENOUS BLD VENIPUNCTURE: CPT | Performed by: PHYSICIAN ASSISTANT

## 2021-03-31 PROCEDURE — 84443 ASSAY THYROID STIM HORMONE: CPT | Performed by: PHYSICIAN ASSISTANT

## 2021-03-31 PROCEDURE — 84481 FREE ASSAY (FT-3): CPT | Performed by: PHYSICIAN ASSISTANT

## 2021-04-02 RX ORDER — LEVOTHYROXINE SODIUM 125 UG/1
125 TABLET ORAL DAILY
Qty: 90 TABLET | Refills: 1 | Status: SHIPPED | OUTPATIENT
Start: 2021-04-02 | End: 2021-09-27

## 2021-04-24 ENCOUNTER — IMMUNIZATION (OUTPATIENT)
Dept: NURSING | Facility: CLINIC | Age: 46
End: 2021-04-24
Attending: INTERNAL MEDICINE
Payer: COMMERCIAL

## 2021-04-24 PROCEDURE — 0012A PR COVID VAC MODERNA 100 MCG/0.5 ML IM: CPT

## 2021-04-24 PROCEDURE — 91301 PR COVID VAC MODERNA 100 MCG/0.5 ML IM: CPT

## 2021-05-25 ENCOUNTER — OFFICE VISIT (OUTPATIENT)
Dept: OBGYN | Facility: CLINIC | Age: 46
End: 2021-05-25
Payer: COMMERCIAL

## 2021-05-25 VITALS
BODY MASS INDEX: 32.43 KG/M2 | HEIGHT: 63 IN | SYSTOLIC BLOOD PRESSURE: 112 MMHG | WEIGHT: 183 LBS | DIASTOLIC BLOOD PRESSURE: 62 MMHG

## 2021-05-25 DIAGNOSIS — Z01.419 ENCOUNTER FOR GYNECOLOGICAL EXAMINATION WITHOUT ABNORMAL FINDING: Primary | ICD-10-CM

## 2021-05-25 PROCEDURE — G0124 SCREEN C/V THIN LAYER BY MD: HCPCS | Performed by: PATHOLOGY

## 2021-05-25 PROCEDURE — 99386 PREV VISIT NEW AGE 40-64: CPT | Performed by: OBSTETRICS & GYNECOLOGY

## 2021-05-25 PROCEDURE — G0145 SCR C/V CYTO,THINLAYER,RESCR: HCPCS | Performed by: OBSTETRICS & GYNECOLOGY

## 2021-05-25 PROCEDURE — 87624 HPV HI-RISK TYP POOLED RSLT: CPT | Performed by: OBSTETRICS & GYNECOLOGY

## 2021-05-25 ASSESSMENT — ANXIETY QUESTIONNAIRES
IF YOU CHECKED OFF ANY PROBLEMS ON THIS QUESTIONNAIRE, HOW DIFFICULT HAVE THESE PROBLEMS MADE IT FOR YOU TO DO YOUR WORK, TAKE CARE OF THINGS AT HOME, OR GET ALONG WITH OTHER PEOPLE: SOMEWHAT DIFFICULT
3. WORRYING TOO MUCH ABOUT DIFFERENT THINGS: SEVERAL DAYS
1. FEELING NERVOUS, ANXIOUS, OR ON EDGE: SEVERAL DAYS
6. BECOMING EASILY ANNOYED OR IRRITABLE: NOT AT ALL
5. BEING SO RESTLESS THAT IT IS HARD TO SIT STILL: NOT AT ALL
7. FEELING AFRAID AS IF SOMETHING AWFUL MIGHT HAPPEN: SEVERAL DAYS
2. NOT BEING ABLE TO STOP OR CONTROL WORRYING: SEVERAL DAYS
GAD7 TOTAL SCORE: 5

## 2021-05-25 ASSESSMENT — PATIENT HEALTH QUESTIONNAIRE - PHQ9
5. POOR APPETITE OR OVEREATING: SEVERAL DAYS
SUM OF ALL RESPONSES TO PHQ QUESTIONS 1-9: 0

## 2021-05-25 ASSESSMENT — MIFFLIN-ST. JEOR: SCORE: 1433.17

## 2021-05-25 NOTE — PATIENT INSTRUCTIONS
Schedule your annual screening mammogram  Follow up with your primary care provider for your other medical problems.  Continue self breast exam.  Increase physical activity and exercise.  Lab and pap smear results will be called to the patient.  Co-testing done today and will repeat in 5yrs if negative.  Usual safety and preventative measures counseling done.  BMI >25  Weight loss discussed and encouraged.

## 2021-05-25 NOTE — PROGRESS NOTES
Deysi is a 46 year old  female who presents for annual exam.     Besides routine health maintenance, she has no other health concerns today .    HPI:  New patient --self referral --here today for yearly exam.  Has seen Dr. Diego in the past but has not seen GYN for several years.  Regular, monthly menses x 3-4d.  Periods have gotten a bit shorter over the last few years --denies heavy bleeding or cramping.  No intermenstrual bleeding/spotting.  No hot flushes or night sweats.  +SA --no issues.  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 Kory Willis; follows bloodwork, meds, thyroid, etc  Psych -Dr. Slaughter; currently on lexapro and zyprexa; working on weaning zyprexa; moods well controlled  -completed COVID vaccine series      GYNECOLOGIC HISTORY:    Patient's last menstrual period was 2021 (approximate).    Regular menses? yes  Menses every 25 days.  Length of menses: 3-4 days    Her current contraception method is: vasectomy.  She  reports that she has never smoked. She has never used smokeless tobacco.    Patient is sexually active.  STD testing offered?  Declined  Last PHQ-9 score on record =   PHQ-9 SCORE 2021   PHQ-9 Total Score -   PHQ-9 Total Score MyChart -   PHQ-9 Total Score 0     Last GAD7 score on record =   MARCELO-7 SCORE 2021   Total Score -   Total Score 5     Alcohol Score = 0    HEALTH MAINTENANCE:  Cholesterol:   Cholesterol   Date Value Ref Range Status   2015 180 <200 mg/dL Final     Comment:     LDL Cholesterol is the primary guide to therapy.   The NCEP recommends further evaluation of: patients with cholesterol greater   than 200 mg/dL if additional risk factors are present, cholesterol greater   than   240  mg/dL, triglycerides greater than 150 mg/dL, or HDL less than 40 mg/dL.     2014 178 0 - 200 mg/dL Final     Comment:     LDL Cholesterol is the primary guide to therapy.   The NCEP recommends further evaluation of: patients with cholesterol greater   than 200 mg/dL if additional risk factors are present, cholesterol greater   than   240 mg/dL, triglycerides greater than 150 mg/dL, or HDL less than 40 mg/dL.     Last Mammo: , Result: Normal, Next Mammo: scheduled thursday  Pap:   Lab Results   Component Value Date    PAP NIL 2014    PAP NIL 2012    PAP NIL 2010 WNL   Colonoscopy:  NA, Result: Not applicable, Next Colonoscopy: NA years.  Dexa:  NA    Health maintenance updated:  yes    HISTORY:  OB History    Para Term  AB Living   4 2 2 0 2 2   SAB TAB Ectopic Multiple Live Births   2 0 0 0 2      # Outcome Date GA Lbr Gabriel/2nd Weight Sex Delivery Anes PTL Lv   4 Term 11/20/10 39w0d  4.111 kg (9 lb 1 oz)  CS-LTranv   ROYAL      Apgar1: 9  Apgar5: 9   3 SAB 09     SAB         Birth Comments: D&C   2 Term 07 38w0d  3.6 kg (7 lb 15 oz) F CS-Unspec   ROYAL      Birth Comments: Breech      Name: Pau Sheridan SAB 2006     SAB   DEC      Birth Comments: D&C       Patient Active Problem List   Diagnosis     Septate Uterus     CARDIOVASCULAR SCREENING; LDL GOAL LESS THAN 160     Mild major depression (H)     MARCELO (generalized anxiety disorder)     Hypothyroidism due to Hashimoto's thyroiditis     Binge-eating disorder, moderate     Alcohol use disorder, mild, in sustained remission     Throat irritation     Past Surgical History:   Procedure Laterality Date     C  DELIVERY ONLY       C/SECTION, LOW TRANSVERSE  2011    repeat     D & C  ,     missed Ab     ESOPHAGOSCOPY, GASTROSCOPY, DUODENOSCOPY (EGD), COMBINED N/A 1/3/2018    Procedure: COMBINED ESOPHAGOSCOPY, GASTROSCOPY, DUODENOSCOPY (EGD), BIOPSY SINGLE OR MULTIPLE;  Upper Endo;  Surgeon:  "David Kumari MD;  Location:  OR      Social History     Tobacco Use     Smoking status: Never Smoker     Smokeless tobacco: Never Used   Substance Use Topics     Alcohol use: No     Alcohol/week: 0.0 standard drinks      Problem (# of Occurrences) Relation (Name,Age of Onset)    C.A.D. (1) Father: MI    Depression (2) Father, Brother    Lipids (1) Father    No Known Problems (6) Brother, Paternal Grandmother, Paternal Grandfather, Daughter, Daughter, Other    Osteoporosis (2) Mother, Maternal Grandmother    Substance Abuse (2) Father, Brother    Thyroid Disease (6) Mother, Sister, Brother, Maternal Grandmother, Maternal Grandfather, Maternal Aunt            Current Outpatient Medications   Medication Sig     CALCIUM + D 600-200 MG-UNIT OR TABS 2 TABLETS DAILY     escitalopram (LEXAPRO) 10 MG tablet Take 10 mg by mouth daily     levothyroxine (SYNTHROID/LEVOTHROID) 125 MCG tablet Take 1 tablet (125 mcg) by mouth daily     multivitamin, therapeutic with minerals (MULTI-VITAMIN) TABS tablet Take 1 tablet by mouth daily     OLANZapine (ZYPREXA) 2.5 MG tablet Take 0.5 mg by mouth At Bedtime     ZYRTEC 10 MG PO TABS 1 TABLET DAILY     valACYclovir (VALTREX) 1000 mg tablet Take 2 tablets (2,000 mg) by mouth 2 times daily (Patient not taking: Reported on 5/25/2021)     No current facility-administered medications for this visit.      Allergies   Allergen Reactions     Shellfish Allergy Anaphylaxis     Peanuts [Nuts] Difficulty breathing     Phenylephrine      Cardiac arrhythmia       Past medical, surgical, social and family histories were reviewed and updated in EPIC.    ROS:   12 point review of systems negative other than symptoms noted below or in the HPI.  No urinary frequency or dysuria, bladder or kidney problems, Normal menstrual cycles    EXAM:  /62   Ht 1.607 m (5' 3.25\")   Wt 83 kg (183 lb)   LMP 05/04/2021 (Approximate)   Breastfeeding No   BMI 32.16 kg/m     BMI: Body mass index is 32.16 " kg/m .    PHYSICAL EXAM:  Constitutional:   Appearance: Well nourished, well developed, alert, in no acute distress  Neck:  Lymph Nodes:  No lymphadenopathy present    Thyroid:  Gland size normal, nontender, no nodules or masses present  on palpation  Chest:  Respiratory Effort:  Breathing unlabored  Cardiovascular:    Heart: Auscultation:  Regular rate, normal rhythm, no murmurs present  Breasts: Inspection of Breasts:  No lymphadenopathy present., Palpation of Breasts and Axillae:  No masses present on palpation, no breast tenderness., Axillary Lymph Nodes:  No lymphadenopathy present. and No nodularity, asymmetry or nipple discharge bilaterally.  Gastrointestinal:   Abdominal Examination:  Abdomen nontender to palpation, tone normal without rigidity or guarding, no masses present, umbilicus without lesions   Liver and Spleen:  No hepatomegaly present, liver nontender to palpation    Hernias:  No hernias present  Lymphatic: Lymph Nodes:  No other lymphadenopathy present  Skin:  General Inspection:  No rashes present, no lesions present, no areas of  discoloration  Neurologic:    Mental Status:  Oriented X3.  Normal strength and tone, sensory exam                grossly normal, mentation intact and speech normal.    Psychiatric:   Mentation appears normal and affect normal/bright.         Pelvic Exam:  External Genitalia:     Normal appearance for age, no discharge present, no tenderness present, no inflammatory lesions present, color normal  Vagina:     Normal vaginal vault without central or paravaginal defects, no discharge present, no inflammatory lesions present, no masses present  Bladder:     Nontender to palpation  Urethra:   Urethral Body:  Urethra palpation normal, urethra structural support normal   Urethral Meatus:  No erythema or lesions present  Cervix:     Appearance healthy, no lesions present, nontender to palpation, no bleeding present  Uterus:     Uterus: firm, normal sized and nontender,  anteverted in position.   Adnexa:     No adnexal tenderness present, no adnexal masses present  Perineum:     Perineum within normal limits, no evidence of trauma, no rashes or skin lesions present  Anus:     Anus within normal limits, no hemorrhoids present  Inguinal Lymph Nodes:     No lymphadenopathy present  Pubic Hair:     Normal pubic hair distribution for age  Genitalia and Groin:     No rashes present, no lesions present, no areas of discoloration, no masses present      COUNSELING:   Reviewed preventive health counseling, as reflected in patient instructions  Special attention given to:        Regular exercise       Healthy diet/nutrition       Colon cancer screening       ([place])menopause management    BMI: Body mass index is 32.16 kg/m .  Weight management plan: Discussed healthy diet and exercise guidelines Patient was referred to their PCP to discuss a diet and exercise plan.    ASSESSMENT:  46 year old female with satisfactory annual exam.    ICD-10-CM    1. Encounter for gynecological examination without abnormal finding  Z01.419 Pap imaged thin layer screen with HPV - recommended age 30 - 65     HPV High Risk Types DNA Cervical       PLAN:  Patient Instructions   Schedule your annual screening mammogram  Follow up with your primary care provider for your other medical problems.  Continue self breast exam.  Increase physical activity and exercise.  Lab and pap smear results will be called to the patient.  Co-testing done today and will repeat in 5yrs if negative.  Usual safety and preventative measures counseling done.  BMI >25  Weight loss discussed and encouraged.      Toma Walker MD

## 2021-05-26 ASSESSMENT — ANXIETY QUESTIONNAIRES: GAD7 TOTAL SCORE: 5

## 2021-05-27 ENCOUNTER — MYC MEDICAL ADVICE (OUTPATIENT)
Dept: FAMILY MEDICINE | Facility: CLINIC | Age: 46
End: 2021-05-27

## 2021-05-27 ENCOUNTER — MYC MEDICAL ADVICE (OUTPATIENT)
Dept: OBGYN | Facility: CLINIC | Age: 46
End: 2021-05-27

## 2021-05-27 DIAGNOSIS — Z12.31 VISIT FOR SCREENING MAMMOGRAM: ICD-10-CM

## 2021-05-27 LAB
COPATH REPORT: ABNORMAL
PAP: ABNORMAL

## 2021-05-27 PROCEDURE — 77067 SCR MAMMO BI INCL CAD: CPT | Mod: TC | Performed by: RADIOLOGY

## 2021-05-27 PROCEDURE — 77063 BREAST TOMOSYNTHESIS BI: CPT | Mod: TC | Performed by: RADIOLOGY

## 2021-06-01 LAB
FINAL DIAGNOSIS: NORMAL
HPV HR 12 DNA CVX QL NAA+PROBE: NEGATIVE
HPV16 DNA SPEC QL NAA+PROBE: NEGATIVE
HPV18 DNA SPEC QL NAA+PROBE: NEGATIVE
SPECIMEN DESCRIPTION: NORMAL
SPECIMEN SOURCE CVX/VAG CYTO: NORMAL

## 2021-06-04 VITALS — BODY MASS INDEX: 27.46 KG/M2 | WEIGHT: 155 LBS | HEIGHT: 63 IN

## 2021-06-05 NOTE — PROGRESS NOTES
Physical Therapy  Physical Therapy Daily Outpatient Documentation        Rx Units: 3 - TE    Total OP Minutes: 50    Treatment #: 1/4    Pain: 2/10, 0/10  Location: neck pain, headache  Intervention: see below    Subjective: Patient reports that she is looking forward to working on strengthening.  She has been getting headaches 1x/wk.  Her neck pain continues to be triggered by driving.     Therapeutic Exercise  Total Minutes: 50  In order to reduce neck pain and address mm tone:  -NuStep x 6 minutes   Workload #5 Ave METs: 3.3  Ave SPM: 80  In order to address muscle weakness and imbalance for posture and pain management:  - Supine chin tucks with towel roll x 10 reps with 12 second isometric holds - VC for 50% contraction and place tongue to roof of mouth to facilitate DNFs  - Supine chin tucks with isometric extension x 10 reps with 5-8 seconds holds - VC for 50% isometric contraction to avoid overuse  - Seated upper trap stretch x 30 seconds x 1 reps each side - VC for proper hand placement  - Seated levator scapula stretch x 30 seconds x 1 reps each side - VC for proper hand placement  - Seated SCM/scalene stretch x 30 seconds x 1 reps each side - VC for proper hand placement and technique  In order to improve scapular control to reduce neck pain and address posture:  -side lying scap retraction/depression x 5 reps on B- VC and TC for increased scapular depression.  -side lying scap retraction/depression with shoulder ER x 10 reps on B with 2# weigths. VC for scap set followed by ER and maintain scap set during eccentric movement  -Prone scap retraction/depression B x 10 reps - VC and TC for increased scapular depression and avoid overuse of upper trap  -Prone scap retraction/depression with arm lift in I position x 10 reps  B- VC for scap set before arm lift and maintain scapular depression.  -Prone scap retraction/depression with arm in W position x 10 reps - TC and VC for increased scapular depression. Pt  unable to lift arm  -Seated scap retraction/depression B x 8 reps - VC for increased scap depression.   -Standing scap retraction with orange theraband x 10 reps - VC for increased scapular depression, TA engagement and chin tuck. Pt tends to over recruit upper traps at end of ROM.  -Corner pec major stretch x 30 seconds  - VC for technique.  -Corner pec minor stretch x 30 seconds - VC for proper technique and form  -Pt issued written HEP and all questions addressed.     Written HEP:  -chin tucks  -UT stretch  -levator scap stretch  -SCM stretch  -Pec major/minor stretches  -SL ER  -prone I lift  -seated scap set        Neuro Re-Ed/Balance  Total Minutes:       Gait Training  Total Minutes:       Other:   Total Minutes:   NDI score: 10/50 (20%  - mild impairment)      Assessment/Plan for week of 2/3/2020-2/7/2020:  Patient returns for first subsequent treatment session after initial evaluation.  She continues to note neck pain with driving and some functional activities at home. Patient was able to completed postural strengthening exercises with moderate cues for proper form and technique. She tends to over recruit accessory muscles and required additional TC for isolation of desired muscle activation.  Current POC and goals remain appropriate.  Plan to continue advancing postural strengthening program and initiate abdominal strengthening to assist with posture and pain management.     Additional Notes:  Goals:  1) Pt will report max neck pain at < 3/10 in order to tolerate driving with greater ease.  2) Pt will report max HA intensity at < 3/10 in order to care for her children with greater ease.  3)Pt will demonstrate a 10% improvement in NDI in order to participate in daily exercise routine with less discomfort for general health and fitness.   4)Pt will demonstrate independence with HEP to self manage symptoms.

## 2021-06-05 NOTE — PROGRESS NOTES
Assessment:     1. Concussion, with loss of consciousness.    2. Post concussion syndrome  3. Dizziness  4. Headaches    Plan:     Ordered today: Neuropsych, PT eval      Neuropsychological assessment   Yes  (2 hours)  PT to evaluate and treat  Yes  OT to evaluate and treat  No  ST to evaluate and treat  No  Referral to psychology No  MRI/CT ordered today : No  Labs ordered today : No  New medication :  No  2    Pain control for headaches - Tylenol only due to rebound headaches, [place]/blue tinted glasses  Current PT  No, she is doing cranial sacral massage      Current OT  No      Current ST  No       Current Chiropractic  Yes  Psychiatrist currently Yes  Past:  Yes  Psychologist currently Yes  Past:  Yes  Primary: Currently Yes                     MRI/CT Completed  Yes     Labs Completed  No       Sleeping Problems-monitor, sleep hygiene          Currently on medication  No     Anxiety due to concussion - monitor        Currently on medication  Yes, Lexapro      Decreased concentration and focus - monitor        Currently on medication No         Need work/school note written today   No     Are you on a controlled substance  No    Date of accident: 12/2/19  Workman's Comp   No     Discussed: RED FLAGS NEEDING ACUTE EMERGENCY MANAGEMENT:                          Headaches that worsen                          Seizures                          Focal Neurologic Signs                          Drowsiness/Lethargy                          Repeated vomiting                          Slurred Speech                          Inability to recognize people/places                          Increasing confusion/irritability                          Weakness/numbness                          Neck pain                          Unusual behavioral change                          Change in level of consciousness    Subjective:          HPI  She is a 44 y.o. female who presents with a blunt/closed head injury which she sustained while at  the [employer] on 12/2/19.  She was walking back to her car from the grocery store, she slipped on ice, and fell backwards hitting her head on the ice    Most severe symptoms: she has been dizzy     Injury Description:               Was there a forcible blow to the head?:                     Yes                          Evidence of intracranial injury or skull fracture?:        No                            Location of Impact on the head:        Back on head                               Retrograde Amnesia (loss of memory of events before the injury)?:  No  Anterograde Amnesia (loss of memory of events following injury)?:  No  Number of previous head injuries.                                                       0  Loss of Consciousness:                                                                      No    Early Signs:  Symptoms were first noted when     Immediately                    Headaches:   Significant ongoing headaches Yes  Headaches are Intermittently, but she is having the concussion headache         Physical Symptoms:  Headache-Yes      Resolved No           Improved since accident Improved     Nausea- Yes      Resolved No        Improved since accident    Improved     Vomiting - No           Balance problems - Yes       Resolved Yes   Dizziness - Yes      Resolved No          Improved since accident Improved   Visual problems - No        Fatigue - Yes     Resolved No           Improved since accident Same    Sensitivity to light - No       Sensitivity to sound - Yes      Resolved No        Improved since accident Same    Numbness/tingling - No           Cognitive Symptoms  Feeling mentally foggy - Yes         Resolved No       Improved since accident Improved    Feeling slowed down - Yes         Resolved No         Improved since accident Improved    Difficulty Concentrating- Yes        Resolved   No     Improved since accident Improved    Difficulty remembering - Yes         Resolved No        Improved since accident Improved      Emotional Symptoms  Irritability - Yes        Resolved No         Improved since accident Improved    Sadness-   Yes       Resolved No        Improved since accident Improved    More emotional - Yes       Resolved No       Improved since accident Improved    Nervousness/anxiety - Yes       Resolved No        Improved since accident Improved        Psychiatric History:  Anxiety - Yes  Depression - Yes  Other mental health dx:  No    Sleep Disorders - No  The patient denies being a victim of abuse.     Ever Hospitalized for mental health:             No  Any thought of hurting self or others now?   No  Any history of hurting self or others?            No  Any history of legal/gross misdemeanor or higher:  No     Family Psychiatric History:  Mother's side                              No  Father's side                               Yes  Adopted                                      No    Sleep History:  Drowsiness- No         Resolved No        Improved since accident Improved    Sleep less than usual - Yes  Sleep more than usual - No  Trouble falling asleep - No         Does the patient wake feeling rested - No       Resolved No         Improved since accident Improved       Previous concussions  No     Migraine Headaches      Patient history of migraines.    No      Family history of migraines    No    Exertion:         Do the above stated symptoms worsen with physical activity? Yes        Do the above stated symptoms worsen with cognitive activity? Yes         Work/School        Do the above stated symptoms worsen with school/work?        Yes        Have your returned to work/school?            Yes  Full hours:     Yes        Any days off after concussion?                                Yes           There are no active problems to display for this patient.    No past medical history on file.  No past surgical history on file.  No family history on file.  Current Outpatient  Medications   Medication Sig Dispense Refill     escitalopram oxalate (LEXAPRO) 10 MG tablet Take 10 mg by mouth.       levothyroxine (SYNTHROID, LEVOTHROID) 112 MCG tablet Take 112 mcg by mouth.       No current facility-administered medications for this encounter.        No Known Allergies  Social History     Socioeconomic History     Marital status:      Spouse name: Not on file     Number of children: Not on file     Years of education: Not on file     Highest education level: Not on file   Occupational History     Not on file   Social Needs     Financial resource strain: Not on file     Food insecurity:     Worry: Not on file     Inability: Not on file     Transportation needs:     Medical: Not on file     Non-medical: Not on file   Tobacco Use     Smoking status: Never Smoker   Substance and Sexual Activity     Alcohol use: Not on file     Drug use: Not on file     Sexual activity: Not on file   Lifestyle     Physical activity:     Days per week: Not on file     Minutes per session: Not on file     Stress: Not on file   Relationships     Social connections:     Talks on phone: Not on file     Gets together: Not on file     Attends Protestant service: Not on file     Active member of club or organization: Not on file     Attends meetings of clubs or organizations: Not on file     Relationship status: Not on file     Intimate partner violence:     Fear of current or ex partner: Not on file     Emotionally abused: Not on file     Physically abused: Not on file     Forced sexual activity: Not on file   Other Topics Concern     Not on file   Social History Narrative     Not on file       The following portions of the patient's history were reviewed and updated as appropriate: allergies, current medications, past family history, past medical history, past social history, past surgical history and problem list.    Smoke History: never      Review of Systems  A comprehensive review of systems was negative except  for: nausea    Patient History  Patient was referred to the concussion clinic by primary. The patient was born in [place] and has lived here for most of her life. She is currently living with her  of 15 years in their family home . They have two children ages 9 and 12    Currently employed     No  She is a stay at home mom     She normally exercises frequently by going to the gym, but has not been able to exercise since the injury. Education includes a master's degree. She reports having great grades through high school and college.  She denies any developmental problems, learning disabilities, or history of ADHD. The patient denies any unexplained weight loss or gain. The patient denies that the symptoms wake her up at night. She reports feeling down and depressed. The patient reports being bothered by having little interest or pleasure in doing things.     Objective:       Discussion was held with the patient today regarding concussion in general including types of injury, symptoms that are common, treatment and variability in time to recover. I also provided written information about concussion and symptoms that would apply to her in her concussion folder. Education about concussion symptoms and length of time it would take the patient to recover was also given to the patient.  I have reassured the patient her symptoms are very common when a concussion is present and will improve with time. We discussed the risks and benefits of the medication including risk of worsening depression with medication adjustments and even the possibility of emergence of suicidal ideations.       Total time spent with the patient today was 60 minutes with greater than 50% of the time spent in counseling and care coordination. The patient will return in about 5 weeks to this clinic for further assessment and treatment. She agrees to call before then with any questions, concerns or problems. We will assess for the  appropriateness of possible psychotropic medication trials/changes. The patient will seek out appropriate emergency services should that become necessary.I will be available if any questions, concerns, or problems that arise.     Physical Exam: General appearance: alert, appears stated age, cooperative and no distress. Yes  Sensitivity to light. No  Head: Normocephalic, without obvious abnormality, atraumatic Yes  Neck:  Full ROM  No with pain or stiffness Yes    Neurologic:   Mental status: Alert, oriented, thought content appropriate, affect: mood-congruent. Recent and remote memory grossly intact.  Yes  Speech is clear and fluent with no obvious word finding or paraphasic errors. Yes  Pupils are equal and react to light direct and consensual accommodation.Yes  EOMs are intact   Yes  nystagmus. No   Exophoria/exotropia noted. No  Visual fields are grossly full. Yes  Saccade and smooth pursuit grossly intact. Yes  Full facial movements, Yes  Tongue protrudes midline soft palate rises symmetrically. Yes  Shoulder shrug is intact. Yes  Strength is 5/5, No pronator drift. Yes  Accurate finger to nose and sensation is intact to double simultaneous stimulation.Yes   Reflexes are symmetrical Yes  Toes are down going. Yes  Romberg is positive         Mental Status Examination  Patient is casually dressed and seated for evaluation. She is cooperative with questioning and eye contact is good. She is fully engaged in conversation today. She is alert and fully oriented. Speech is normal. Thought processes normal with normal prehension and expression. Thoughts are organized and linear. Content is pertinent to the conversation and without evidence of auditory or visual hallucinations. No delusional ideation. Affect/mood is euthymic-bright, even. Gen. fund of knowledge, insight and memory are normal.

## 2021-06-05 NOTE — PROGRESS NOTES
The patient was seen for a neuropsychological evaluation for the purposes of diagnostic clarification and treatment planning. 25 minutes of face-to-face testing were provided by this writer. An additional 15 minutes were spent scoring and compiling test results.The patient was cooperative with testing. No concerns were brought to my attention. Please see Dr. Gonzales's report for a detailed description of the charges and interpretation and integration of the findings.

## 2021-06-05 NOTE — PROGRESS NOTES
"BRIEF NEUROPSYCHOLOGICAL CONSULTATION    NAME: Dyesi Douglas  YOB: 1975     DATE OF EVALUATION: 2/4/2020    REASON FOR REFERRAL:  Ms. Deysi Douglas is a 44 y.o., right-handed,  female who presents to the [facility] for further evaluation and management of a concussion injury she sustained on 12/2/2019.  She was referred for neuropsychological consultation by R[facility] to provide information regarding cognitive and emotional functioning following her mild brain injury.     Verbal consent for neuropsychological testing was received following the provision of information about the nature and purpose of the evaluation, and the opportunity to ask questions.     HISTORY OF PRESENT ILLNESS:  On Monday, 12/2/2019, the patient was walking from the grocery store to her car when she slipped on ice. She fell backwards and landed on her back, hitting her head on the ground. She did not lose consciousness and denied posttraumatic amnesia. She felt stunned and immediately experienced jaw and neck pain and felt as though she was unsafe to move. Fortunately, there was a fire department right across the street, and several fire fighters came to her assistance after they witnessed her fall. They put a collar on her neck and assessed her. They determined she would not need an ambulance to get to the ED, so her  came and brought her to [facility]. There, she reported dizziness, jaw pain, headache, slight nausea, slight photophobia, neck pain, tailbone pain, and shoulder pain. She underwent a head CT, which was negative for any acute intracranial abnormalities. They prescribed Zofran for nausea and diagnosed her with a head injury. She went to a chiropractor the following day, who put her jaw back into alignment. The patient reported that she felt \"okay\" for a few days after that; however, on Friday she began experiencing a significant " exacerbation in symptoms, particularly vertigo and significant exacerbation of depression and anxiety. She saw her PCP on 12/9, who referred her to the [facility] for ongoing management of her concussion symptoms. The patient has also since been treated with acupuncture and cranial sacral massage therapy. 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    The patient was initially evaluated in the [facility] by [facility], on 1/14/2020. Due to ongoing physical and cognitive symptoms, the patient was referred to physical therapy and for this neuropsychological evaluation. The patient was picked up for PT treatment sessions to work on strengthening, and she continues to participate in acupuncture and cranial sacral massage therapy for her physical symptoms.    Today, the patient completed a subsection of the Sports Concussion Assessment Tool-3 (SCAT-3) that assesses post-concussive symptoms. Out of 22 possible symptoms, the patient endorsed 11 of them. Her reported symptom severity score was 19 out of 132, suggesting that the symptoms she experiences are generally mild to moderate in intensity. Specifically, she endorsed mild symptoms of phonosensitivity, drowsiness, not feeling right, reduced energy, attention/concentration problems, memory problems, and mental fogginess.  Moderate symptoms of irritability, sadness, feeling more emotional, and anxiety were also endorsed. No severe symptoms were reported.     With regard to her cognitive symptoms more specifically, the patient reported noticing mild concentration issues, which started about 5 days after her concussion and have gradually been improving over time. She does not quite feel back to her pre-injury level of functioning yet. Her  also notices these concentration issues. The patient reported that mental activities (especially ones that require sustained concentration) tend to exacerbate her physical symptoms. Other cognitive symptoms were  denied.    Emotionally, the patient reported a longstanding history of depression and anxiety that was largely well-controlled prior to the concussion. However, her anxiety and depression significantly worsened following the concussion. She noted that when her psychiatric symptoms worsen, her rumination keeps her awake at night, which leads to a downward spiral. She was having suicidal ideation after the concussion. She saw her psychiatrist last week, who prescribed 1 mg of Zyprexa to help her sleep at night. This was effective, and now the patient feels that her depression and anxiety have significantly improved over the past week. The patient also has a psychotherapist who she sees weekly. She denied current suicidal ideation. Aside from the concussion and ongoing symptoms, she endorsed ongoing stress related to remodeling of her home. Her family is currently living in a rental home during the remodel, and there have been many delays. In addition, her youngest daughter has dyslexia and has been struggling with her , so the patient has felt more pressure to help support her at home and feels as though she should be doing more for her. Current substance abuse was denied.     With regard to sleep, the patient reported that shortly after the concussion, she started waking up in a panic at 3 AM and had difficulty falling back to sleep due to anxiety. Since starting on Zyprexa last week, her sleep has notably improved and she is sleeping well through the night.    With regard to her engagement in the activities of daily living, Ms. Douglas reported that she has largely returned to all of her normal activities at this point, aside from exercise. She is eager to get guidance/clearance from PT to return to cardio exercise. She has also hired a cook to help out for the next two months. Otherwise, she is able to tolerate driving, screens, has started listening to podcasts again, and has no issues with medication  management.    PAST MEDICAL HISTORY:  The patient's medical history is otherwise positive for hypothyroidism. This is her first concussion.    No past surgical history on file.    FAMILY MEDICAL HISTORY:  No family history on file.    DIAGNOSTIC STUDIES:  A recent head CT (12/2/2019) was reportedly negative for acute intracranial pathology.     CURRENT MEDICATIONS:  Current Outpatient Medications:      escitalopram oxalate (LEXAPRO) 10 MG tablet, Take 10 mg by mouth., Disp: , Rfl:      levothyroxine (SYNTHROID, LEVOTHROID) 112 MCG tablet, Take 112 mcg by mouth., Disp: , Rfl:    In addition, the patient was prescribed 1 mg of Zyprexa by her psychiatrist last week.     PSYCHIATRIC HISTORY:  Ms. Douglas endorsed a longstanding history of depression and anxiety, for which she has taken medications and participated in psychotherapy for several years. She denied any history of psychiatric hospitalizations, but noted that she presented to the ED last fall due to heightened depression, anxiety, and suicidal ideation. They did not admit her. They prescribed Zyprexa, which helped her sleep and subsequently helped her anxiety/mood. Per medical record review, the patient has previous diagnoses of Major Depressive Disorder and Generalized Anxiety Disorder.    SUBSTANCE USE HISTORY:  Ms. Douglas denies any history of chemical dependency diagnosis, treatment, or hospitalization. She was a never smoker.    RELEVANT SOCIAL HISTORY:  Ms. Douglas was born and raised in [place]. She graduated high school and earned a Bachelor's degree in American Studies from the [employer]. She then went on to earn a Master's degree in Teaching from [employer]. She earned straight A's throughout her schooling. Significant learning difficulties or developmental attention issues were denied. She worked as an  for several years until she had her first child 12 years ago. 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 She has  been  for 15 years, and they have two children together. The patient and her family live together in their own home in [place], [place].    SERVICES & TESTS ADMINISTERED:  A clinical interview/neurobehavioral status examination was conducted with the patient and documented. I thoroughly reviewed the medical record, selected the neuropsychological test battery, provided supervision to the trained examiner/technician, interpreted/integrated patient data and test results, engaged in clinical decision making, treatment planning and report writing/preparation, and provided direct face-to-face feedback on the test results. A trained examiner/technician administered and scored the neuropsychological tests (2+ tests).  Please see below for a breakdown of time spent and the associated codes billed for these services.  Services   Time Spent  CPT Codes   Neurobehavioral Status Exam:  (e.g., face-to-face, interpretation, report)   50 minutes   1 x 96116   Neuropsychological Evaluation Services:   (e.g., integration, interpretation, treatment planning, clinical decision making, feedback)   102 minutes   1 x 96132  2 x 96133   Neuropsychological Testing by Trained Examiner/Technician:  (e.g., test administration, scoring, 2+ tests administered)   40 minutes   1 x 96138       For diagnostic and coding purposes, Ms. Douglas has a history of mild traumatic brain injury, depression, anxiety, and hypothyroidism. She was referred for an evaluation of mild neurocognitive disorder.      The test battery included Wide Range Achivement Test-IV (select subtests), Repeatable Battery for the Assessment of Neuropsychological Status-Update, Trailmaking Test, Patient Health Questionnaire-9, the Generalized Anxiety Disorder-7 Assessment, and a subsection of the SCAT-3 post-concussive symptom questionnaire.    MENTAL STATUS EXAM AND BEHAVIORAL OBSERVATIONS:  Ms. Douglas arrived on time and unaccompanied to today's appointment. She was  appropriately dressed and groomed. She appeared alert and engaged. Gait and other motor activity appeared normal. No vision or hearing difficulties were observed. Conversational speech was of normal rate, volume, and prosody. No word-finding pauses or paraphasias were noted. Her thought process appeared linear and goal-directed. No hallucinations or delusions were apparent. Judgment and insight appeared intact. Her mood was euthmyic and her affect was appropriately reactive. Rapport was easily established and eye contact was unremarkable. She was pleasant and cooperative throughout the evaluation. She understood test instructions without difficulty. Ms. Douglas appeared adequately motivated and engaged easily during testing. Therefore, the following results are considered a valid estimation of her current cognitive abilities.    ESTIMATED OPTIMAL PREMORBID FUNCTIONING:  Optimal premorbid intellectual abilities were estimated as falling in the high average range based on Ms. Douglas's educational and occupational histories and performance on a task least likely to be affected by acquired brain dysfunction.    TEST RESULTS:  Ms. Douglas appeared fully oriented.  Auditory attention and working memory performances fell in the average range of functioning.  Specifically, she was able to immediately recall up to 7 digits presented auditorily.    Comprehension appeared fully intact.  Confrontation naming was fully intact.  Her performance on a measure of semantic verbal fluency fell in the high average range of functioning overall.     Cognitive speed and processing accuracy fell in the average range of functioning on a task that required her to use numbers to rapidly decode a series of abstract symbols.  Her performance fell in the superior range on a task that required her to quickly connect a series of numbers presented in a visual array on a page. Her performance was in the average range on a subsequent task that required  mental flexibility and set-shifting to quickly alternate between sequencing letters and numbers presented on a page.    Visual attention and spatial localization skills were above average.  Two dimensional visuoconstruction of a geometric design was in the high average range relative to peers.     With regard to learning and memory, Ms. Douglas was also administered measure of rote auditory verbal list learning that required her to learn a series of 10 words over four trials and retain and recall them over a long (20 minute) delay.  Her initial rate of learning fell in the superior range of functioning (raw score recall over trials = 7, 9, 10, 10).  Ms. Douglas retained and recalled approximately 100% of the previously learned information over the long delay (superior performance).  Recognition memory was fully intact.  Contextual auditory verbal learning abilities were superior and she retained and recalled 83% of the previously learned information over a delay (average).  Her recall of a geometric design was in the high average range, with a 90% retention rate.    On the Patient Health Questionnaire-9, a self report measure of depressive symptomatology, she obtained a score of 25, placing her in the range of severe depression.  She endorsed passive suicidal ideation without intent. On the Generalized Anxiety Disorder-7, a self-report measure of anxiety, she obtained a score of 18,  placing her in the range of severe anxiety. Of note, the patient stated that she responded to the items on this questionnaire based on how she has felt over the past two weeks (per the instructions); however, over the past one week her mood has significantly improved. Thus, she stated that the questionnaires are not an accurate reflection of how she is feeling today.    SUMMARY OF FINDINGS:  Ms. Deysi Douglas is a 44 y.o., right-handed, female, with a history of recent mild traumatic brain injury who was referred for a brief  neuropsychological screening evaluation by [facility] to assist with treatment planning.     An abbreviated neurocognitive evaluation was conducted and she was an engaged and cooperative participant. Optimal premorbid abilities were estimated as falling in the high average range of functioning and all of today's results are commensurate with that estimate. Specifically, her performances are fully within normal limits across measures of attention, speed of thinking, language processing, visuospatial abilities, mental flexibility, and verbal and nonverbal learning and memory.  At the present time, there is no evidence of objective cognitive dysfunction. Emotionally, the patient endorsed longstanding depression and anxiety, the symptoms of which were largely well controlled prior to the concussion but were severely exacerbated afterward. Since starting Zyprexa last week and getting better sleep, her anxiety and depression have significantly improved.     Overall, it appears that the patient has likely made a full recovery from her relatively recent mild traumatic brain injury two months ago. This is supported by her intact cognitive performances today, and by the patient's reports of notable symptom improvement over the past two weeks. Her subjective reports of mild concentration issues are most likely secondary to anxiety and depression symptoms, which are also improving nicely with medications. At the end of the session, I met with the patient to discuss her experience with testing, review the results, provide feedback and education, and discuss my recommendations moving forward.    DIAGNOSTIC IMPRESSIONS:  Mild Traumatic Brain Injury, likely resolved  Major Depressive Disorder, Recurrent, Mild (per history)  Generalized Anxiety Disorder (per history)     RECOMMENDATIONS:  1) Time was spent discussing the pathophysiology of concussion injury, normalizing the patient's experience, and providing education  "about the typical recovery trajectory.    2) We discussed how physical, emotional, and cognitive symptoms are highly interconnected and influence one another. Education was provided on the impact of emotional distress and how that can cause or exacerbate cognitive issues and physical symptoms (e.g., headache, nausea, light/noise sensitivity, etc.) in daily life. I reinforced the need for ongoing mental health follow-up. She is already participating in weekly psychotherapy and sees her psychiatrist regularly (their next follow-up is 2/27). We discussed self-care and relaxation strategies that she can employ on her own. Ongoing improvements in her mental health will likely elicit perceived improvements in her cognitive efficiency over time.    3) We discussed the benefits of compensatory and organizational strategies to optimize her functioning in daily life (i.e., lists, calendar system, pillbox, etc.). We also reviewed the importance of taking rest breaks after persistent engagement in cognitively and physically demanding tasks. We discussed the rule of 50/10 during which fifty minutes of cognitively demanding tasks are followed by a ten minute break to relax and \"power down\" so to help delay/prevent a flare-up of physical symptoms.      Overall, Ms. Douglas appears to have made a good recovery from her mild traumatic brain injury as demonstrated by her performance on this brief neurocognitive screening evaluation.  At the present time, it does not appear that any further follow-up with this neuropsychologist is needed.  However, if Ms. Douglas should experience any cognitive difficulties or an exacerbation of her functional difficulties in daily life, she is encouraged to contact this clinic again.  Today's results will serve as a baseline for future comparison.    Thank you for the opportunity to assist in the evaluation and care of Ms. Douglas. Please do not hesitate to contact me with any questions regarding my " findings or recommendations.      Jaida Gonzales PsyD, LP  Licensed Clinical Neuropsychologist  [facility] [place] [address]  Phone: [phone]

## 2021-06-05 NOTE — PROGRESS NOTES
Physical Therapy  Therapy Initial Plan of Care      Medical Diagnosis: s/p concussion          Treatment Dx: HA, neck pain, poor posture  Referring MD: Charu Guerrero FNP  Onset date: 12/02/2019     Start of Care: 01/24/2020      Assessment:  Pt is a 45 y/o female who has a referral from RON [place], for a concussion she sustained on 12/02/2019. Pt was walking and slipped on the ice striking the back of her head. Pt's primary complaints are dizziness, HA, neck pain, and increased anxiety since the incident in question. These symptoms have resulted in increased difficulty caring for her children, driving, and exercising for general health and fitness. Today's evaluation reveals reduce cervical AROM with pain, increase muscle tone in cervical musculature with soft tissue assessment, poor recruitment of cervical stabilizing musculature with deep neck flexor endurance testing, and poor sitting posture. Pt is currently seeking different forms of massage therapy help manage soft tissue restrictions; she believes this has helped. However, she reports that her symptoms are only managed for brief periods and is relying on these interventions for short term relief and would like to learn how to self manage these symptoms on her own for long term relief. Due to the lack of cervical musculoskeletal strengthening exercises initiated at current massage therapy, the patient would benefit from skilled 1:1 PT to address found strength/ROM and motor control impairments of her cervical spine and poor posture, in order to reduce her neck pain and HA to facilitate return to daily exercise and caring for her children with greater ease.     Prognostic Indicators:  Rehabilitation potential: Excellent. Pt seems motivated to engage in therapy and to learn how to self-manage symptoms for long term pain relief     Impairment:  Range of Motion, Motor Function, Dizziness and Pain    Functional Goals:  to be met by 4 treatment visits    1) Pt will report max neck pain at < 3/10 in order to tolerate driving with greater ease.  2) Pt will report max HA intensity at < 3/10 in order to care for her children with greater ease.  3)Pt will demonstrate a 10% improvement in NDI in order to participate in daily exercise routine with less discomfort for general health and fitness.   4)Pt will demonstrate independence with HEP to self manage symptoms.     Plan of Care:  Paitent/Family Instruction: Treatment plan/rationale, home exercise program, expected functional outcome., Therapeutic Exercise, Neuromuscular reeducation and Breathing Exercise Techniques    Frequency / Duration: 1 x/week with decreasing frequency for up to 4 treatment visits    Julio Wade, SPT  1/24/2020   12:23 PM  SPT under direct supervision of PT with PT directing treatment and plan of care.    Gonsalo Fields, PT, DPT, NCS      Physician Recommendation:  1. I certify the need for these services furnished within this plan and while under my care. I agree with the therapist's recommendation for plan of care.    2. If there is any recommendation for modification of therapy plan, please indicate below.      Physician's Signature (Printed):  _____________________________

## 2021-06-06 NOTE — PROGRESS NOTES
Physical Therapy  Physical Therapy Daily Outpatient Documentation        Rx Units: 3 - TE    Total OP Minutes: 45   Treatment #: 2/4    Pain: 3/10, 0/10  Location: neck pain, headache  Intervention: see below    Subjective: Patient reports improvements in her neck pain and compliance to her HEP.  She is unsure if she has been performing her exercises correctly.     Therapeutic Exercise  Total Minutes: 45  In order to reduce neck pain and address mm tone:  -NuStep x 6 minutes   Level #4 Ave METs: 2.8  Ave SPM: 95  In order to address muscle weakness and imbalance for posture and pain management:  - Supine chin tucks with towel roll x 10 reps with 15 second isometric holds - VC for 50% contraction and place tongue to roof of mouth to facilitate DNFs  - Supine chin tucks with isometric extension x 10 reps with 10 seconds holds - VC for 50% isometric contraction to avoid overuse  - Seated upper trap stretch x 30 seconds x 1 reps each side - VC for proper technique  - Seated levator scapula stretch x 30 seconds x 1 reps each side - VC for proper technique  - Seated SCM/scalene stretch x 30 seconds x 1 reps each side - VC for proper hand placement and technique  In order to increase proximal muscle engagement/recruitment for posture and balance control:  -Hook lying TA contraction x 10 reps with 15 second holds - VC for 50% contraction and drawing in belly button.  -Hook lying TA contraction with alt LE lift x 10 reps x 2 sets - VC for TA contraction and small marching movement of legs  In order to improve scapular control to reduce neck pain and address posture:  -Prone scap retraction/depression B x 5 reps - VC and TC for increased scapular depression and avoid overuse of upper trap  -Prone scap retraction/depression with arm lift in I position x 10 reps  B- minor VC for increased scap depression  -Prone scap retraction/depression with arm in W position x 10 reps - improved scapular movement this date. Pt able to lift  arms x 5 reps  -Seated scap retraction/depression B x 8 reps - VC for 50% contraction  -Standing scap retraction with orange theraband x 10 reps - VC for increased scapular depression, TA engagement and chin tuck. Pt tends to over recruit upper traps at end of ROM therefore cues for reduced tension  -Corner pec major stretch x 30 seconds  - good form  -Corner pec minor stretch x 30 seconds - good form  -Pt issued updated written HEP and all questions addressed.     Written HEP:  -chin tucks  -UT stretch  -levator scap stretch  -SCM stretch  -Pec major/minor stretches  -SL ER  -prone I lift  -seated scap set        Assessment/Plan for week of 2/10/2020-2/14/2020:  Patient reports improvements in her symptoms. She was able to tolerance advancement in cervical strengthening exercises and benefited from cues for appropriate percent of contraction to avoid over recruitment of accessory muscles. Will reduce frequency to every other week. Plan to continue advancing postural stabilizing program: bridging, quadruped and chin tuck with head lift.        Additional Notes:  Goals:  1) Pt will report max neck pain at < 3/10 in order to tolerate driving with greater ease.  2) Pt will report max HA intensity at < 3/10 in order to care for her children with greater ease.  3)Pt will demonstrate a 10% improvement in NDI in order to participate in daily exercise routine with less discomfort for general health and fitness.   4)Pt will demonstrate independence with HEP to self manage symptoms.

## 2021-06-06 NOTE — PROGRESS NOTES
Assessment:     1. Post Concussion Syndrome  2. Post Concussion Headache  3. Anxiety d/t concussion    Discussed: RED FLAGS NEEDING ACUTE EMERGENCY MANAGEMENT:                          Headaches that worsen                          Seizures                          Focal Neurologic Signs                          Drowsiness/Lethargy                          Repeated vomiting                          Slurred Speech                          Inability to recognize people/places                          Increasing confusion/irritability                          Weakness/numbness                          Neck pain                          Unusual behavioral change                          Change in level of consciousness    The patient returns to the concussion clinic for a follow up visit, She was last seen by me on 1/14/2020, where no medication changes were made.  The patient reports great improvement in all of her concussive symptoms.  She reports that her legs have resolved, she does not believe she has had a headache for at least 3 weeks.  Overall patient is denying any cognitive concussive symptoms.  Reports that her physical and emotional concussive symptoms have improved greatly.  Patient will be discharged from clinic all care to be transferred back to primary.  Patient is return if she has any return or worsening of symptoms, another concussion, problems or concerns.      Plan:     Ordered today: Discharge from concussion clinic    Neuropsychological assessment completed    Yes   Pain control for headaches - Tylenol only due to rebound headaches, Maliha/blue tinted glasses  Currently doing PT  Yes   Completed No   Currently doing OT  No   Completed No    Currently doing ST   No   Completed No   Psychology  No      MRI  Completed Yes   Labs   Completed No      Any new medication (other provider):   No   Meds started at last appointment  No   Meds increased at last appointment    No     Sleeping Problems-monitor,  "sleep hygiene          Currently on medication  No     Anxiety due to concussion - monitor        Currently on medication  Yes, Lexapro      Decreased concentration and focus - monitor        Currently on medication No        Need work/school note written today   No     Is patient on a controlled substance   No     Subjective:          HPI    On Monday, 12/2/2019, the patient was walking from the grocery store to her car when she slipped on ice. She fell backwards and landed on her back, hitting her head on the ground. She did not lose consciousness and denied posttraumatic amnesia. She felt stunned and immediately experienced jaw and neck pain and felt as though she was unsafe to move. Fortunately, there was a fire department right across the street, and several fire fighters came to her assistance after they witnessed her fall. They put a collar on her neck and assessed her. They determined she would not need an ambulance to get to the ED, so her  came and brought her to [facility]. There, she reported dizziness, jaw pain, headache, slight nausea, slight photophobia, neck pain, tailbone pain, and shoulder pain. She underwent a head CT, which was negative for any acute intracranial abnormalities. They prescribed Zofran for nausea and diagnosed her with a head injury. She went to a chiropractor the following day, who put her jaw back into alignment. The patient reported that she felt \"okay\" for a few days after that; however, on Friday she began experiencing a significant exacerbation in symptoms, particularly vertigo and significant exacerbation of depression and anxiety. She saw her PCP on 12/9, who referred her to the Concussion Clinic for ongoing management of her concussion symptoms. The patient has also since been treated with acupuncture and cranial sacral massage therapy. 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     The patient was initially evaluated in the Concussion Clinic by me on " 1/14/2020. Due to ongoing physical and cognitive symptoms, the patient was referred to physical therapy and for neuropsychological evaluation.     Date of accident : 12/2/2019                                                       Workman's Comp   No     Headaches:  Significant ongoing headaches No  Headaches: Resolved, no headache in the last month      Physical Symptoms:  Headache-No        Nausea-Yes       Since last visit  Improved       Balance problems - No          Dizziness - Yes          Since last visit  Improved     Visual problems - No   Fatigue - No             Sensitivity to light - No      Sensitivity to sound - No          Numbness/tingling - No            Cognitive Symptoms  Feeling mentally foggy -No       Feeling slowed down -No         Difficulty Concentrating- No       Difficulty remembering - No           Emotional Symptoms  Irritability - Yes         Since last visit  Improved     Sadness-  Yes      Since last visit  Improved     More emotional - Yes      Since last visit  Improved     Nervousness/anxiety -Yes       Since last visit  Improved       Mental Health History:  Anxiety - Yes  Depression - Yes  Sleep Disorders - No  Any thought of hurting self or others currently?   No  Any history of hurting self or others?            No    Sleep History:  Drowsiness- No        Sleep less than usual - No  Sleep more than usual - No  Trouble falling asleep - No       Does the patient wake feeling rested - Yes         Migraine Headaches      Patient history of migraines.    No      Exertion:         Do the above stated symptoms worsen with physical activity? No               Do the above stated symptoms worsen with cognitive activity? No      Work/School        Do the above stated symptoms worsen with school/work?        Yes        Have your returned to work/school? Yes            There are no active problems to display for this patient.    No past medical history on file.  No past surgical history on  file.  No family history on file.  Current Outpatient Medications   Medication Sig Dispense Refill     escitalopram oxalate (LEXAPRO) 10 MG tablet Take 10 mg by mouth.       levothyroxine (SYNTHROID, LEVOTHROID) 112 MCG tablet Take 112 mcg by mouth.       No current facility-administered medications for this encounter.        No Known Allergies  Social History     Socioeconomic History     Marital status:      Spouse name: Not on file     Number of children: Not on file     Years of education: Not on file     Highest education level: Not on file   Occupational History     Not on file   Social Needs     Financial resource strain: Not on file     Food insecurity:     Worry: Not on file     Inability: Not on file     Transportation needs:     Medical: Not on file     Non-medical: Not on file   Tobacco Use     Smoking status: Never Smoker   Substance and Sexual Activity     Alcohol use: Not on file     Drug use: Not on file     Sexual activity: Not on file   Lifestyle     Physical activity:     Days per week: Not on file     Minutes per session: Not on file     Stress: Not on file   Relationships     Social connections:     Talks on phone: Not on file     Gets together: Not on file     Attends [employer] service: Not on file     Active member of club or organization: Not on file     Attends meetings of clubs or organizations: Not on file     Relationship status: Not on file     Intimate partner violence:     Fear of current or ex partner: Not on file     Emotionally abused: Not on file     Physically abused: Not on file     Forced sexual activity: Not on file   Other Topics Concern     Not on file   Social History Narrative     Not on file       The following portions of the patient's history were reviewed and updated as appropriate: allergies, current medications, past family history, past medical history, past social history, past surgical history and problem list.    Review of Systems  A comprehensive review of  systems was negative except for: What is noted above    Objective:       Discussion was held with the patient today regarding concussion in general including types of injury, symptoms that are common, treatment and variability in time to recover. I also provided written information about concussion and symptoms that would apply to her in her concussion folder. Education about concussion symptoms and length of time it would take the patient to recover was also given to the patient.  I have reassured the patient her symptoms are very common when a concussion is present and will improve with time. We discussed the risks and benefits of the medication including risk of worsening depression with medication adjustments and even the possibility of emergence of suicidal ideations.       Total time spent with the patient today was 30 minutes with greater than 50% of the time spent in counseling and care coordination. The patient will return to this clinic for further assessment and treatment if she has any worsening or return of symptoms or another concussion. She agrees to call before then with any questions, concerns or problems. We will assess for the appropriateness of possible psychotropic medication trials/changes. The patient will seek out appropriate emergency services should that become necessary.I will be available if any questions, concerns, or problems that arise.     Mental Status Examination  Patient is casually dressed and seated for evaluation. She is cooperative with questioning and eye contact is good. She is fully engaged in conversation today. She is alert and fully oriented. Speech is normal. Thought processes normal with normal prehension and expression. Thoughts are organized and linear. Content is pertinent to the conversation and without evidence of auditory or visual hallucinations. No delusional ideation. Affect/mood is euthymic-bright, even. Gen. fund of knowledge, insight and memory are normal.

## 2021-06-09 ENCOUNTER — MYC MEDICAL ADVICE (OUTPATIENT)
Dept: FAMILY MEDICINE | Facility: CLINIC | Age: 46
End: 2021-06-09

## 2021-06-10 NOTE — TELEPHONE ENCOUNTER
Writer responded via [employer].    BILL PinedoN, RN  [facility]

## 2021-06-10 NOTE — TELEPHONE ENCOUNTER
Writer responded via [employer].    BILL PinedoN, RN  [facility]

## 2021-06-28 NOTE — PROGRESS NOTES
"Progress Notes by Julio Wade PT Student at 1/24/2020  8:00 AM     Author: Julio Wade PT Student Service: -- Author Type: PT Student    Filed: 1/24/2020 12:23 PM Date of Service: 1/24/2020  8:00 AM Status: Attested    : Julio Wade PT Student (PT Student) Cosigner: Gonsalo Fields PT at 1/24/2020  2:29 PM    Attestation signed by Gonsalo Fields PT at 1/24/2020  2:29 PM    SPT under direct supervision of PT with PT directing treatment and plan of care.    Gonsalo Fields PT, DPT, NCS                Physical Therapy  PHYSICAL THERAPY INITIAL CONCUSSION ASSESSMENT    Date: 1/24/2020                        Date of Injury: 12/02/2019  Subjective: Pt is a 45 y/o female who presents to [place] for a referral following a concussion she sustained on 12/02/2019. Mechanism of injury is described below. Reports she did not lose consciousness and denies retrograde/anterograde amnesia. The day of her injury she went to the emergency room due to a headache and wanted to get \"checked out\". ER did a CT scan which was \"negative for a brain bleed\". A few days after the injury she became really dizzy, describes this as \"true vertigo, the room was spinning.\" Reports she rolled over in bed when this first occured. A few weeks after the incident she went to the emergency room due to back spasms in her right lower back. ER diagnosed her with a back spasm and prescribed a muscle relaxant and to follow up with PCP. PCP prescribed a different muscle relaxant to avoid sedative effects. Today these back spasms are not present. Pt reports she is here regarding ongoing dizziness, neck pain, and HA.    Mechanism of Injury:  Per chart review; sustained a blunt/closed head injury while at the grocery store on 12/2/19.  She was walking back to her car from the grocery store, she slipped on ice, and fell backwards hitting her head on the ice.     Previous level of function:  Pt lives with her  and " "two kids, 9 and 12 years of age. 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 Reports that she was exercising 5x/week with a . Working on strength training and some cardiovascular exercises.     Current level of function:  Reports that taking care of her children has been more difficult. She is easily fatigued, tired, and gets headaches throughout the week.     Living situation:  Lives in a home with her  and two kids.     Ongoing symptoms  Dizziness, Headaches, and neck tightness. Her anxiety has \"spiked\" since the incident. She mentions a history of anxiety but was well managed with exercise and relaxation.    Patient Goal for Physical Therapy: Reduce her neck pain, HA, and dizziness and to develop exercises for self management of symptoms.     Headaches:     -Frequency:  1x week    -Location:  Temporal area that radiates to the crown of the head.    -Description of pain:  Ache, very mild    -Exacerbating factors:  Noise   -Relieving factors:  Aleve, when her headaches do not go away with rest. Has also been seeing a cranial-sacral massage therapist, and doing massage therapy   -Headache history:  No    -Current:  0/10, Best:  0/10, Worst:  6/10    Neck Pain:   -Frequency:  Every other day      -Location:  Sub-occipital area    -Description of pain:  Tension    -Exacerbating factors:  Driving for longer periods, increased time sitting   -Relieving factors:  Cranial sacral massage, and massage therapy.    -Neck Pain history:  No    -Are you seeing a chiropractor?:  5-6x right after her injury, not seeing her chiropractor now.    -Current:  0/10, Best:  0/10, Worst:  6/10     Dizziness:   -Frequency:  1-2x/week    -Description of dizziness without using the word \"dizzy\":  \"Inside of my head is spinning and my skull is still\", room is spinning for a brief moment.    -Exacerbating factors:  Rolling in bed, scrolling through her phone.    -Relieving factors:  Being still and not moving   -Dizziness history:  8 years ago she " was diagnosed with vertigo by an ENT. Did not receive treatment and was educated that it should get better with time    -Current:  0/10, Best:  0/10, Worst:  6/10    Balance:   -When do you lose balance?:  Had some balance issues a few weeks ago but is now not present   -Recent falls:  Has not fallen but has had to put her hand on the wall to steady herself.      Pain rating: refer above  Location: refer above   Other information/data:   *Deep neck flexor endurance test: 24 sec. Pt was able sustain position but demonstrates accessory muscle activation of SCM to maintain position.    ROM: Cervical       Flexion: WNL - displays cautious behavior thorughout ROM       Extension: WNL - displays cautious behavior thorughout ROM      Right Rotation: WNL - displays cautious behavior thorughout ROM       Left Rotation: WNL - displays cautious behavior thorughout ROM     Right Side Bendin deg, no pain       Left Side Bendin deg, pain felt on the L lateral neck, described as tightness.         Cervical and Thoracic Segmental Mobility/Other   *Segmental mobility in C-Spine - good mobility, firm end feel in all cervical vertebrae  *Increased resting tone in R and L upper trapezius in mid cervical area near insertion  *Increased resting tone in R>L scalenes       Postural Assessment:   *Forward head posture and rounded shoulders in sitting.       Vestibular/Balance  Gait:Normal      Vertebral Basilar Artery: Normal   Ocular AROM: Normal  Smooth Pursuit: Normal  Saccades: Normal    Positional Tests: R Jeremi-Hallpike Normal                                              L Jeremi--Hallpike Normal         R Horizontal Canal: Normal         L Horizontal Canal: Normal         Convergence: 5 cm  VOR Cancellation: Normal  Slow VOR:Normal   Right Head Impulse Test: Normal  Left Head Impulse Test: Normal     Sensory Organization Tests:  MCTSIB: NSEO Not Tested due to time contraint and lack of complaint of imbalance       PSEO Not  tested due to time constraint and lack of complaints of imbalance       NSEC Not tested due to time constraint and lack of complaints of imbalance                  PSEC Not tested due to time constraint and lack of complaints of imbalance    (Functional Gait Assessment) FGA: Not Tested    Single Leg Stance: Not Tested        Initial Treatment: Not tested     Therapist Recommendations:  Impairments identified; See POC for goals and scheduled for subsequent visits      Rx Units Low Eval  Total Minutes: 60

## 2021-07-03 NOTE — ADDENDUM NOTE
Addendum Note by Lianne Green CMA at 1/14/2020  2:00 PM     Author: Lianne Green CMA Service: -- Author Type: Certified Medical Assistant    Filed: 1/15/2020  9:39 AM Date of Service: 1/14/2020  2:00 PM Status: Signed    : Lianne Green CMA (Certified Medical Assistant)    Encounter addended by: Lianne Green CMA on: 1/15/2020  9:39 AM      Actions taken: Charge Capture section accepted

## 2021-09-04 ENCOUNTER — HEALTH MAINTENANCE LETTER (OUTPATIENT)
Age: 46
End: 2021-09-04

## 2021-11-09 ENCOUNTER — IMMUNIZATION (OUTPATIENT)
Dept: FAMILY MEDICINE | Facility: CLINIC | Age: 46
End: 2021-11-09
Payer: COMMERCIAL

## 2021-11-09 PROCEDURE — 90471 IMMUNIZATION ADMIN: CPT

## 2021-11-09 PROCEDURE — 90682 RIV4 VACC RECOMBINANT DNA IM: CPT

## 2021-12-15 ENCOUNTER — IMMUNIZATION (OUTPATIENT)
Dept: NURSING | Facility: CLINIC | Age: 46
End: 2021-12-15
Payer: COMMERCIAL

## 2021-12-15 PROCEDURE — 0064A COVID-19,PF,MODERNA (18+ YRS BOOSTER .25ML): CPT

## 2021-12-15 PROCEDURE — 91306 COVID-19,PF,MODERNA (18+ YRS BOOSTER .25ML): CPT

## 2021-12-23 ENCOUNTER — MYC REFILL (OUTPATIENT)
Dept: FAMILY MEDICINE | Facility: CLINIC | Age: 46
End: 2021-12-23
Payer: COMMERCIAL

## 2021-12-23 DIAGNOSIS — E06.3 HYPOTHYROIDISM DUE TO HASHIMOTO'S THYROIDITIS: ICD-10-CM

## 2021-12-23 RX ORDER — LEVOTHYROXINE SODIUM 125 UG/1
TABLET ORAL
Qty: 30 TABLET | Refills: 0 | Status: CANCELLED | OUTPATIENT
Start: 2021-12-23

## 2022-01-25 ENCOUNTER — VIRTUAL VISIT (OUTPATIENT)
Dept: FAMILY MEDICINE | Facility: CLINIC | Age: 47
End: 2022-01-25
Payer: COMMERCIAL

## 2022-01-25 DIAGNOSIS — E06.3 HYPOTHYROIDISM DUE TO HASHIMOTO'S THYROIDITIS: ICD-10-CM

## 2022-01-25 DIAGNOSIS — F32.0 MILD MAJOR DEPRESSION (H): ICD-10-CM

## 2022-01-25 PROCEDURE — 99214 OFFICE O/P EST MOD 30 MIN: CPT | Mod: 95 | Performed by: PHYSICIAN ASSISTANT

## 2022-01-25 RX ORDER — LEVOTHYROXINE SODIUM 125 UG/1
TABLET ORAL
Qty: 90 TABLET | Refills: 1 | Status: SHIPPED | OUTPATIENT
Start: 2022-01-25 | End: 2022-07-28

## 2022-01-25 NOTE — PROGRESS NOTES
"Deysi is a 46 year old who is being evaluated via a billable video visit.      How would you like to obtain your AVS? MyChart  If the video visit is dropped, the invitation should be resent by: Text to cell phone: [phone]  Will anyone else be joining your video visit? No      Video Start Time: 1:23 PM    Assessment & Plan     Hypothyroidism due to Hashimoto's thyroiditis  Stable at current dose. No issues taking the medication. Will need repeat thyroid labs - will fill for 6 months. Patient due for lipids, routine labs, etc - discussed coming in for physical which she plans to do. I discussed with the patient risks and benefits of the new medication prescribed including potential side effects.  The patient had opportunity to ask questions and is comfortable with and interested in medications as prescribed.    - levothyroxine (SYNTHROID/LEVOTHROID) 125 MCG tablet; TAKE 1 TABLET(125 MCG) BY MOUTH DAILY  - TSH with free T4 reflex; Future    Mild major depression (H)  Takes lexapro.                BMI:   Estimated body mass index is 32.16 kg/m  as calculated from the following:    Height as of 5/25/21: 1.607 m (5' 3.25\").    Weight as of 5/25/21: 83 kg (183 lb).   Weight management plan: Discussed healthy diet and exercise guidelines        Return in about 2 months (around 3/25/2022) for Routine preventive.    Annia Pineda PA-C  [facility] Jo is a 46 year old who presents for the following health issues     HPI     Hypothyroidism Follow-up      Since last visit, patient describes the following symptoms: Weight stable, no hair loss, no skin changes, no constipation, no loose stools      How many servings of fruits and vegetables do you eat daily?  4 or more    On average, how many sweetened beverages do you drink each day (Examples: soda, juice, sweet tea, etc.  Do NOT count diet or artificially sweetened beverages)?   0    How many days per week do you exercise enough " to make your heart beat faster? 4    How many minutes a day do you exercise enough to make your heart beat faster? 60 or more    How many days per week do you miss taking your medication? 0    Stable. Feeling fine on current dose. No concerns with taking the medication.           Review of Systems   Constitutional, HEENT, cardiovascular, pulmonary, gi and gu systems are negative, except as otherwise noted.      Objective           Vitals:  No vitals were obtained today due to virtual visit.    Physical Exam   GENERAL: Healthy, alert and no distress  EYES: Eyes grossly normal to inspection.  No discharge or erythema, or obvious scleral/conjunctival abnormalities.  RESP: No audible wheeze, cough, or visible cyanosis.  No visible retractions or increased work of breathing.    SKIN: Visible skin clear. No significant rash, abnormal pigmentation or lesions.  NEURO: Cranial nerves grossly intact.  Mentation and speech appropriate for age.  PSYCH: Mentation appears normal, affect normal/bright, judgement and insight intact, normal speech and appearance well-groomed.                Video-Visit Details    Type of service:  Video Visit    Video End Time:1:40 PM    Originating Location (pt. Location): Home    Distant Location (provider location):  [facility]     Platform used for Video Visit: [employer] Player

## 2022-03-18 ENCOUNTER — OFFICE VISIT (OUTPATIENT)
Dept: FAMILY MEDICINE | Facility: CLINIC | Age: 47
End: 2022-03-18
Payer: COMMERCIAL

## 2022-03-18 VITALS
OXYGEN SATURATION: 97 % | HEART RATE: 77 BPM | WEIGHT: 182.6 LBS | SYSTOLIC BLOOD PRESSURE: 113 MMHG | BODY MASS INDEX: 30.42 KG/M2 | TEMPERATURE: 97.8 F | DIASTOLIC BLOOD PRESSURE: 69 MMHG | HEIGHT: 65 IN

## 2022-03-18 DIAGNOSIS — E06.3 HYPOTHYROIDISM DUE TO HASHIMOTO'S THYROIDITIS: ICD-10-CM

## 2022-03-18 DIAGNOSIS — Z00.00 ROUTINE GENERAL MEDICAL EXAMINATION AT A HEALTH CARE FACILITY: Primary | ICD-10-CM

## 2022-03-18 DIAGNOSIS — Z13.220 SCREENING FOR HYPERLIPIDEMIA: ICD-10-CM

## 2022-03-18 DIAGNOSIS — F33.42 RECURRENT MAJOR DEPRESSIVE DISORDER, IN FULL REMISSION (H): ICD-10-CM

## 2022-03-18 DIAGNOSIS — Z11.59 NEED FOR HEPATITIS C SCREENING TEST: ICD-10-CM

## 2022-03-18 DIAGNOSIS — R06.83 SNORING: ICD-10-CM

## 2022-03-18 DIAGNOSIS — Z12.11 SCREEN FOR COLON CANCER: ICD-10-CM

## 2022-03-18 DIAGNOSIS — M25.50 MULTIPLE JOINT PAIN: ICD-10-CM

## 2022-03-18 LAB
ALBUMIN SERPL-MCNC: 3.5 G/DL (ref 3.4–5)
ALP SERPL-CCNC: 61 U/L (ref 40–150)
ALT SERPL W P-5'-P-CCNC: 27 U/L (ref 0–50)
ANION GAP SERPL CALCULATED.3IONS-SCNC: 4 MMOL/L (ref 3–14)
AST SERPL W P-5'-P-CCNC: 12 U/L (ref 0–45)
BILIRUB SERPL-MCNC: 0.4 MG/DL (ref 0.2–1.3)
BUN SERPL-MCNC: 14 MG/DL (ref 7–30)
CALCIUM SERPL-MCNC: 8.3 MG/DL (ref 8.5–10.1)
CHLORIDE BLD-SCNC: 109 MMOL/L (ref 94–109)
CHOLEST SERPL-MCNC: 229 MG/DL
CO2 SERPL-SCNC: 27 MMOL/L (ref 20–32)
CREAT SERPL-MCNC: 0.82 MG/DL (ref 0.52–1.04)
CRP SERPL-MCNC: <2.9 MG/L (ref 0–8)
DEPRECATED CALCIDIOL+CALCIFEROL SERPL-MC: 32 UG/L (ref 20–75)
ERYTHROCYTE [DISTWIDTH] IN BLOOD BY AUTOMATED COUNT: 12.9 % (ref 10–15)
ERYTHROCYTE [SEDIMENTATION RATE] IN BLOOD BY WESTERGREN METHOD: 9 MM/HR (ref 0–20)
FASTING STATUS PATIENT QL REPORTED: YES
GFR SERPL CREATININE-BSD FRML MDRD: 87 ML/MIN/1.73M2
GLUCOSE BLD-MCNC: 96 MG/DL (ref 70–99)
HCT VFR BLD AUTO: 38.9 % (ref 35–47)
HCV AB SERPL QL IA: NONREACTIVE
HDLC SERPL-MCNC: 57 MG/DL
HGB BLD-MCNC: 12.8 G/DL (ref 11.7–15.7)
LDLC SERPL CALC-MCNC: 157 MG/DL
MCH RBC QN AUTO: 30.7 PG (ref 26.5–33)
MCHC RBC AUTO-ENTMCNC: 32.9 G/DL (ref 31.5–36.5)
MCV RBC AUTO: 93 FL (ref 78–100)
NONHDLC SERPL-MCNC: 172 MG/DL
PLATELET # BLD AUTO: 239 10E3/UL (ref 150–450)
POTASSIUM BLD-SCNC: 4 MMOL/L (ref 3.4–5.3)
PROT SERPL-MCNC: 6.7 G/DL (ref 6.8–8.8)
RBC # BLD AUTO: 4.17 10E6/UL (ref 3.8–5.2)
SODIUM SERPL-SCNC: 140 MMOL/L (ref 133–144)
TRIGL SERPL-MCNC: 75 MG/DL
TSH SERPL DL<=0.005 MIU/L-ACNC: 0.58 MU/L (ref 0.4–4)
WBC # BLD AUTO: 5.3 10E3/UL (ref 4–11)

## 2022-03-18 PROCEDURE — 84443 ASSAY THYROID STIM HORMONE: CPT | Performed by: PHYSICIAN ASSISTANT

## 2022-03-18 PROCEDURE — 85652 RBC SED RATE AUTOMATED: CPT | Performed by: PHYSICIAN ASSISTANT

## 2022-03-18 PROCEDURE — 85027 COMPLETE CBC AUTOMATED: CPT | Performed by: PHYSICIAN ASSISTANT

## 2022-03-18 PROCEDURE — 99214 OFFICE O/P EST MOD 30 MIN: CPT | Mod: 25 | Performed by: PHYSICIAN ASSISTANT

## 2022-03-18 PROCEDURE — 36415 COLL VENOUS BLD VENIPUNCTURE: CPT | Performed by: PHYSICIAN ASSISTANT

## 2022-03-18 PROCEDURE — 86140 C-REACTIVE PROTEIN: CPT | Performed by: PHYSICIAN ASSISTANT

## 2022-03-18 PROCEDURE — 86803 HEPATITIS C AB TEST: CPT | Performed by: PHYSICIAN ASSISTANT

## 2022-03-18 PROCEDURE — 86431 RHEUMATOID FACTOR QUANT: CPT | Performed by: PHYSICIAN ASSISTANT

## 2022-03-18 PROCEDURE — 80053 COMPREHEN METABOLIC PANEL: CPT | Performed by: PHYSICIAN ASSISTANT

## 2022-03-18 PROCEDURE — 82306 VITAMIN D 25 HYDROXY: CPT | Performed by: PHYSICIAN ASSISTANT

## 2022-03-18 PROCEDURE — 80061 LIPID PANEL: CPT | Performed by: PHYSICIAN ASSISTANT

## 2022-03-18 PROCEDURE — 99396 PREV VISIT EST AGE 40-64: CPT | Performed by: PHYSICIAN ASSISTANT

## 2022-03-18 ASSESSMENT — ENCOUNTER SYMPTOMS
DYSURIA: 0
NERVOUS/ANXIOUS: 0
HEARTBURN: 0
HEMATURIA: 0
MYALGIAS: 0
ABDOMINAL PAIN: 0
DIZZINESS: 0
CONSTIPATION: 0
HEMATOCHEZIA: 0
CHILLS: 0
SHORTNESS OF BREATH: 0
PARESTHESIAS: 0
HEADACHES: 0
DIARRHEA: 0
NAUSEA: 0
WEAKNESS: 0
JOINT SWELLING: 0
FREQUENCY: 0
PALPITATIONS: 0
ARTHRALGIAS: 1
FEVER: 0
SORE THROAT: 0
BREAST MASS: 0
COUGH: 0
EYE PAIN: 0

## 2022-03-18 ASSESSMENT — PATIENT HEALTH QUESTIONNAIRE - PHQ9
SUM OF ALL RESPONSES TO PHQ QUESTIONS 1-9: 0
SUM OF ALL RESPONSES TO PHQ QUESTIONS 1-9: 0
10. IF YOU CHECKED OFF ANY PROBLEMS, HOW DIFFICULT HAVE THESE PROBLEMS MADE IT FOR YOU TO DO YOUR WORK, TAKE CARE OF THINGS AT HOME, OR GET ALONG WITH OTHER PEOPLE: NOT DIFFICULT AT ALL

## 2022-03-18 NOTE — PROGRESS NOTES
SUBJECTIVE:   CC: Deysi Douglas is an 47 year old woman who presents for preventive health visit.       Patient has been advised of split billing requirements and indicates understanding: Yes  Healthy Habits:     Getting at least 3 servings of Calcium per day:  NO    Bi-annual eye exam:  NO    Dental care twice a year:  Yes    Sleep apnea or symptoms of sleep apnea:  Excessive snoring    Diet:  Gluten-free/reduced    Frequency of exercise:  4-5 days/week    Duration of exercise:  45-60 minutes    Taking medications regularly:  Yes    Medication side effects:  None    PHQ-2 Total Score: 0    Dr Sea Slaughter - [facility] ([facility]).   PHQ 10/19/2020 5/25/2021 3/18/2022   PHQ-9 Total Score 1 0 0   Q9: Thoughts of better off dead/self-harm past 2 weeks Not at all Not at all Not at all     Snoring. Both parents have DAVE. Within the last few years this has worsened.  has noted her snoring.    Hand pain. Mostly right index finger. Feels stiff.     Mom has osteoporosis.     Periods regular. But lighter than normal.       Today's PHQ-2 Score:   PHQ-2 ( 1999 [employer]) 3/18/2022   Q1: Little interest or pleasure in doing things 0   Q2: Feeling down, depressed or hopeless 0   PHQ-2 Score 0   PHQ-2 Total Score (12-17 Years)- Positive if 3 or more points; Administer PHQ-A if positive -   Q1: Little interest or pleasure in doing things Not at all   Q2: Feeling down, depressed or hopeless Not at all   PHQ-2 Score 0       Abuse: Current or Past (Physical, Sexual or Emotional) - No  Do you feel safe in your environment? Yes    Have you ever done Advance Care Planning? (For example, a Health Directive, POLST, or a discussion with a medical provider or your loved ones about your wishes): Yes, patient states has an Advance Care Planning document and will bring a copy to the clinic.    Social History     Tobacco Use     Smoking status: Never Smoker     Smokeless tobacco: Never Used   Substance Use  Topics     Alcohol use: No     Alcohol/week: 0.0 standard drinks     If you drink alcohol do you typically have >3 drinks per day or >7 drinks per week? No    Alcohol Use 3/18/2022   Prescreen: >3 drinks/day or >7 drinks/week? No   Prescreen: >3 drinks/day or >7 drinks/week? -       Reviewed orders with patient.  Reviewed health maintenance and updated orders accordingly - Yes  BP Readings from Last 3 Encounters:   03/18/22 113/69   05/25/21 112/62   10/19/20 114/77    Wt Readings from Last 3 Encounters:   03/18/22 82.8 kg (182 lb 9.6 oz)   05/25/21 83 kg (183 lb)   10/19/20 77.8 kg (171 lb 9.6 oz)                    Breast Cancer Screening:    Breast CA Risk Assessment (FHS-7) 3/18/2022   Do you have a family history of breast, colon, or ovarian cancer? No / Unknown         Mammogram Screening: Recommended annual mammography  Pertinent mammograms are reviewed under the imaging tab.    History of abnormal Pap smear:   YES - updated in Problem List and Health Maintenance accordingly  Last 3 Pap Results:   PAP (no units)   Date Value   05/25/2021 ASC-US (A)   02/11/2014 NIL   02/08/2012 NIL     PAP / HPV Latest Ref Rng & Units 5/25/2021 2/11/2014 2/8/2012   PAP (Historical) - ASC-US(A) NIL NIL   HPV16 NEG:Negative Negative - -   HPV18 NEG:Negative Negative - -   HRHPV NEG:Negative Negative - -     Reviewed and updated as needed this visit by clinical staff   Tobacco  Allergies  Meds              Reviewed and updated as needed this visit by Provider                     Review of Systems   Constitutional: Negative for chills and fever.   HENT: Negative for congestion, ear pain, hearing loss and sore throat.    Eyes: Negative for pain and visual disturbance.   Respiratory: Negative for cough and shortness of breath.    Cardiovascular: Negative for chest pain, palpitations and peripheral edema.   Gastrointestinal: Negative for abdominal pain, constipation, diarrhea, heartburn, hematochezia and nausea.   Breasts:   "Negative for tenderness, breast mass and discharge.   Genitourinary: Negative for dysuria, frequency, genital sores, hematuria, pelvic pain, urgency, vaginal bleeding and vaginal discharge.   Musculoskeletal: Positive for arthralgias. Negative for joint swelling and myalgias.   Skin: Negative for rash.   Neurological: Negative for dizziness, weakness, headaches and paresthesias.   Psychiatric/Behavioral: Negative for mood changes. The patient is not nervous/anxious.           OBJECTIVE:   /69 (BP Location: Right arm, Patient Position: Chair, Cuff Size: Adult Regular)   Pulse 77   Temp 97.8  F (36.6  C) (Oral)   Ht 1.66 m (5' 5.35\")   Wt 82.8 kg (182 lb 9.6 oz)   SpO2 97%   Breastfeeding No   BMI 30.06 kg/m    Physical Exam  GENERAL: healthy, alert and no distress  EYES: Eyes grossly normal to inspection, PERRL and conjunctivae and sclerae normal  HENT: ear canals and TM's normal, nose and mouth without ulcers or lesions  NECK: no adenopathy, no asymmetry, masses, or scars and thyroid normal to palpation  RESP: lungs clear to auscultation - no rales, rhonchi or wheezes  CV: regular rate and rhythm, normal S1 S2, no S3 or S4, no murmur, click or rub, no peripheral edema and peripheral pulses strong  ABDOMEN: soft, nontender, no hepatosplenomegaly, no masses and bowel sounds normal  MS: no gross musculoskeletal defects noted, no edema  SKIN: no suspicious lesions or rashes  NEURO: Normal strength and tone, mentation intact and speech normal  PSYCH: mentation appears normal, affect normal/bright    Diagnostic Test Results:  Labs reviewed in Epic    ASSESSMENT/PLAN:   1. Routine general medical examination at a health care facility  Well adult.  - Vitamin D Deficiency; Future  - Comprehensive metabolic panel (BMP + Alb, Alk Phos, ALT, AST, Total. Bili, TP); Future  - CBC with platelets; Future  - CBC with platelets  - Comprehensive metabolic panel (BMP + Alb, Alk Phos, ALT, AST, Total. Bili, TP)  - Vitamin D " "Deficiency    2. Snoring  New snoring in last few years. Suggest seeing sleep management for further evaluation.  - Adult Sleep Eval & Management  Referral; Future    3. Multiple joint pain  Multiple hand joint pain. Discussed use of voltaren gel for discomfort.   - CRP, inflammation; Future  - ESR: Erythrocyte sedimentation rate; Future  - Rheumatoid factor; Future  - Rheumatoid factor  - ESR: Erythrocyte sedimentation rate  - CRP, inflammation    4. Screen for colon cancer  Screen.  - Adult Gastro Ref - Procedure Only; Future    5. Hypothyroidism due to Hashimoto's thyroiditis  Recheck.   - TSH with free T4 reflex    6. Recurrent major depressive disorder, in full remission (H)  Stable. Sees psychiatry/    7. Need for hepatitis C screening test  Screen.  - Hepatitis C Screen Reflex to HCV RNA Quant and Genotype; Future  - Hepatitis C Screen Reflex to HCV RNA Quant and Genotype    8. Screening for hyperlipidemia  Screen.  - Lipid panel reflex to direct LDL Fasting; Future  - Lipid panel reflex to direct LDL Fasting      Patient has been advised of split billing requirements and indicates understanding: Yes    COUNSELING:  Special attention given to:        Regular exercise       Healthy diet/nutrition       Consider Hep C screening for all patients one time for ages 18-79 years    Estimated body mass index is 30.06 kg/m  as calculated from the following:    Height as of this encounter: 1.66 m (5' 5.35\").    Weight as of this encounter: 82.8 kg (182 lb 9.6 oz).    Weight management plan: Discussed healthy diet and exercise guidelines    She reports that she has never smoked. She has never used smokeless tobacco.      Counseling Resources:  ATP IV Guidelines  Pooled Cohorts Equation Calculator  Breast Cancer Risk Calculator  BRCA-Related Cancer Risk Assessment: FHS-7 Tool  FRAX Risk Assessment  ICSI Preventive Guidelines  Dietary Guidelines for Americans, 2010  USDA's MyPlate  ASA Prophylaxis  Lung CA " Screening    Annia Pineda PA-C  [facility] [place]  Answers for HPI/ROS submitted by the patient on 3/18/2022  If you checked off any problems, how difficult have these problems made it for you to do your work, take care of things at home, or get along with other people?: Not difficult at all  PHQ9 TOTAL SCORE: 0

## 2022-03-18 NOTE — PATIENT INSTRUCTIONS
Voltaren gel for hands.     Take a multivitamin.          Patient Education     Preventive Health Recommendations  Female Ages 40 to 49    Yearly exam:     See your health care provider every year in order to  1. Review health changes.   2. Discuss preventive care.    3. Review your medicines if your doctor prescribed any.      Get a Pap test every three years (unless you have an abnormal result and your provider advises testing more often).      If you get Pap tests with HPV test, you only need to test every 5 years, unless you have an abnormal result. You do not need a Pap test if your uterus was removed (hysterectomy) and you have not had cancer.      You should be tested each year for STDs (sexually transmitted diseases), if you're at risk.     Ask your doctor if you should have a mammogram.      Have a colonoscopy (test for colon cancer) if someone in your family has had colon cancer or polyps before age 50.       Have a cholesterol test every 5 years.       Have a diabetes test (fasting glucose) after age 45. If you are at risk for diabetes, you should have this test every 3 years.    Shots: Get a flu shot each year. Get a tetanus shot every 10 years.     Nutrition:     Eat at least 5 servings of fruits and vegetables each day.    Eat whole-grain bread, whole-wheat pasta and brown rice instead of white grains and rice.    Get adequate Calcium and Vitamin D.      Lifestyle    Exercise at least 150 minutes a week (an average of 30 minutes a day, 5 days a week). This will help you control your weight and prevent disease.    Limit alcohol to one drink per day.    No smoking.     Wear sunscreen to prevent skin cancer.    See your dentist every six months for an exam and cleaning.

## 2022-03-19 ASSESSMENT — PATIENT HEALTH QUESTIONNAIRE - PHQ9: SUM OF ALL RESPONSES TO PHQ QUESTIONS 1-9: 0

## 2022-03-21 LAB — RHEUMATOID FACT SER NEPH-ACNC: <6 IU/ML

## 2022-05-25 ENCOUNTER — VIRTUAL VISIT (OUTPATIENT)
Dept: SLEEP MEDICINE | Facility: CLINIC | Age: 47
End: 2022-05-25
Attending: PHYSICIAN ASSISTANT
Payer: COMMERCIAL

## 2022-05-25 VITALS — BODY MASS INDEX: 32.78 KG/M2 | WEIGHT: 185 LBS | HEIGHT: 63 IN

## 2022-05-25 DIAGNOSIS — R06.83 SNORING: Primary | ICD-10-CM

## 2022-05-25 DIAGNOSIS — E66.09 CLASS 1 OBESITY DUE TO EXCESS CALORIES WITHOUT SERIOUS COMORBIDITY WITH BODY MASS INDEX (BMI) OF 32.0 TO 32.9 IN ADULT: ICD-10-CM

## 2022-05-25 DIAGNOSIS — E66.811 CLASS 1 OBESITY DUE TO EXCESS CALORIES WITHOUT SERIOUS COMORBIDITY WITH BODY MASS INDEX (BMI) OF 32.0 TO 32.9 IN ADULT: ICD-10-CM

## 2022-05-25 DIAGNOSIS — R53.83 OTHER FATIGUE: ICD-10-CM

## 2022-05-25 DIAGNOSIS — F41.1 GAD (GENERALIZED ANXIETY DISORDER): ICD-10-CM

## 2022-05-25 PROBLEM — J39.2 THROAT IRRITATION: Status: RESOLVED | Noted: 2017-10-25 | Resolved: 2022-05-25

## 2022-05-25 PROCEDURE — 99204 OFFICE O/P NEW MOD 45 MIN: CPT | Mod: 95 | Performed by: INTERNAL MEDICINE

## 2022-05-25 ASSESSMENT — SLEEP AND FATIGUE QUESTIONNAIRES
HOW LIKELY ARE YOU TO NOD OFF OR FALL ASLEEP IN A CAR, WHILE STOPPED FOR A FEW MINUTES IN TRAFFIC: WOULD NEVER DOZE
HOW LIKELY ARE YOU TO NOD OFF OR FALL ASLEEP WHILE SITTING INACTIVE IN A PUBLIC PLACE: WOULD NEVER DOZE
HOW LIKELY ARE YOU TO NOD OFF OR FALL ASLEEP WHILE SITTING AND TALKING TO SOMEONE: WOULD NEVER DOZE
HOW LIKELY ARE YOU TO NOD OFF OR FALL ASLEEP WHILE LYING DOWN TO REST IN THE AFTERNOON WHEN CIRCUMSTANCES PERMIT: MODERATE CHANCE OF DOZING
HOW LIKELY ARE YOU TO NOD OFF OR FALL ASLEEP WHILE SITTING AND READING: SLIGHT CHANCE OF DOZING
HOW LIKELY ARE YOU TO NOD OFF OR FALL ASLEEP WHILE WATCHING TV: SLIGHT CHANCE OF DOZING
HOW LIKELY ARE YOU TO NOD OFF OR FALL ASLEEP WHILE SITTING QUIETLY AFTER LUNCH WITHOUT ALCOHOL: WOULD NEVER DOZE
HOW LIKELY ARE YOU TO NOD OFF OR FALL ASLEEP WHEN YOU ARE A PASSENGER IN A CAR FOR AN HOUR WITHOUT A BREAK: SLIGHT CHANCE OF DOZING

## 2022-05-25 NOTE — PROGRESS NOTES
Deysi is a 47 year old who is being evaluated via a billable video visit.      How would you like to obtain your AVS? MyChart  If the video visit is dropped, the invitation should be resent by: Send to e-mail at: [email or web address]  Will anyone else be joining your video visit? No       Damari Link VF    Video Start Time: 8:00 AM  Video-Visit Details    Type of service:  Video Visit    Video End Time:8:31 AM    Originating Location (pt. Location): Home    Distant Location (provider location):  [facility]     Platform used for Video Visit: Retrotope       Chief complaint: Consultation requested by Annia Pineda PA-C for evaluation of snoring, daytime fatigue    History of Present Illness: 47-year-old female describes progressively loud snoring over the last couple of years.  She used to have poor quality sleep with middle the night awakenings and rumination, insomnia.  However treatment for her anxiety with therapy and medication have improved this.  She is not currently taking sleep aids.  She is reporting a lot of daytime fatigue.  She gets has a hard time getting out of bed in the morning due to the alarm.  She needs to hit snooze a lot.  Without the alarm she can sleep another hour and a half or so.  She typically goes to bed around 11 PM. Alarm goes off around 715.      She does get nudged at night due to the snoring.  She has felt herself snorting on occasion.  She thinks this happens when she is on her back.  Otherwise, she denies waking up with sore throat, dry mouth or headaches.  She typically gets up 0-1 time to go to the bathroom.  She does sleep in all positions.      No night sweats or hot flashes.  No bruxism of jaw clenching. No history of nightmares, sleepwalking, sleep talking or dream enactment behavior.  She does tend to remember her dreams a lot.  In general feels her sleep is not restful.    During the day she drinks 1 cup of strong coffee in the morning.   She is not routinely taking naps although she recently did take some naps after experiencing COVID.  She has gained weight in the last few years possibly related to some medication that was initiated for anxiety.  She also has a strong family history of sleep apnea in both her parents.      [place] Sleepiness Scale   Sitting and reading: Slight chance of dozing   Watching TV: Slight chance of dozing   Sitting, inactive in a public place (e.g. a theatre or a meeting): Would never doze   As a passenger in a car for an hour without a break: Slight chance of dozing   Lying down to rest in the afternoon when circumstances permit: Moderate chance of dozing   Sitting and talking to someone: Would never doze   Sitting quietly after a lunch without alcohol: Would never doze   In a car, while stopped for a few minutes in traffic: Would never doze   Total score - [place]: 5   (Less than 10 normal)    Insomnia Severity Scale  DEMARCO Total Score: 6  Total score categories:  0-7 = No clinically significant insomnia   8-14 = Subthreshold insomnia   15-21 = Clinical insomnia (moderate severity)  22-28 = Clinical insomnia (severe)    STOP-BANG  Loud Snore   1  Excessively Tired/Sleepy   1  Observed apnea   0  Hypertension   0  BMI> 35 kg/m2   0  Age >50   0  Neck >16 in/40cm   0  Male Gender   0  Total =   2  (0-2 low, 3-4 intermediate, 5-8 high risk of DAVE)      Past Medical History:   Diagnosis Date     ASCUS of cervix with negative high risk HPV 5/25/2021 2001-2007 NIL annual paps 2010, 2012 NIL paps 2/11/14 NIL pap, neg HPV 5/25/21 ASCUS, neg HPV. Plan: cotest in 3 years     Hypothyroidism      Septate Uterus        Allergies   Allergen Reactions     Shellfish Allergy Anaphylaxis     Peanuts [Nuts] Difficulty breathing     Phenylephrine      Cardiac arrhythmia       Current Outpatient Medications   Medication     escitalopram (LEXAPRO) 10 MG tablet     levothyroxine (SYNTHROID/LEVOTHROID) 125 MCG tablet     ZYRTEC 10 MG PO TABS      valACYclovir (VALTREX) 1000 mg tablet     No current facility-administered medications for this visit.       Social History     Socioeconomic History     Marital status:      Spouse name: Not on file     Number of children: 2     Years of education: Not on file     Highest education level: Not on file   Occupational History     Occupation: Homemaker   Tobacco Use     Smoking status: Never Smoker     Smokeless tobacco: Never Used   Substance and Sexual Activity     Alcohol use: No     Alcohol/week: 0.0 standard drinks     Drug use: No     Sexual activity: Yes     Partners: Male     Birth control/protection: Male Surgical     Comment: vasectomy   Other Topics Concern     Parent/sibling w/ CABG, MI or angioplasty before 65F 55M? No   Social History Narrative    Caffeine intake/servings daily - 0    Calcium intake/servings daily - 3-4    Exercise 3 times weekly - describe walking,biking    Sunscreen used - Yes    Seatbelts used - Yes    Guns stored in the home - No    Self Breast Exam - Yes    Pap test up to date -  Yes    Eye exam up to date -  Not Applicable    Dental exam up to date -  Yes    DEXA scan up to date -  Not Applicable    Flex Sig/Colonoscopy up to date -  Not Applicable    Mammography up to date -  Not Applicable    Immunizations reviewed and up to date - needs TDAP    Abuse: Current or Past (Physical, Sexual or Emotional) - No    Do you feel safe in your environment - Yes    Do you cope well with stress - Yes    Do you suffer from insomnia - Yes    Last updated by: FRANNY MALIK  2/8/2012                                     Social Determinants of Health     Financial Resource Strain: Not on file   Food Insecurity: Not on file   Transportation Needs: Not on file   Physical Activity: Not on file   Stress: Not on file   Social Connections: Not on file   Intimate Partner Violence: Not on file   Housing Stability: Not on file       Family History   Problem Relation Age of Onset     Thyroid  "Disease Mother      Osteoporosis Mother      Depression Father      C.A.D. Father         MI     Lipids Father      Substance Abuse Father      Hyperlipidemia Father      Thyroid Disease Sister      No Known Problems Brother      Thyroid Disease Brother      Osteoporosis Maternal Grandmother      Thyroid Disease Maternal Grandmother      Thyroid Disease Maternal Grandfather      No Known Problems Paternal Grandmother      No Known Problems Paternal Grandfather      No Known Problems Daughter      No Known Problems Daughter      Depression Brother      Substance Abuse Brother      No Known Problems Other      Thyroid Disease Maternal Aunt            EXAM:  Ht 1.6 m (5' 3\")   Wt 83.9 kg (185 lb)   BMI 32.77 kg/m    GENERAL: Alert and no distress  EYES: Eyes grossly normal to inspection.  No discharge or erythema, or obvious scleral/conjunctival abnormalities.  RESP: No audible wheeze, cough, or visible cyanosis.  No visible retractions or increased work of breathing.    SKIN: Visible skin clear. No significant rash, abnormal pigmentation or lesions.  NEURO: Cranial nerves grossly intact.  Mentation and speech appropriate for age.  PSYCH: Mentation appears normal, affect normal, judgement and insight intact, normal speech and appearance well-groomed.       TSH   Date Value Ref Range Status   03/18/2022 0.58 0.40 - 4.00 mU/L Final   03/31/2021 0.55 0.40 - 4.00 mU/L Final       ASSESSMENT:  47-year-old female with history of generalized anxiety disorder, hypothyroidism, significant weight gain, improved insomnia but now with daytime fatigue and loud snoring.  She is at low to intermediate risk for obstructive sleep apnea.  Given her comorbidities and symptoms identifying and treating obstructive sleep apnea is medically indicated.  Differential diagnosis for fatigue could include medication effect.    PLAN:  Recommended home sleep apnea testing.  We reviewed the pathophysiology of obstructive sleep apnea, testing " options and treatment options including CPAP, oral appliances, positional therapy and weight loss.  Patient is agreeable to proceeding.  In the meantime consider using a light blocking mask during sleep as the bedroom is not very dark.  Continue efforts at weight management and other wellness measures.  She is agreeable with this plan.      47 minutes spent on the date of the encounter doing chart review, history and exam, documentation and further activities per the note    Gracia Hawkins M.D.  Pulmonary/Critical Care/Sleep Medicine    [facility]   Floor [address], [address]   [address] [place] [address]. [place] [address]   Appointments: [phone]    The above note was dictated using voice recognition software and may include typographical errors. Please contact the author for any clarifications.

## 2022-05-25 NOTE — LETTER
5/25/2022         RE: Deysi Douglas  [address]  [facility] [address]        Dear Colleague,    Thank you for referring your patient, Deysi Douglas, to the [facility]. Please see a copy of my visit note below.    Deysi is a 47 year old who is being evaluated via a billable video visit.      How would you like to obtain your AVS? MyChart  If the video visit is dropped, the invitation should be resent by: Send to e-mail at: [email or web address]  Will anyone else be joining your video visit? No       LORETTA Pop    Video Start Time: 8:00 AM  Video-Visit Details    Type of service:  Video Visit    Video End Time:8:31 AM    Originating Location (pt. Location): Home    Distant Location (provider location):  [facility]     Platform used for Video Visit: Lighting by LED       Chief complaint: Consultation requested by Annia Pineda PA-C for evaluation of snoring, daytime fatigue    History of Present Illness: 47-year-old female describes progressively loud snoring over the last couple of years.  She used to have poor quality sleep with middle the night awakenings and rumination, insomnia.  However treatment for her anxiety with therapy and medication have improved this.  She is not currently taking sleep aids.  She is reporting a lot of daytime fatigue.  She gets has a hard time getting out of bed in the morning due to the alarm.  She needs to hit snooze a lot.  Without the alarm she can sleep another hour and a half or so.  She typically goes to bed around 11 PM. Alarm goes off around 715.      She does get nudged at night due to the snoring.  She has felt herself snorting on occasion.  She thinks this happens when she is on her back.  Otherwise, she denies waking up with sore throat, dry mouth or headaches.  She typically gets up 0-1 time to go to the bathroom.  She does sleep in all positions.      No night sweats or hot flashes.  No  bruxism of jaw clenching. No history of nightmares, sleepwalking, sleep talking or dream enactment behavior.  She does tend to remember her dreams a lot.  In general feels her sleep is not restful.    During the day she drinks 1 cup of strong coffee in the morning.  She is not routinely taking naps although she recently did take some naps after experiencing COVID.  She has gained weight in the last few years possibly related to some medication that was initiated for anxiety.  She also has a strong family history of sleep apnea in both her parents.      [place] Sleepiness Scale   Sitting and reading: Slight chance of dozing   Watching TV: Slight chance of dozing   Sitting, inactive in a public place (e.g. a theatre or a meeting): Would never doze   As a passenger in a car for an hour without a break: Slight chance of dozing   Lying down to rest in the afternoon when circumstances permit: Moderate chance of dozing   Sitting and talking to someone: Would never doze   Sitting quietly after a lunch without alcohol: Would never doze   In a car, while stopped for a few minutes in traffic: Would never doze   Total score - [place]: 5   (Less than 10 normal)    Insomnia Severity Scale  DEMARCO Total Score: 6  Total score categories:  0-7 = No clinically significant insomnia   8-14 = Subthreshold insomnia   15-21 = Clinical insomnia (moderate severity)  22-28 = Clinical insomnia (severe)    STOP-BANG  Loud Snore   1  Excessively Tired/Sleepy   1  Observed apnea   0  Hypertension   0  BMI> 35 kg/m2   0  Age >50   0  Neck >16 in/40cm   0  Male Gender   0  Total =   2  (0-2 low, 3-4 intermediate, 5-8 high risk of DAVE)      Past Medical History:   Diagnosis Date     ASCUS of cervix with negative high risk HPV 5/25/2021 2001-2007 NIL annual paps 2010, 2012 NIL paps 2/11/14 NIL pap, neg HPV 5/25/21 ASCUS, neg HPV. Plan: cotest in 3 years     Hypothyroidism      Septate Uterus        Allergies   Allergen Reactions     Shellfish  Allergy Anaphylaxis     Peanuts [Nuts] Difficulty breathing     Phenylephrine      Cardiac arrhythmia       Current Outpatient Medications   Medication     escitalopram (LEXAPRO) 10 MG tablet     levothyroxine (SYNTHROID/LEVOTHROID) 125 MCG tablet     ZYRTEC 10 MG PO TABS     valACYclovir (VALTREX) 1000 mg tablet     No current facility-administered medications for this visit.       Social History     Socioeconomic History     Marital status:      Spouse name: Not on file     Number of children: 2     Years of education: Not on file     Highest education level: Not on file   Occupational History     Occupation: Homemaker   Tobacco Use     Smoking status: Never Smoker     Smokeless tobacco: Never Used   Substance and Sexual Activity     Alcohol use: No     Alcohol/week: 0.0 standard drinks     Drug use: No     Sexual activity: Yes     Partners: Male     Birth control/protection: Male Surgical     Comment: vasectomy   Other Topics Concern     Parent/sibling w/ CABG, MI or angioplasty before 65F 55M? No   Social History Narrative    Caffeine intake/servings daily - 0    Calcium intake/servings daily - 3-4    Exercise 3 times weekly - describe walking,biking    Sunscreen used - Yes    Seatbelts used - Yes    Guns stored in the home - No    Self Breast Exam - Yes    Pap test up to date -  Yes    Eye exam up to date -  Not Applicable    Dental exam up to date -  Yes    DEXA scan up to date -  Not Applicable    Flex Sig/Colonoscopy up to date -  Not Applicable    Mammography up to date -  Not Applicable    Immunizations reviewed and up to date - needs TDAP    Abuse: Current or Past (Physical, Sexual or Emotional) - No    Do you feel safe in your environment - Yes    Do you cope well with stress - Yes    Do you suffer from insomnia - Yes    Last updated by: FRANNY MALIK  2/8/2012                                     Social Determinants of Health     Financial Resource Strain: Not on file   Food Insecurity: Not  "on file   Transportation Needs: Not on file   Physical Activity: Not on file   Stress: Not on file   Social Connections: Not on file   Intimate Partner Violence: Not on file   Housing Stability: Not on file       Family History   Problem Relation Age of Onset     Thyroid Disease Mother      Osteoporosis Mother      Depression Father      C.A.D. Father         MI     Lipids Father      Substance Abuse Father      Hyperlipidemia Father      Thyroid Disease Sister      No Known Problems Brother      Thyroid Disease Brother      Osteoporosis Maternal Grandmother      Thyroid Disease Maternal Grandmother      Thyroid Disease Maternal Grandfather      No Known Problems Paternal Grandmother      No Known Problems Paternal Grandfather      No Known Problems Daughter      No Known Problems Daughter      Depression Brother      Substance Abuse Brother      No Known Problems Other      Thyroid Disease Maternal Aunt            EXAM:  Ht 1.6 m (5' 3\")   Wt 83.9 kg (185 lb)   BMI 32.77 kg/m    GENERAL: Alert and no distress  EYES: Eyes grossly normal to inspection.  No discharge or erythema, or obvious scleral/conjunctival abnormalities.  RESP: No audible wheeze, cough, or visible cyanosis.  No visible retractions or increased work of breathing.    SKIN: Visible skin clear. No significant rash, abnormal pigmentation or lesions.  NEURO: Cranial nerves grossly intact.  Mentation and speech appropriate for age.  PSYCH: Mentation appears normal, affect normal, judgement and insight intact, normal speech and appearance well-groomed.       TSH   Date Value Ref Range Status   03/18/2022 0.58 0.40 - 4.00 mU/L Final   03/31/2021 0.55 0.40 - 4.00 mU/L Final       ASSESSMENT:  47-year-old female with history of generalized anxiety disorder, hypothyroidism, significant weight gain, improved insomnia but now with daytime fatigue and loud snoring.  She is at low to intermediate risk for obstructive sleep apnea.  Given her comorbidities and " symptoms identifying and treating obstructive sleep apnea is medically indicated.  Differential diagnosis for fatigue could include medication effect.    PLAN:  Recommended home sleep apnea testing.  We reviewed the pathophysiology of obstructive sleep apnea, testing options and treatment options including CPAP, oral appliances, positional therapy and weight loss.  Patient is agreeable to proceeding.  In the meantime consider using a light blocking mask during sleep as the bedroom is not very dark.  Continue efforts at weight management and other wellness measures.  She is agreeable with this plan.      47 minutes spent on the date of the encounter doing chart review, history and exam, documentation and further activities per the note    Gracia Hawkins M.D.  Pulmonary/Critical Care/Sleep Medicine    [facility]   Floor 1, [address]   [address] [place] [address]. [place] [address]   Appointments: [phone]    The above note was dictated using voice recognition software and may include typographical errors. Please contact the author for any clarifications.                Again, thank you for allowing me to participate in the care of your patient.        Sincerely,        Gracia Hawkins MD

## 2022-05-25 NOTE — PATIENT INSTRUCTIONS
"      MY TREATMENT INFORMATION FOR SLEEP APNEA-  Deysi Douglas    DOCTOR : Gracia Hawkins MD    Am I having a sleep study at a sleep center?  --->Due to normal delays, you will be contacted within 2-4 weeks to schedule    Am I having a home sleep study?  --->Watch the video for the device you are using:    -/drop off device-   https://www.Lamsa.com/watch?v=yGGFBdELGhk        Frequently asked questions:  1. What is Obstructive Sleep Apnea (DAVE)? DAVE is the most common type of sleep apnea. Apnea means, \"without breath.\"  Apnea is most often caused by narrowing or collapse of the upper airway as muscles relax during sleep.   Almost everyone has occasional apneas. Most people with sleep apnea have had brief interruptions at night frequently for many years.  The severity of sleep apnea is related to how frequent and severe the events are.   2. What are the consequences of DAVE? Symptoms include: feeling sleepy during the day, snoring loudly, gasping or stopping of breathing, trouble sleeping, and occasionally morning headaches or heartburn at night.  Sleepiness can be serious and even increase the risk of falling asleep while driving. Other health consequences may include development of high blood pressure and other cardiovascular disease in persons who are susceptible. Untreated DAVE  can contribute to heart disease, stroke and diabetes.   3. What are the treatment options? In most situations, sleep apnea is a lifelong disease that must be managed with daily therapy. Medications are not effective for sleep apnea and surgery is generally not considered until other therapies have been tried. Your treatment is your choice . Continuous Positive Airway (CPAP) works right away and is the therapy that is effective in nearly everyone. An oral device to hold your jaw forward is usually the next most reliable option. Other options include postioning devices (to keep you off your back), weight loss, and surgery " including a tongue pacing device. There is more detail about some of these options below.  4. Are my sleep studies covered by insurance? Although we will request verification of coverage, we advise you also check in advance of the study to ensure there is coverage.    Important tips for those choosing CPAP and similar devices   Know your equipment:  CPAP is continuous positive airway pressure that prevents obstructive sleep apnea by keeping the throat from collapsing while you are sleeping. In most cases, the device is  smart  and can slowly self-adjusts if your throat collapses and keeps a record every day of how well you are treated-this information is available to you and your care team.  BPAP is bilevel positive airway pressure that keeps your throat open and also assists each breath with a pressure boost to maintain adequate breathing.  Special kinds of BPAP are used in patients who have inadequate breathing from lung or heart disease. In most cases, the device is  smart  and can slowly self-adjusts to assist breathing. Like CPAP, the device keeps a record of how well you are treated.  Your mask is your connection to the device. You get to choose what feels most comfortable and the staff will help to make sure if fits. Here: are some examples of the different masks that are available:       Key points to remember on your journey with sleep apnea:  Sleep study.  PAP devices often need to be adjusted during a sleep study to show that they are effective and adjusted right.  Good tips to remember: Try wearing just the mask during a quiet time during the day so your body adapts to wearing it. A humidifier is recommended for comfort in most cases to prevent drying of your nose and throat. Allergy medication from your provider may help you if you are having nasal congestion.  Getting settled-in. It takes more than one night for most of us to get used to wearing a mask. Try wearing just the mask during a quiet time  during the day so your body adapts to wearing it. A humidifier is recommended for comfort in most cases. Our team will work with you carefully on the first day and will be in contact within 4 days and again at 2 and 4 weeks for advice and remote device adjustments. Your therapy is evaluated by the device each day.   Use it every night. The more you are able to sleep naturally for 7-8 hours, the more likely you will have good sleep and to prevent health risks or symptoms from sleep apnea. Even if you use it 4 hours it helps. Occasionally all of us are unable to use a medical therapy, in sleep apnea, it is not dangerous to miss one night.   Communicate. Call our skilled team on the number provided on the first day if your visit for problems that make it difficult to wear the device. Over 2 out of 3 patients can learn to wear the device long-term with help from our team. Remember to call our team or your sleep providers if you are unable to wear the device as we may have other solutions for those who cannot adapt to mask CPAP therapy. It is recommended that you sleep your sleep provider within the first 3 months and yearly after that if you are not having problems.   Use it for your health. We encourage use of CPAP masks during daytime quiet periods to allow your face and brain to adapt to the sensation of CPAP so that it will be a more natural sensation to awaken to at night or during naps. This can be very useful during the first few weeks or months of adapting to CPAP though it does not help medically to wear CPAP during wakefulness and  should not be used as a strategy just to meet guidelines.  Take care of your equipment. Make sure you clean your mask and tubing using directions every day and that your filter and mask are replaced as recommended or if they are not working.     BESIDES CPAP, WHAT OTHER THERAPIES ARE THERE?    Positioning Device  Positioning devices are generally used when sleep apnea is mild and  only occurs on your back.This example shows a pillow that straps around the waist. It may be appropriate for those whose sleep study shows milder sleep apnea that occurs primarily when lying flat on one's back. Preliminary studies have shown benefit but effectiveness at home may need to be verified by a home sleep test. These devices are generally not covered by medical insurance.  Examples of devices that maintain sleeping on the back to prevent snoring and mild sleep apnea.    Belt type body positioner  http://Solapa4.[employer]/    Electronic reminder  http://nightshifttherapy.com/  http://www.[employer]ion[employer].au/      Oral Appliance  What is oral appliance therapy?  An oral appliance device fits on your teeth at night like a retainer used after having braces. The device is made by a specialized dentist and requires several visits over 1-2 months before a manufactured device is made to fit your teeth and is adjusted to prevent your sleep apnea. Once an oral device is working properly, snoring should be improved. A home sleep test may be recommended at that time if to determine whether the sleep apnea is adequately treated.       Some things to remember:  -Oral devices are often, but not always, covered by your medical insurance. Be sure to check with your insurance provider.   -If you are referred for oral therapy, you will be given a list of specialized dentists to consider or you may choose to visit the Web site of the American Academy of Dental Sleep Medicine  -Oral devices are less likely to work if you have severe sleep apnea or are extremely overweight.     More detailed information  An oral appliance is a small acrylic device that fits over the upper and lower teeth  (similar to a retainer or a mouth guard). This device slightly moves jaw forward, which moves the base of the tongue forward, opens the airway, improves breathing for effective treat snoring and obstructive sleep apnea in perhaps 7 out of 10 people .   The best working devices are custom-made by a dental device  after a mold is made of the teeth 1, 2, 3.  When is an oral appliance indicated?  Oral appliance therapy is recommended as a first-line treatment for patients with primary snoring, mild sleep apnea, and for patients with moderate sleep apnea who prefer appliance therapy to use of CPAP4, 5. Severity of sleep apnea is determined by sleep testing and is based on the number of respiratory events per hour of sleep.   How successful is oral appliance therapy?  The success rate of oral appliance therapy in patients with mild sleep apnea is 75-80% while in patients with moderate sleep apnea it is 50-70%. The chance of success in patients with severe sleep apnea is 40-50%. The research also shows that oral appliances have a beneficial effect on the cardiovascular health of DAVE patients at the same magnitude as CPAP therapy7.  Oral appliances should be a second-line treatment in cases of severe sleep apnea, but if not completely successful then a combination therapy utilizing CPAP plus oral appliance therapy may be effective. Oral appliances tend to be effective in a broad range of patients although studies show that the patients who have the highest success are females, younger patients, those with milder disease, and less severe obesity. 3, 6.   Finding a dentist that practices dental sleep medicine  Specific training is available through the American Academy of Dental Sleep Medicine for dentists interested in working in the field of sleep. To find a dentist who is educated in the field of sleep and the use of oral appliances, near you, visit the Web site of the American Academy of Dental Sleep Medicine.    References  1. Nahed et al. Objectively measured vs self-reported compliance during oral appliance therapy for sleep-disordered breathing. Chest 2013; 144(5): 3196-9110.  2. Michael et al. Objective measurement of compliance during oral  appliance therapy for sleep-disordered breathing. Thorax 2013; 68(1): 91-96.  3. Radha, et al. Mandibular advancement devices in 620 men and women with DAVE and snoring: tolerability and predictors of treatment success. Chest 2004; 125: 2449-2074.  4. Hill et al. Oral appliances for snoring and DAVE: a review. Sleep 2006; 29: 244-262.  5. Russ et al. Oral appliance treatment for DAVE: an update. J Clin Sleep Med 2014; 10(2): 215-227.  6. Mj et al. Predictors of OSAH treatment outcome. J Dent Res 2007; 86: 9401-2895.      Weight Loss:    Weight loss is a long-term strategy that may improve sleep apnea in some patients.    Weight management is a personal decision and the decision should be based on your interest and the potential benefits.  If you are interested in exploring weight loss strategies, the following discussion covers the impact on weight loss on sleep apnea and the approaches that may be successful.    Being overweight does not necessarily mean you will have health consequences.  Those who have BMI over 35 or over 27 with existing medical conditions carries greater risk.   Weight loss decreases severity of sleep apnea in most people with obesity. For those with mild obesity who have developed snoring with weight gain, even 15-30 pound weight loss can improve and occasionally eliminate sleep apnea.  Structured and life-long dietary and health habits are necessary to lose weight and keep healthier weight levels.     Though there may be significant health benefits from weight loss, long-term weight loss is very difficult to achieve- studies show success with dietary management in less than 10% of people. In addition, substantial weight loss may require years of dietary control and may be difficult if patients have severe obesity. In these cases, surgical management may be considered.  Finally, older individuals who have tolerated obesity without health complications may be less likely to  benefit from weight loss strategies.          Surgery:    Surgery for obstructive sleep apnea is considered generally only when other therapies fail to work. Surgery may be discussed with you if you are having a difficult time tolerating CPAP and or when there is an abnormal structure that requires surgical correction.  Nose and throat surgeries often enlarge the airway to prevent collapse.  Most of these surgeries create pain for 1-2 weeks and up to half of the most common surgeries are not effective throughout life.  You should carefully discuss the benefits and drawbacks to surgery with your sleep provider and surgeon to determine if it is the best solution for you.   More information  Surgery for DAVE is directed at areas that are responsible for narrowing or complete obstruction of the airway during sleep.  There are a wide range of procedures available to enlarge and/or stabilize the airway to prevent blockage of breathing in the three major areas where it can occur: the palate, tongue, and nasal regions.  Successful surgical treatment depends on the accurate identification of the factors responsible for obstructive sleep apnea in each person.  A personalized approach is required because there is no single treatment that works well for everyone.  Because of anatomic variation, consultation with an examination by a sleep surgeon is a critical first step in determining what surgical options are best for each patient.  In some cases, examination during sedation may be recommended in order to guide the selection of procedures.  Patients will be counseled about risks and benefits as well as the typical recovery course after surgery. Surgery is typically not a cure for a person s DAVE.  However, surgery will often significantly improve one s DAVE severity (termed  success rate ).  Even in the absence of a cure, surgery will decrease the cardiovascular risk associated with OSA7; improve overall quality of life8  (sleepiness, functionality, sleep quality, etc).      Palate Procedures:  Patients with DAVE often have narrowing of their airway in the region of their tonsils and uvula.  The goals of palate procedures are to widen the airway in this region as well as to help the tissues resist collapse.  Modern palate procedure techniques focus on tissue conservation and soft tissue rearrangement, rather than tissue removal.  Often the uvula is preserved in this procedure. Residual sleep apnea is common in patient after pharyngoplasty with an average reduction in sleep apnea events of 33%2.      Tongue Procedures:  ExamWhile patients are awake, the muscles that surround the throat are active and keep this region open for breathing. These muscles relax during sleep, allowing the tongue and other structures to collapse and block breathing.  There are several different tongue procedures available.  Selection of a tongue base procedure depends on characteristics seen on physical exam.  Generally, procedures are aimed at removing bulky tissues in this area or preventing the back of the tongue from falling back during sleep.  Success rates for tongue surgery range from 50-62%3.    Hypoglossal Nerve Stimulation:  Hypoglossal nerve stimulation has recently received approval from the [place] Food and Drug Administration for the treatment of obstructive sleep apnea.  This is based on research showing that the system was safe and effective in treating sleep apnea6.  Results showed that the median AHI score decreased 68%, from 29.3 to 9.0. This therapy uses an implant system that senses breathing patterns and delivers mild stimulation to airway muscles, which keeps the airway open during sleep.  The system consists of three fully implanted components: a small generator (similar in size to a pacemaker), a breathing sensor, and a stimulation lead.  Using a small handheld remote, a patient turns the therapy on before bed and off upon  awakening.    Candidates for this device must be greater than 22 years of age, have moderate to severe DAVE (AHI between 20-65), BMI less than 32, have tried CPAP/oral appliance without success, and have appropriate upper airway anatomy (determined by a sleep endoscopy performed by Dr. Ames).    Hypoglossal Nerve Stimulation Pathway:    The sleep surgeon s office will work with the patient through the insurance prior-authorization process (including communications and appeals).    Nasal Procedures:  Nasal obstruction can interfere with nasal breathing during the day and night.  Studies have shown that relief of nasal obstruction can improve the ability of some patients to tolerate positive airway pressure therapy for obstructive sleep apnea1.  Treatment options include medications such as nasal saline, topical corticosteroid and antihistamine sprays, and oral medications such as antihistamines or decongestants. Non-surgical treatments can include external nasal dilators for selected patients. If these are not successful by themselves, surgery can improve the nasal airway either alone or in combination with these other options.      Combination Procedures:  Combination of surgical procedures and other treatments may be recommended, particularly if patients have more than one area of narrowing or persistent positional disease.  The success rate of combination surgery ranges from 66-80%2,3.    References  Remedios PRESCOTT. The Role of the Nose in Snoring and Obstructive Sleep Apnoea: An Update.  Eur Arch Otorhinolaryngol. 2011; 268: 1365-73.   Darcy SM; Kj JA; Steph JR; Pallanch JF; Juan MB; Ron SG; Brandon BLACKBURN. Surgical modifications of the upper airway for obstructive sleep apnea in adults: a systematic review and meta-analysis. SLEEP 2010;33(10):7755-6328. Luca SAINI. Hypopharyngeal surgery in obstructive sleep apnea: an evidence-based medicine review.  Arch Otolaryngol Head Neck Surg. 2006  Feb;132(2):206-13.  Luis E YH1, Ancelmo Y, Jonah AB. The efficacy of anatomically based multilevel surgery for obstructive sleep apnea. Otolaryngol Head Neck Surg. 2003 Oct;129(4):327-35.  Luca SAINI, Goldberg A. Hypopharyngeal Surgery in Obstructive Sleep Apnea: An Evidence-Based Medicine Review. Arch Otolaryngol Head Neck Surg. 2006 Feb;132(2):206-13.  Agnieszka BRADLEY et al. Upper-Airway Stimulation for Obstructive Sleep Apnea.  N Engl J Med. 2014 Jan 9;370(2):139-49.  Pejoão Y et al. Increased Incidence of Cardiovascular Disease in Middle-aged Men with Obstructive Sleep Apnea. Am J Respir Crit Care Med; 2002 166: 159-165  Robertoscott FRIAS et al. Studying Life Effects and Effectiveness of Palatopharyngoplasty (SLEEP) study: Subjective Outcomes of Isolated Uvulopalatopharyngoplasty. Otolaryngol Head Neck Surg. 2011; 144: 623-631.        WHAT IF I ONLY HAVE SNORING?    Mandibular advancement devices, lateral sleep positioning, long-term weight loss and treatment of nasal allergies have been shown to improve snoring.  Exercising tongue muscles with a game (https://www.ncbi.nlm.nih.gov/pubmed/83228796) or stimulating the tongue during the day with a device (https://doi.org/10.3390/jvm94428962) have improved snoring in some individuals.    Remember to Drive Safe... Drive Alive     Sleep health profoundly affects your health, mood, and your safety.  Thirty three percent of the population (one in three of us) is not getting enough sleep and many have a sleep disorder. Not getting enough sleep or having an untreated / undertreated sleep condition may make us sleepy without even knowing it. In fact, our driving could be dramatically impaired due to our sleep health. As your provider, here are some things I would like you to know about driving:     Here are some warning signs for impairment and dangerous drowsy driving:              -Having been awake more than 16 hours               -Looking tired               -Eyelid drooping               -Head nodding (it could be too late at this point)              -Driving for more than 30 minutes     Some things you could do to make the driving safer if you are experiencing some drowsiness:              -Stop driving and rest              -Call for transportation              -Make sure your sleep disorder is adequately treated     Some things that have been shown NOT to work when experiencing drowsiness while driving:              -Turning on the radio              -Opening windows              -Eating any  distracting  /  entertaining  foods (e.g., sunflower seeds, candy, or any other)              -Talking on the phone      Your decision may not only impact your life, but also the life of others. Please, remember to drive safe for yourself and all of us.

## 2022-07-26 DIAGNOSIS — E06.3 HYPOTHYROIDISM DUE TO HASHIMOTO'S THYROIDITIS: ICD-10-CM

## 2022-07-26 NOTE — PROGRESS NOTES
Quick Note:    Good news, Deysi!   Your x-ray and lab results are normal. If you have increasing pain or issues let me know - but otherwise I would assume that it's a ganglion cyst or overuse heberden's node, none of which are worrisome or need to be treated unless very bothersome.  Let me know if you have any questions.  Payton Horn MD    ______   Stable

## 2022-07-27 ENCOUNTER — MYC MEDICAL ADVICE (OUTPATIENT)
Dept: FAMILY MEDICINE | Facility: CLINIC | Age: 47
End: 2022-07-27

## 2022-07-27 DIAGNOSIS — E06.3 HYPOTHYROIDISM DUE TO HASHIMOTO'S THYROIDITIS: ICD-10-CM

## 2022-07-28 RX ORDER — LEVOTHYROXINE SODIUM 125 UG/1
TABLET ORAL
Qty: 90 TABLET | Refills: 1 | Status: SHIPPED | OUTPATIENT
Start: 2022-07-28 | End: 2022-07-28

## 2022-07-28 RX ORDER — LEVOTHYROXINE SODIUM 125 UG/1
TABLET ORAL
Qty: 90 TABLET | Refills: 1 | Status: SHIPPED | OUTPATIENT
Start: 2022-07-28 | End: 2024-01-11

## 2022-07-28 NOTE — TELEPHONE ENCOUNTER
"Requested Prescriptions   Pending Prescriptions Disp Refills     levothyroxine (SYNTHROID/LEVOTHROID) 125 MCG tablet 90 tablet 1       Thyroid Protocol Passed - 7/28/2022  7:54 AM        Passed - Patient is 12 years or older        Passed - Recent (12 mo) or future (30 days) visit within the authorizing provider's specialty     Patient has had an office visit with the authorizing provider or a provider within the authorizing providers department within the previous 12 mos or has a future within next 30 days. See \"Patient Info\" tab in inbasket, or \"Choose Columns\" in Meds & Orders section of the refill encounter.              Passed - Medication is active on med list        Passed - Normal TSH on file in past 12 months     Recent Labs   Lab Test 03/18/22  0741   TSH 0.58              Passed - No active pregnancy on record     If patient is pregnant or has had a positive pregnancy test, please check TSH.          Passed - No positive pregnancy test in past 12 months     If patient is pregnant or has had a positive pregnancy test, please check TSH.             ASSESSMENT:    "

## 2022-08-06 ENCOUNTER — HEALTH MAINTENANCE LETTER (OUTPATIENT)
Age: 47
End: 2022-08-06

## 2022-10-22 ENCOUNTER — HEALTH MAINTENANCE LETTER (OUTPATIENT)
Age: 47
End: 2022-10-22

## 2022-10-28 ENCOUNTER — HOSPITAL ENCOUNTER (OUTPATIENT)
Dept: MAMMOGRAPHY | Facility: CLINIC | Age: 47
Discharge: HOME | End: 2022-10-28
Attending: FAMILY MEDICINE | Admitting: FAMILY MEDICINE
Payer: COMMERCIAL

## 2022-10-28 DIAGNOSIS — Z12.31 VISIT FOR SCREENING MAMMOGRAM: ICD-10-CM

## 2022-10-28 PROCEDURE — 77067 SCR MAMMO BI INCL CAD: CPT

## 2022-10-31 ENCOUNTER — OFFICE VISIT (OUTPATIENT)
Dept: SLEEP MEDICINE | Facility: CLINIC | Age: 47
End: 2022-10-31
Attending: INTERNAL MEDICINE
Payer: COMMERCIAL

## 2022-10-31 DIAGNOSIS — R53.83 OTHER FATIGUE: ICD-10-CM

## 2022-10-31 DIAGNOSIS — R06.83 SNORING: ICD-10-CM

## 2022-10-31 DIAGNOSIS — E66.09 CLASS 1 OBESITY DUE TO EXCESS CALORIES WITHOUT SERIOUS COMORBIDITY WITH BODY MASS INDEX (BMI) OF 32.0 TO 32.9 IN ADULT: ICD-10-CM

## 2022-10-31 DIAGNOSIS — F41.1 GAD (GENERALIZED ANXIETY DISORDER): ICD-10-CM

## 2022-10-31 DIAGNOSIS — E66.811 CLASS 1 OBESITY DUE TO EXCESS CALORIES WITHOUT SERIOUS COMORBIDITY WITH BODY MASS INDEX (BMI) OF 32.0 TO 32.9 IN ADULT: ICD-10-CM

## 2022-10-31 PROCEDURE — G0399 HOME SLEEP TEST/TYPE 3 PORTA: HCPCS | Performed by: INTERNAL MEDICINE

## 2022-10-31 NOTE — PROGRESS NOTES
Pt is completing a home sleep test. Pt was instructed on how to put on the Noxturnal T3 device and associated equipment before going to bed and given the opportunity to practice putting it on before leaving the sleep center. Pt was reminded to bring the home sleep test kit back to the center tomorrow, at agreed upon time for download and reporting.   Deysi Flores CMA, HST Specialist  [place] [facility]

## 2022-11-01 ENCOUNTER — DOCUMENTATION ONLY (OUTPATIENT)
Dept: SLEEP MEDICINE | Facility: CLINIC | Age: 47
End: 2022-11-01
Payer: COMMERCIAL

## 2022-11-01 NOTE — PROGRESS NOTES
This HSAT was performed using a Noxturnal T3 device which recorded snore, sound, movement activity, body position, nasal pressure, oronasal thermal airflow, pulse, oximetry and both chest and abdominal respiratory effort. HSAT data was restricted to the time patient states they were in bed.     HSAT was scored using 1B 4% hypopnea rule.     HST AHI (Non-PAT): 5.4  Snoring was reported as mild and intermittent.  Time with SpO2 below 89% was 6.1 minutes.   Overall signal quality was good.     Pt will follow up with sleep provider to determine appropriate therapy.

## 2022-11-03 NOTE — PROCEDURES
"HOME SLEEP STUDY INTERPRETATION        Patient: Deysi Douglas  MRN: [id number]  YOB: 1975  Study Date: 10/31/2022  PCP/Referring Provider: Payton Horn MD  Ordering Provider: Gracia Hawkins MD         Indications for Home Study: Deysi Douglas is a 47 year old female with a history of hypothyroidism, generalized anxiety disorder who presents with symptoms suggestive of obstructive sleep apnea.    Estimated body mass index is 32.77 kg/m  as calculated from the following:    Height as of 5/25/22: 1.6 m (5' 3\").    Weight as of 5/25/22: 83.9 kg (185 lb).  Total score - [place]: 5 (5/25/2022  7:43 AM)  STOP-BANG Total Score: 1 (5/25/2022  7:43 AM)        Data: A full night home sleep study was performed recording the standard physiologic parameters including body position, movement, sound, nasal pressure, thermal oral airflow, chest and abdominal movements with respiratory inductance plethysmography, and oxygen saturation by pulse oximetry. Pulse rate was estimated by oximetry recording. This study was considered adequate based on > 4 hours of quality oximetry and respiratory recording. As specified by the AASM Manual for the Scoring of Sleep and Associated events, version 2.3, Rule VIII.D 1B, 4% oxygen desaturation scoring for hypopneas is used as a standard of care on all home sleep apnea testing.        Analysis Time:  513 minutes        Respiration:   Sleep Associated Hypoxemia: sustained hypoxemia was present. Baseline oxygen saturation was 97%.  Time with saturation less than or equal to 88% was 6.1 minutes. The lowest oxygen saturation was 78%.   Snoring: Snoring was present 1% time with average dB 67   Respiratory events: The home study revealed a presence of 41 obstructive apneas and 1 mixed and central apneas. There were 4 hypopneas resulting in a combined apnea/hypopnea index [AHI] of 5.4 events per hour.  AHI was 8.7 per hour supine, NA per hour prone, 0 per hour on left side, " and 0.8 per hour on right side.   Pattern: Excluding events noted above, respiratory rate and pattern was Normal.      Position: Percent of time spent: supine - 61%, prone - 0%, on left - 25%, on right - 15%.      Heart Rate: By pulse oximetry normal rate was noted.       Assessment:     Mild supine predominant, obstructive sleep apnea with apnea/hypopnea index [AHI] of 5.4 events per hour.    Sleep associated hypoxemia was present-pattern suggests REM related.    Recommendations:    Consider auto-CPAP at 5-15 cmH2O, oral appliance therapy or positional therapy.    Suggest optimizing sleep hygiene and avoiding sleep deprivation.    Weight management.        Diagnosis Code(s): Obstructive Sleep Apnea G47.33, Hypoxemia G47.36, Snoring R06.83    Gracia Hawkins MD, November 3, 2022   Diplomate, American Board of Internal Medicine, Sleep Medicine

## 2022-11-06 NOTE — PROGRESS NOTES
HST POST-STUDY QUESTIONNAIRE    1. What time did you go to bed?  22:45  2. How long do you think it took to fall asleep?  10 minuntes  3. What time did you wake up to start the day?  06:35  4. Did you get up during the night at all?  No  5. If you woke up, do you remember approximately what time(s)? I don't remember   6. Did you have any difficulty with the equipment?  No  7. Did you us any type of treatment with this study?  None  8. Was the head of the bed elevated? No  9. Did you sleep in a recliner?  No  10. Did you stop using CPAP at least 3 days before this test? N/A  11. Any other information you'd like us to know?

## 2022-12-06 ENCOUNTER — VIRTUAL VISIT (OUTPATIENT)
Dept: SLEEP MEDICINE | Facility: CLINIC | Age: 47
End: 2022-12-06
Payer: COMMERCIAL

## 2022-12-06 VITALS — WEIGHT: 185 LBS | BODY MASS INDEX: 32.78 KG/M2 | HEIGHT: 63 IN

## 2022-12-06 DIAGNOSIS — G47.33 OBSTRUCTIVE SLEEP APNEA: Primary | ICD-10-CM

## 2022-12-06 DIAGNOSIS — F41.1 GAD (GENERALIZED ANXIETY DISORDER): ICD-10-CM

## 2022-12-06 PROCEDURE — 99214 OFFICE O/P EST MOD 30 MIN: CPT | Mod: 95 | Performed by: INTERNAL MEDICINE

## 2022-12-06 ASSESSMENT — SLEEP AND FATIGUE QUESTIONNAIRES
HOW LIKELY ARE YOU TO NOD OFF OR FALL ASLEEP WHILE SITTING AND TALKING TO SOMEONE: WOULD NEVER DOZE
HOW LIKELY ARE YOU TO NOD OFF OR FALL ASLEEP WHILE WATCHING TV: WOULD NEVER DOZE
HOW LIKELY ARE YOU TO NOD OFF OR FALL ASLEEP WHEN YOU ARE A PASSENGER IN A CAR FOR AN HOUR WITHOUT A BREAK: SLIGHT CHANCE OF DOZING
HOW LIKELY ARE YOU TO NOD OFF OR FALL ASLEEP IN A CAR, WHILE STOPPED FOR A FEW MINUTES IN TRAFFIC: WOULD NEVER DOZE
HOW LIKELY ARE YOU TO NOD OFF OR FALL ASLEEP WHILE SITTING AND READING: SLIGHT CHANCE OF DOZING
HOW LIKELY ARE YOU TO NOD OFF OR FALL ASLEEP WHILE SITTING INACTIVE IN A PUBLIC PLACE: SLIGHT CHANCE OF DOZING
HOW LIKELY ARE YOU TO NOD OFF OR FALL ASLEEP WHILE LYING DOWN TO REST IN THE AFTERNOON WHEN CIRCUMSTANCES PERMIT: HIGH CHANCE OF DOZING
HOW LIKELY ARE YOU TO NOD OFF OR FALL ASLEEP WHILE SITTING QUIETLY AFTER LUNCH WITHOUT ALCOHOL: WOULD NEVER DOZE

## 2022-12-06 ASSESSMENT — PAIN SCALES - GENERAL: PAINLEVEL: NO PAIN (0)

## 2022-12-06 NOTE — PATIENT INSTRUCTIONS
Equipment Instructions    We will process your PAP order and send it to a Durable Medical Equipment (DME) provider.    The medical equipment company should call you within 7 days.  If you have not heard from the company, please contact them to see if they received your order and are planning to call you.    Please call us at [phone] if you are unable to contact the medical equipment company or if they do not have the order.    If you are starting a new PAP machine, please call us after you use it the first night to let us know how it went. This call also helps us know that you received your equipment and that everything is ready. Please use our central phone number [phone]    Contact information for [employer] company:    [facility] Tel: [phone]

## 2022-12-06 NOTE — PROGRESS NOTES
Deysi is a 47 year old who is being evaluated via a billable video visit.      How would you like to obtain your AVS? MyChart  If the video visit is dropped, the invitation should be resent by: Send to e-mail at: [email or web address]  Will anyone else be joining your video visit? Arlene Mckenzie    Video-Visit Details    Video Start Time: 12:56 PM    Type of service:  Video Visit    Video End Time:1:12 PM    Originating Location (pt. Location): Home    Distant Location (provider location):  Off-site    Platform used for Video Visit: Folloyu     Additional 15 minutes on the date of service was spent performing the following:    -Preparing to see the patient (eg, review of tests)   -Obtaining and/or reviewing separately obtained history   -Ordering medications, tests, or procedures   -Documenting clinical information in the electronic or other health record     Thank you for the opportunity to participate in the care of Deysi Douglas.     She is a 47 year old  female patient who comes to the sleep medicine clinic for review of sleep study results. The study was completed on 10/31/2022  which showed that the patient had mild obstructive sleep apnea with an apnea hypopnea index of 5.4 events per hour with the lowest O2 Sat of 78%.    Assessment and Plan:  In summary Deysi Douglas is a 47 year old year old female here for review of sleep study results.  1. Obstructive Sleep Apnea  We had an extensive conversation to review the results of her sleep study and to  her on the importance of treating sleep apnea. We discussed the options of treatment with oral appliance versus CPAP. Patient decided to proceed with CPAP. She will start using the device as soon as she receives it with the intention to use if for the entire night. We discussed some tips to increase PAP tolerance as well as the normal curve of adaptation. CPAP is going to provide improved respiratory function during the night but it can cause  some sleep disruption that tends to improve with continuous usage. She should return to the clinic in 7 weeks to review compliance and efficacy monitoring. Since the patient does not have any preference, we will use [employer] as the durable medical equipment company.     Lab reviewed: Discussed with patient.    DEMARCO:  DEMARCO Total Score: 9  Total score - [place]: 6 (12/6/2022 12:36 PM)    Patient Active Problem List   Diagnosis     Septate Uterus     CARDIOVASCULAR SCREENING; LDL GOAL LESS THAN 160     Mild major depression (H)     MARCELO (generalized anxiety disorder)     Hypothyroidism due to Hashimoto's thyroiditis     Binge-eating disorder, moderate     Alcohol use disorder, mild, in sustained remission     ASCUS of cervix with negative high risk HPV     Class 1 obesity due to excess calories without serious comorbidity with body mass index (BMI) of 32.0 to 32.9 in adult       Current Outpatient Medications   Medication Sig Dispense Refill     escitalopram (LEXAPRO) 10 MG tablet Take 10 mg by mouth daily       levothyroxine (SYNTHROID/LEVOTHROID) 125 MCG tablet TAKE 1 TABLET(125 MCG) BY MOUTH DAILY 90 tablet 1     ZYRTEC 10 MG PO TABS 1 TABLET DAILY       valACYclovir (VALTREX) 1000 mg tablet Take 2 tablets (2,000 mg) by mouth 2 times daily (Patient not taking: No sig reported) 4 tablet 11       Allergies   Allergen Reactions     Shellfish Allergy Anaphylaxis     Peanuts [Nuts] Difficulty breathing     Phenylephrine      Cardiac arrhythmia       Physical Exam:  GEN: NAD  Psych: normal mood, normal affect     Labs/Studies:  - We reviewed the results of the overnight study as described on the HPI.     No results found for: PH, PHARTERIAL, PO2, YN2CNEWAREU, SAT, PCO2, HCO3, BASEEXCESS, DANILO, BEB  Lab Results   Component Value Date    TSH 0.58 03/18/2022    TSH 0.55 03/31/2021     Lab Results   Component Value Date    GLC 96 03/18/2022    GLC 78 10/19/2020     Lab Results   Component Value Date    HGB 12.8  03/18/2022    HGB 13.8 12/02/2016     Lab Results   Component Value Date    BUN 14 03/18/2022    BUN 12 10/19/2020    CR 0.82 03/18/2022    CR 0.82 10/19/2020     Lab Results   Component Value Date    AST 12 03/18/2022    AST 16 10/19/2020    ALT 27 03/18/2022    ALT 26 10/19/2020    ALKPHOS 61 03/18/2022    ALKPHOS 59 10/19/2020    BILITOTAL 0.4 03/18/2022    BILITOTAL 0.3 10/19/2020     Lab Results   Component Value Date    UAMP  05/09/2017     Negative   Cutoff for a negative amphetamine is 500 ng/mL or less.      UBARB  05/09/2017     Negative   Cutoff for a negative barbiturate is 200 ng/mL or less.      BENZODIAZEUR  05/09/2017     Positive   Cutoff for a positive benzodiazepine is greater than 200 ng/mL. This is an   unconfirmed screening result to be used for medical purposes only.      UCANN  05/09/2017     Negative   Cutoff for a negative cannabinoid is 50 ng/mL or less.      UCOC  05/09/2017     Negative   Cutoff for a negative cocaine is 300 ng/mL or less.      OPIT  05/09/2017     Negative   Cutoff for a negative opiate is 300 ng/mL or less.         Recent Labs   Lab Test 03/18/22  0741 10/19/20  1221    138   POTASSIUM 4.0 4.1   CHLORIDE 109 107   CO2 27 26   ANIONGAP 4 5   GLC 96 78   BUN 14 12   CR 0.82 0.82   GERARDO 8.3* 8.5       Ferritin   Date Value Ref Range Status   12/02/2016 17 12 - 150 ng/mL Final         Patient verbalized understanding of these issues, agrees with the plan and all questions were answered today. Patient was given an opportuntity to voice any other symptoms or concerns not listed above. Patient did not have any other symptoms or concerns.      Matti Rodriguez DO  Board Certified in Internal Medicine and Sleep Medicine      (Note created with Dragon voice recognition and unintended spelling errors and word substitutions may occur)

## 2022-12-07 NOTE — NURSING NOTE
DME orders have been automatically faxed to [facility].  Glenda Enriquez, CMA

## 2022-12-19 ENCOUNTER — TELEPHONE (OUTPATIENT)
Dept: SLEEP MEDICINE | Facility: CLINIC | Age: 47
End: 2022-12-19

## 2022-12-19 NOTE — TELEPHONE ENCOUNTER
Left message for patient to call [facility] s main line at [phone] to schedule CPAP setup appointment.

## 2022-12-22 NOTE — TELEPHONE ENCOUNTER
Left second message for patient to call [facility] s main line at [phone] to schedule CPAP setup appointment.

## 2023-06-01 ENCOUNTER — HEALTH MAINTENANCE LETTER (OUTPATIENT)
Age: 48
End: 2023-06-01

## 2023-09-28 ENCOUNTER — PATIENT OUTREACH (OUTPATIENT)
Dept: CARE COORDINATION | Facility: CLINIC | Age: 48
End: 2023-09-28
Payer: COMMERCIAL

## 2023-10-23 ENCOUNTER — DOCUMENTATION ONLY (OUTPATIENT)
Dept: SLEEP MEDICINE | Facility: CLINIC | Age: 48
End: 2023-10-23
Payer: COMMERCIAL

## 2023-10-23 DIAGNOSIS — G47.33 OSA (OBSTRUCTIVE SLEEP APNEA): Primary | ICD-10-CM

## 2023-10-23 DIAGNOSIS — F41.1 GENERALIZED ANXIETY DISORDER: ICD-10-CM

## 2023-10-23 NOTE — PROGRESS NOTES
Past Medical History:   Diagnosis Date    Cataract     Chronic diarrhea 11/26/2018    Depression     GERD (gastroesophageal reflux disease)     History of colonic polyps 11/26/2018    Hyperlipidemia     Hypertension     Menopausal syndrome     PAD (peripheral artery disease)     s/p stent    Stroke      Past Surgical History:   Procedure Laterality Date    APPENDECTOMY      COLONOSCOPY N/A 5/20/2019    Procedure: COLONOSCOPY;  Surgeon: Kenia Arias MD;  Location: [facility];  Service: Endoscopy;  Laterality: N/A;  RX PLETAL ok to hold    ESOPHAGOGASTRODUODENOSCOPY N/A 5/26/2023    Procedure: EGD (ESOPHAGOGASTRODUODENOSCOPY);  Surgeon: Debbie Cooper MD;  Location: [facility];  Service: Endoscopy;  Laterality: N/A;    HYSTERECTOMY      MONTY with BSO    ILIAC ARTERY STENT Bilateral      Review of patient's allergies indicates:   Allergen Reactions    Antihistamines - alkylamine Nausea Only    Ciprofloxacin Nausea Only    Penicillin     Penicillins Hives     Medications:  Continuous Infusions:   dextrose 5 % and 0.9 % NaCl 75 mL/hr at 07/17/23 0043    sodium chloride 0.9%       Scheduled Meds:   aspirin  81 mg Oral Daily    atorvastatin  40 mg Oral Daily    heparin (porcine)  5,000 Units Subcutaneous Q8H    polyethylene glycol  17 g Oral Daily    potassium chloride  10 mEq Intravenous Q1H    sertraline  100 mg Oral Daily     PRN Meds:acetaminophen, acetaminophen, albuterol-ipratropium, aluminum-magnesium hydroxide-simethicone, bisacodyL, dextrose 50%, dextrose 50%, glucagon (human recombinant), glucose, glucose, labetaloL, magnesium oxide, magnesium oxide, melatonin, naloxone, ondansetron, potassium bicarbonate, potassium bicarbonate, potassium bicarbonate, potassium, sodium phosphates, potassium, sodium phosphates, potassium, sodium phosphates, prochlorperazine, simethicone, sodium chloride 0.9%, sodium chloride 0.9%, sodium chloride 0.9%    Family History       Problem Relation (Age of Onset)    Amblyopia Mother     Patient was offered choice of vendor and chose [facility].  Patient Deysi Douglas was set up at [place] on October 23, 2023. Patient received a Resmed Airsense 11 Pressures were set at  5-15 cm H2O.   Patient s ramp is 4 cm H2O for 20 min and FLEX/EPR is 2.  Patient received a [employer] Mask name: Eson 2  Nasal mask size Small, heated tubing and heated humidifier.  Patient has the following compliance requirements: using and visit requirements  Patient has a follow up on 1/26/24 with Dr. Rodriguez.    Jody Patricia    Cancer Father, Brother, Maternal Uncle, Maternal Uncle, Maternal Uncle, Maternal Uncle    Cataracts Mother    Diabetes Maternal Aunt, Maternal Uncle    Hypertension Mother    Strabismus Sister    Thyroid disease Mother          Tobacco Use    Smoking status: Former     Types: Cigarettes     Quit date:      Years since quittin.5    Smokeless tobacco: Never    Tobacco comments:     .  Retired  at [employer].   Substance and Sexual Activity    Alcohol use: No    Drug use: No    Sexual activity: Not Currently     Partners: Male     Comment:      Review of Systems   Unable to perform ROS: Patient nonverbal   Objective:     Vital Signs (Most Recent):  Temp: 97.5 °F (36.4 °C) (23 0753)  Pulse: 63 (23 0753)  Resp: 18 (23)  BP: (!) 158/66 (23)  SpO2: 97 % (23) Vital Signs (24h Range):  Temp:  [97.3 °F (36.3 °C)-98.6 °F (37 °C)] 97.5 °F (36.4 °C)  Pulse:  [63-77] 63  Resp:  [16-18] 18  SpO2:  [94 %-97 %] 97 %  BP: (101-182)/(53-77) 158/66     Weight: 48.2 kg (106 lb 4.2 oz)  Body mass index is 21.46 kg/m².     Physical Exam  Vitals and nursing note reviewed.   Constitutional:       Appearance: She is ill-appearing.   HENT:      Head: Normocephalic and atraumatic.      Nose: Nose normal.   Pulmonary:      Effort: Pulmonary effort is normal. No respiratory distress.   Neurological:      Mental Status: She is alert.      Comments: Pt non-verbal; tracks, will make eye contact with  but not provider; would not follow commands; eyes closed/sleeping most of visit       Advance Care Planning   Advance Directives:   Living Will: No    LaPOST: No    Do Not Resuscitate Status: No ('s GOC for time limited trial of life support if needed in future; if pt were to decline would be open to further discussion)    Medical Power of : No ( - Clark, legal next of kin)      Decision Making:  Family answered questions  Goals of Care: The  family endorses that what is most important right now is to focus on extending life as long as possible, even it it means sacrificing quality and improvement in condition.     Accordingly, we have decided that the best plan to meet the patient's goals includes continuing with treatment and continued assessment of pt's ability to tolerate PO to determine next steps/further GOC.      Significant Labs: All pertinent labs within the past 24 hours have been reviewed.  CBC:   Recent Labs   Lab 07/17/23  0602   WBC 4.52   HGB 11.3*   HCT 35.5*   MCV 84        BMP:  Recent Labs   Lab 07/17/23  0602   *      K 2.9*   *   CO2 21*   BUN 14   CREATININE 0.8   CALCIUM 8.9     LFT:  Lab Results   Component Value Date    AST 19 07/17/2023    ALKPHOS 62 07/17/2023    BILITOT 0.4 07/17/2023     Albumin:   Albumin   Date Value Ref Range Status   07/17/2023 3.2 (L) 3.5 - 5.2 g/dL Final     Protein:   Total Protein   Date Value Ref Range Status   07/17/2023 6.0 6.0 - 8.4 g/dL Final     Lactic acid:   No results found for: LACTATE    Significant Imaging: I have reviewed all pertinent imaging results/findings within the past 24 hours.

## 2023-10-26 ENCOUNTER — PATIENT OUTREACH (OUTPATIENT)
Dept: CARE COORDINATION | Facility: CLINIC | Age: 48
End: 2023-10-26

## 2023-10-26 ENCOUNTER — DOCUMENTATION ONLY (OUTPATIENT)
Dept: SLEEP MEDICINE | Facility: CLINIC | Age: 48
End: 2023-10-26
Payer: COMMERCIAL

## 2023-10-26 NOTE — PROGRESS NOTES
3 day Sleep therapy management telephone visit    Diagnostic AHI:  5.4 HST     Confirmed with patient at time of call- N/A Patient is still interested in STM service       Message left for patient to return call.        Objective data     Order Settings for PAP  CPAP min     CPAP max              Device settings from machine CPAP min 5.0     CPAP max 15.0           EPR Setting TWO    RESMED soft response  OFF     Assessment: Nightly usage most nights under four hours       Action plan: Patient to have 14 day STM visit. Patient has a follow up visit scheduled:   yes within 31-90 days of set up    Replacement device: No  STM ordered by provider: Yes     Total time spent on accessing and  interpreting remote patient PAP therapy data  10 minutes    Total time spent counseling, coaching  and reviewing PAP therapy data with patient  1 minutes    [id number] no

## 2023-11-01 ENCOUNTER — DOCUMENTATION ONLY (OUTPATIENT)
Dept: SLEEP MEDICINE | Facility: CLINIC | Age: 48
End: 2023-11-01
Payer: COMMERCIAL

## 2023-11-01 DIAGNOSIS — G47.33 OSA (OBSTRUCTIVE SLEEP APNEA): Primary | ICD-10-CM

## 2023-11-01 DIAGNOSIS — F41.1 GENERALIZED ANXIETY DISORDER: ICD-10-CM

## 2023-11-01 NOTE — PROGRESS NOTES
Patient came to virtual set up via telephone visit for mask fitting appointment on November 1, 2023. Patient requested to switch masks because soreness/skin irritation . Discussed the following masks: Airfit P10, P30i, Dreamwear silicone pillows, Nuance Pro. Patient selected a [employer] Mask name: Dreamwear Pillow mask size Standard.

## 2023-11-09 ENCOUNTER — DOCUMENTATION ONLY (OUTPATIENT)
Dept: SLEEP MEDICINE | Facility: CLINIC | Age: 48
End: 2023-11-09
Payer: COMMERCIAL

## 2023-11-09 NOTE — PROGRESS NOTES
14  DAY STM VISIT    Diagnostic AHI: 5.4  HST    Message left for patient to return call     Assessment: Pt not meeting objective benchmarks for compliance      Action plan: waiting for patient to return call.  and pt to have 30 day STM visit.      Device type: Auto-CPAP    PAP settings:  CPAP MIN CPAP MAX 95TH % PRESSURE EPR RESMED SOFT RESPONSE SETTING   5.0 cm  H20 15.0 cm  H20 8.6 cm  H20  TWO OFF     Mask type:  Nasal Pillows    Objective measures: 14 day rolling measures   COMPLIANCE LEAK AHI AVERAGE USE IN MINUTES   57 % 1.68 0.47 290   GOAL >70% GOAL < 24 LPM GOAL <5 GOAL >240      Patient has the following upcoming sleep appts:  Future Sleep Appointments         Provider Department    1/26/2024 8:00 AM Matti Rodriguez DO [facility]            Total time spent on accessing and interpreting remote patient PAP therapy data  10 minutes    Total time spent counseling, coaching  and reviewing PAP therapy data with patient  1 minute    [id number]  [id number]  no (3 day STM)

## 2023-11-14 ENCOUNTER — HOSPITAL ENCOUNTER (OUTPATIENT)
Dept: MAMMOGRAPHY | Facility: CLINIC | Age: 48
Discharge: HOME | End: 2023-11-14
Attending: FAMILY MEDICINE | Admitting: FAMILY MEDICINE
Payer: COMMERCIAL

## 2023-11-14 DIAGNOSIS — Z12.31 VISIT FOR SCREENING MAMMOGRAM: ICD-10-CM

## 2023-11-14 PROCEDURE — 77067 SCR MAMMO BI INCL CAD: CPT

## 2023-11-17 NOTE — PROGRESS NOTES
PT SENT AN EMAIL: I visited my parents and left the machine at their house accidentally. It took me a few days to get it back. I'm back to using the original mask because the pillows mask is likely too big. What is your availability next week? I'd like to stop by and try what you are suggesting.  I REPLIED: Good morning Deysi,  If the top of the frame moves around on your head too much or sags w/ the weight of the tubing it is likely too big. Or if it doesn t sit in front of the crown of your head then it is too big. It should be about in the middle of the top of your head. Clear as mud? ??    I will actually be off the next two weeks as our daughter is coming in from [place] for Thanksgiving and staying awhile. But I will leave that frame at the  in our [facility] showroom and you can stop in when you are able to get it. You can just take the sm frame for free as it was a demo that we have not used and can just give it away. Try that one and see if it is more secure on your head. If that is the one you like you will have to order the sm frame from [email or web address].com going forward as we are not able to get it at our [facility] warehouse or through the other warehouse we contract with.

## 2023-11-29 ENCOUNTER — TRANSFERRED RECORDS (OUTPATIENT)
Dept: MULTI SPECIALTY CLINIC | Facility: CLINIC | Age: 48
End: 2023-11-29

## 2023-11-29 ENCOUNTER — TELEPHONE (OUTPATIENT)
Dept: SLEEP MEDICINE | Facility: CLINIC | Age: 48
End: 2023-11-29
Payer: COMMERCIAL

## 2023-11-29 NOTE — TELEPHONE ENCOUNTER
PT IS USING CPAP AT 40%.  LVM ASKING PT TO CALL IF THERE IS ANYTHING I CAN DO TO HELP HER.  TOLD HER THINGS LOOK GOOD AND KEEP USING CPAP EVERY DAY.

## 2024-01-11 DIAGNOSIS — E06.3 HYPOTHYROIDISM DUE TO HASHIMOTO'S THYROIDITIS: ICD-10-CM

## 2024-01-11 RX ORDER — LEVOTHYROXINE SODIUM 125 UG/1
TABLET ORAL
Qty: 90 TABLET | OUTPATIENT
Start: 2024-01-11

## 2024-01-11 RX ORDER — LEVOTHYROXINE SODIUM 125 UG/1
TABLET ORAL
Qty: 30 TABLET | Refills: 0 | Status: SHIPPED | OUTPATIENT
Start: 2024-01-11 | End: 2024-02-15

## 2024-01-26 ENCOUNTER — VIRTUAL VISIT (OUTPATIENT)
Dept: SLEEP MEDICINE | Facility: CLINIC | Age: 49
End: 2024-01-26
Payer: COMMERCIAL

## 2024-01-26 VITALS — BODY MASS INDEX: 30.73 KG/M2 | HEIGHT: 64 IN | WEIGHT: 180 LBS

## 2024-01-26 DIAGNOSIS — E66.811 OBESITY (BMI 30.0-34.9): ICD-10-CM

## 2024-01-26 DIAGNOSIS — G47.33 OSA (OBSTRUCTIVE SLEEP APNEA): Primary | ICD-10-CM

## 2024-01-26 PROCEDURE — 99213 OFFICE O/P EST LOW 20 MIN: CPT | Mod: 95 | Performed by: INTERNAL MEDICINE

## 2024-01-26 ASSESSMENT — SLEEP AND FATIGUE QUESTIONNAIRES
HOW LIKELY ARE YOU TO NOD OFF OR FALL ASLEEP WHILE SITTING AND TALKING TO SOMEONE: WOULD NEVER DOZE
HOW LIKELY ARE YOU TO NOD OFF OR FALL ASLEEP WHILE LYING DOWN TO REST IN THE AFTERNOON WHEN CIRCUMSTANCES PERMIT: SLIGHT CHANCE OF DOZING
HOW LIKELY ARE YOU TO NOD OFF OR FALL ASLEEP WHILE SITTING AND READING: SLIGHT CHANCE OF DOZING
HOW LIKELY ARE YOU TO NOD OFF OR FALL ASLEEP WHILE SITTING INACTIVE IN A PUBLIC PLACE: WOULD NEVER DOZE
HOW LIKELY ARE YOU TO NOD OFF OR FALL ASLEEP IN A CAR, WHILE STOPPED FOR A FEW MINUTES IN TRAFFIC: WOULD NEVER DOZE
HOW LIKELY ARE YOU TO NOD OFF OR FALL ASLEEP WHILE SITTING QUIETLY AFTER LUNCH WITHOUT ALCOHOL: WOULD NEVER DOZE
HOW LIKELY ARE YOU TO NOD OFF OR FALL ASLEEP WHEN YOU ARE A PASSENGER IN A CAR FOR AN HOUR WITHOUT A BREAK: SLIGHT CHANCE OF DOZING
HOW LIKELY ARE YOU TO NOD OFF OR FALL ASLEEP WHILE WATCHING TV: WOULD NEVER DOZE

## 2024-01-26 ASSESSMENT — PAIN SCALES - GENERAL: PAINLEVEL: NO PAIN (0)

## 2024-01-26 NOTE — NURSING NOTE
Has patient had flu shot for current/most recent flu season? If so, when? No      Is the patient currently in the state of [place]? YES    Visit mode:VIDEO    If the visit is dropped, the patient can be reconnected by: VIDEO VISIT: Send to e-mail at: [email or web address]    Will anyone else be joining the visit? NO  (If patient encounters technical issues they should call [phone]    How would you like to obtain your AVS? MyChart    Are changes needed to the allergy or medication list? No    Reason for visit: RECHECK    Tracee MELENDEZ

## 2024-01-26 NOTE — PROGRESS NOTES
Virtual Visit Details    Type of service:  Video Visit     Originating Location (pt. Location): Home  Distant Location (provider location):  Off-site  Platform used for Video Visit: [employer]    Additional 15 minutes on the date of service was spent performing the following:    -Preparing to see the patient  -Obtaining and/or reviewing separately obtained history   -Ordering medications, tests, or procedures   -Documenting clinical information in the electronic or other health record     Thank you for the opportunity to participate in the care of Deysi Douglas.     She is a 48 year old y/o female patient who comes to the sleep medicine clinic for follow up. The patient was diagnosed with DAVE on 10/31/2022 (AHI=5.4).This is the patient's 1st clinical visit since getting started on CPAP therapy. The patient states that since starting CPAP therapy, she reports that she does feel much more rested upon awakening. She is glad she tried CPAP. However, her current mask is causing some skin irritation and discomfort affecting her usage.     Assessment and Plan:  In summary Deysi Douglas is a 48 year old year old female who is here for follow up.    1. DAVE (obstructive sleep apnea)  I congratulated the patient on her excellent CPAP usage. I will narrow her pressure to a set pressure of 8 cwp. I also recommend that she contact the DME to get another mask fitting. She expressed understanding and agreed.  - COMPREHENSIVE DME    2. Obesity (BMI 30.0-34.9)  Healthy weigh management is recommended as part of the long term goal to improve DAVE. I will give the patient a hand out on the topic in the AVS.     Compliance Download data for 30 Days:  Compliance:77%  Pressure setting:APAP 5-15 cwp  95% pressure:8.3 cwp  Leak:Minimal  Residual AHI:5.7 events per hour  Mask Tolerance: Poor  Skin irritation:Yes  DME:[facility]    Lab reviewed: Discussed with patient.    Sleep-Wake Cycle:    The patient likes to initiate sleep at  around 11 PM with a sleep latency of less than 20 minutes. The patient has zero nocturnal awakenings. Final wake up time is around 6:30 AM.    DEMARCO:  DEMARCO Total Score: 0  Total score - [place]: 3 (2024  7:42 AM)       Patient Active Problem List   Diagnosis    Septate Uterus    CARDIOVASCULAR SCREENING; LDL GOAL LESS THAN 160    Mild major depression (H)    MARCELO (generalized anxiety disorder)    Hypothyroidism due to Hashimoto's thyroiditis    Binge-eating disorder, moderate    Alcohol use disorder, mild, in sustained remission    ASCUS of cervix with negative high risk HPV    Class 1 obesity due to excess calories without serious comorbidity with body mass index (BMI) of 32.0 to 32.9 in adult       Past Medical History:   Diagnosis Date    ASCUS of cervix with negative high risk HPV 2021-2007 NIL annual paps ,  NIL paps 14 NIL pap, neg HPV 21 ASCUS, neg HPV. Plan: cotest in 3 years    Hypothyroidism     Septate Uterus        Past Surgical History:   Procedure Laterality Date    C/SECTION, LOW TRANSVERSE  2011    repeat    D & C  ,     missed Ab    ESOPHAGOSCOPY, GASTROSCOPY, DUODENOSCOPY (EGD), COMBINED N/A 1/3/2018    Procedure: COMBINED ESOPHAGOSCOPY, GASTROSCOPY, DUODENOSCOPY (EGD), BIOPSY SINGLE OR MULTIPLE;  Upper Endo;  Surgeon: David Kumari MD;  Location:  OR    [facility]  DELIVERY ONLY         Current Outpatient Medications   Medication Sig Dispense Refill    escitalopram (LEXAPRO) 10 MG tablet Take 10 mg by mouth daily      levothyroxine (SYNTHROID/LEVOTHROID) 125 MCG tablet TAKE 1 TABLET(125 MCG) BY MOUTH DAILY 30 tablet 0    ZYRTEC 10 MG PO TABS 1 TABLET DAILY      valACYclovir (VALTREX) 1000 mg tablet Take 2 tablets (2,000 mg) by mouth 2 times daily (Patient not taking: Reported on 2024) 4 tablet 11       Allergies   Allergen Reactions    Shellfish Allergy Anaphylaxis    Peanuts [Nuts] Difficulty breathing    Phenylephrine      Cardiac  "arrhythmia       Visual  Exam:  GEN: NAD,  Psych: normal mood, normal affect    Labs/Studies:      No results found for: \"PH\", \"PHARTERIAL\", \"PO2\", \"QO2IRGSITIV\", \"SAT\", \"PCO2\", \"HCO3\", \"BASEEXCESS\", \"DANILO\", \"BEB\"  Lab Results   Component Value Date    TSH 0.58 03/18/2022    TSH 0.55 03/31/2021     Lab Results   Component Value Date    GLC 96 03/18/2022    GLC 78 10/19/2020     Lab Results   Component Value Date    HGB 12.8 03/18/2022    HGB 13.8 12/02/2016     Lab Results   Component Value Date    BUN 14 03/18/2022    BUN 12 10/19/2020    CR 0.82 03/18/2022    CR 0.82 10/19/2020     Lab Results   Component Value Date    AST 12 03/18/2022    AST 16 10/19/2020    ALT 27 03/18/2022    ALT 26 10/19/2020    ALKPHOS 61 03/18/2022    ALKPHOS 59 10/19/2020    BILITOTAL 0.4 03/18/2022    BILITOTAL 0.3 10/19/2020     Lab Results   Component Value Date    UAMP  05/09/2017     Negative   Cutoff for a negative amphetamine is 500 ng/mL or less.      UBARB  05/09/2017     Negative   Cutoff for a negative barbiturate is 200 ng/mL or less.      BENZODIAZEUR  05/09/2017     Positive   Cutoff for a positive benzodiazepine is greater than 200 ng/mL. This is an   unconfirmed screening result to be used for medical purposes only.      UCANN  05/09/2017     Negative   Cutoff for a negative cannabinoid is 50 ng/mL or less.      UCOC  05/09/2017     Negative   Cutoff for a negative cocaine is 300 ng/mL or less.      OPIT  05/09/2017     Negative   Cutoff for a negative opiate is 300 ng/mL or less.         Recent Labs   Lab Test 03/18/22  0741 10/19/20  1221    138   POTASSIUM 4.0 4.1   CHLORIDE 109 107   CO2 27 26   ANIONGAP 4 5   GLC 96 78   BUN 14 12   CR 0.82 0.82   GERARDO 8.3* 8.5       Ferritin   Date Value Ref Range Status   12/02/2016 17 12 - 150 ng/mL Final       I reviewed the efficacy and compliance report from her device. Data summarized on the HPI and the PAP compliance flow sheet.     Patient verbalized understanding of " these issues, agrees with the plan and all questions were answered today. Patient was given an opportuntity to voice any other symptoms or concerns not listed above. Patient did not have any other symptoms or concerns.      Matti Rodriguez DO  Board Certified in Internal Medicine and Sleep Medicine    (Note created with Dragon voice recognition and unintended spelling errors and word substitutions may occur)     Audio and visual devices were used for this virtual clinic visit with permission from patient.

## 2024-01-29 ENCOUNTER — DOCUMENTATION ONLY (OUTPATIENT)
Dept: SLEEP MEDICINE | Facility: CLINIC | Age: 49
End: 2024-01-29
Payer: COMMERCIAL

## 2024-01-29 NOTE — PROGRESS NOTES
DATE: 01/29/2024  LOCATION OF MASK FITTING: [place]  REASON FOR MASK FITTING: IRRITATION / REDNESS FROM ESON 2 CUSHION  DISCUSSED/VIEWED THE FOLLOWING MASKS: AIRTOUCH N20 / DW NASAL CUSHION    MASK AND SIZE SELECTED: DW NASAL CUSHION SIZE S  MASK CLARIFICATION NEEDED: N    DOES PATIENT WEAR A MEDICAL DEVICE THAT WILL BE AFFECTED BY THE RESPIRONICS OR RESMED MAGNETIC MASK CONTRAINDICATION (IF YES, SELECT A MASK THAT DOES NOT HAVE MAGNETS)? N    *PATIENT OPTED TO TRY THE DREAMWEAR OPTIONS BEFORE BILLING FOR A WHOLE NEW MASK AGAIN.

## 2024-02-15 DIAGNOSIS — E06.3 HYPOTHYROIDISM DUE TO HASHIMOTO'S THYROIDITIS: ICD-10-CM

## 2024-02-15 RX ORDER — LEVOTHYROXINE SODIUM 125 UG/1
TABLET ORAL
Qty: 30 TABLET | Refills: 0 | Status: SHIPPED | OUTPATIENT
Start: 2024-02-15 | End: 2024-03-05

## 2024-02-15 NOTE — TELEPHONE ENCOUNTER
Patient called stated that she was suppose to have a visit with Annia but showed up to the wrong clinic and was not able to make it to the  clinic on time. Patient stated she rescheduled but out of her thyroid med. I told her we can shana one 30 day refill but she has to be seen for any further refills especially because this med depends on labs. Pt agreeable and understanding.       Thanks,  KELLIE Lopez  [facility]

## 2024-03-05 ENCOUNTER — OFFICE VISIT (OUTPATIENT)
Dept: FAMILY MEDICINE | Facility: CLINIC | Age: 49
End: 2024-03-05
Payer: COMMERCIAL

## 2024-03-05 VITALS
BODY MASS INDEX: 32.85 KG/M2 | DIASTOLIC BLOOD PRESSURE: 74 MMHG | OXYGEN SATURATION: 98 % | WEIGHT: 185.4 LBS | RESPIRATION RATE: 20 BRPM | HEART RATE: 57 BPM | SYSTOLIC BLOOD PRESSURE: 109 MMHG | HEIGHT: 63 IN | TEMPERATURE: 97.8 F

## 2024-03-05 DIAGNOSIS — E06.3 HYPOTHYROIDISM DUE TO HASHIMOTO'S THYROIDITIS: ICD-10-CM

## 2024-03-05 DIAGNOSIS — Z12.4 CERVICAL CANCER SCREENING: ICD-10-CM

## 2024-03-05 DIAGNOSIS — F41.1 GAD (GENERALIZED ANXIETY DISORDER): ICD-10-CM

## 2024-03-05 DIAGNOSIS — Z00.00 ROUTINE GENERAL MEDICAL EXAMINATION AT A HEALTH CARE FACILITY: Primary | ICD-10-CM

## 2024-03-05 DIAGNOSIS — Z13.220 LIPID SCREENING: ICD-10-CM

## 2024-03-05 DIAGNOSIS — G47.33 OBSTRUCTIVE SLEEP APNEA SYNDROME: ICD-10-CM

## 2024-03-05 DIAGNOSIS — J30.2 SEASONAL ALLERGIC RHINITIS, UNSPECIFIED TRIGGER: ICD-10-CM

## 2024-03-05 DIAGNOSIS — B00.9 HERPES SIMPLEX VIRUS INFECTION: ICD-10-CM

## 2024-03-05 DIAGNOSIS — F33.42 RECURRENT MAJOR DEPRESSIVE DISORDER, IN FULL REMISSION (H): ICD-10-CM

## 2024-03-05 DIAGNOSIS — Z23 NEED FOR VACCINATION: ICD-10-CM

## 2024-03-05 DIAGNOSIS — Z13.1 SCREENING FOR DIABETES MELLITUS: ICD-10-CM

## 2024-03-05 DIAGNOSIS — Z13.21 ENCOUNTER FOR VITAMIN DEFICIENCY SCREENING: ICD-10-CM

## 2024-03-05 PROCEDURE — 90686 IIV4 VACC NO PRSV 0.5 ML IM: CPT | Performed by: NURSE PRACTITIONER

## 2024-03-05 PROCEDURE — 82306 VITAMIN D 25 HYDROXY: CPT | Performed by: NURSE PRACTITIONER

## 2024-03-05 PROCEDURE — 90715 TDAP VACCINE 7 YRS/> IM: CPT | Performed by: NURSE PRACTITIONER

## 2024-03-05 PROCEDURE — 90480 ADMN SARSCOV2 VAC 1/ONLY CMP: CPT | Performed by: NURSE PRACTITIONER

## 2024-03-05 PROCEDURE — 90472 IMMUNIZATION ADMIN EACH ADD: CPT | Performed by: NURSE PRACTITIONER

## 2024-03-05 PROCEDURE — 96127 BRIEF EMOTIONAL/BEHAV ASSMT: CPT | Performed by: NURSE PRACTITIONER

## 2024-03-05 PROCEDURE — 80061 LIPID PANEL: CPT | Performed by: NURSE PRACTITIONER

## 2024-03-05 PROCEDURE — 80048 BASIC METABOLIC PNL TOTAL CA: CPT | Performed by: NURSE PRACTITIONER

## 2024-03-05 PROCEDURE — 90750 HZV VACC RECOMBINANT IM: CPT | Performed by: NURSE PRACTITIONER

## 2024-03-05 PROCEDURE — 87624 HPV HI-RISK TYP POOLED RSLT: CPT | Performed by: NURSE PRACTITIONER

## 2024-03-05 PROCEDURE — 90471 IMMUNIZATION ADMIN: CPT | Performed by: NURSE PRACTITIONER

## 2024-03-05 PROCEDURE — 99396 PREV VISIT EST AGE 40-64: CPT | Mod: 25 | Performed by: NURSE PRACTITIONER

## 2024-03-05 PROCEDURE — 36415 COLL VENOUS BLD VENIPUNCTURE: CPT | Performed by: NURSE PRACTITIONER

## 2024-03-05 PROCEDURE — 91320 SARSCV2 VAC 30MCG TRS-SUC IM: CPT | Performed by: NURSE PRACTITIONER

## 2024-03-05 PROCEDURE — G0145 SCR C/V CYTO,THINLAYER,RESCR: HCPCS | Performed by: NURSE PRACTITIONER

## 2024-03-05 PROCEDURE — 99214 OFFICE O/P EST MOD 30 MIN: CPT | Mod: 25 | Performed by: NURSE PRACTITIONER

## 2024-03-05 PROCEDURE — 84443 ASSAY THYROID STIM HORMONE: CPT | Performed by: NURSE PRACTITIONER

## 2024-03-05 RX ORDER — LEVOTHYROXINE SODIUM 125 UG/1
TABLET ORAL
Qty: 90 TABLET | Refills: 3 | Status: SHIPPED | OUTPATIENT
Start: 2024-03-05

## 2024-03-05 RX ORDER — VALACYCLOVIR HYDROCHLORIDE 500 MG/1
500 TABLET, FILM COATED ORAL 2 TIMES DAILY
Qty: 6 TABLET | Refills: 5 | Status: SHIPPED | OUTPATIENT
Start: 2024-03-05 | End: 2024-03-08

## 2024-03-05 RX ORDER — VALACYCLOVIR HYDROCHLORIDE 1 G/1
2000 TABLET, FILM COATED ORAL 2 TIMES DAILY
Qty: 4 TABLET | Refills: 11 | Status: CANCELLED | OUTPATIENT
Start: 2024-03-05

## 2024-03-05 RX ORDER — ESTRADIOL 10 UG/1
INSERT VAGINAL
COMMUNITY
Start: 2023-03-17 | End: 2024-04-25

## 2024-03-05 SDOH — HEALTH STABILITY: PHYSICAL HEALTH: ON AVERAGE, HOW MANY DAYS PER WEEK DO YOU ENGAGE IN MODERATE TO STRENUOUS EXERCISE (LIKE A BRISK WALK)?: 6 DAYS

## 2024-03-05 ASSESSMENT — ANXIETY QUESTIONNAIRES
7. FEELING AFRAID AS IF SOMETHING AWFUL MIGHT HAPPEN: NOT AT ALL
IF YOU CHECKED OFF ANY PROBLEMS ON THIS QUESTIONNAIRE, HOW DIFFICULT HAVE THESE PROBLEMS MADE IT FOR YOU TO DO YOUR WORK, TAKE CARE OF THINGS AT HOME, OR GET ALONG WITH OTHER PEOPLE: NOT DIFFICULT AT ALL
4. TROUBLE RELAXING: NOT AT ALL
1. FEELING NERVOUS, ANXIOUS, OR ON EDGE: NOT AT ALL
2. NOT BEING ABLE TO STOP OR CONTROL WORRYING: NOT AT ALL
GAD7 TOTAL SCORE: 0
8. IF YOU CHECKED OFF ANY PROBLEMS, HOW DIFFICULT HAVE THESE MADE IT FOR YOU TO DO YOUR WORK, TAKE CARE OF THINGS AT HOME, OR GET ALONG WITH OTHER PEOPLE?: NOT DIFFICULT AT ALL
GAD7 TOTAL SCORE: 0
3. WORRYING TOO MUCH ABOUT DIFFERENT THINGS: NOT AT ALL
GAD7 TOTAL SCORE: 0
6. BECOMING EASILY ANNOYED OR IRRITABLE: NOT AT ALL
5. BEING SO RESTLESS THAT IT IS HARD TO SIT STILL: NOT AT ALL
7. FEELING AFRAID AS IF SOMETHING AWFUL MIGHT HAPPEN: NOT AT ALL

## 2024-03-05 ASSESSMENT — PAIN SCALES - GENERAL: PAINLEVEL: NO PAIN (0)

## 2024-03-05 ASSESSMENT — PATIENT HEALTH QUESTIONNAIRE - PHQ9
10. IF YOU CHECKED OFF ANY PROBLEMS, HOW DIFFICULT HAVE THESE PROBLEMS MADE IT FOR YOU TO DO YOUR WORK, TAKE CARE OF THINGS AT HOME, OR GET ALONG WITH OTHER PEOPLE: NOT DIFFICULT AT ALL
SUM OF ALL RESPONSES TO PHQ QUESTIONS 1-9: 0
SUM OF ALL RESPONSES TO PHQ QUESTIONS 1-9: 0

## 2024-03-05 ASSESSMENT — SOCIAL DETERMINANTS OF HEALTH (SDOH): HOW OFTEN DO YOU GET TOGETHER WITH FRIENDS OR RELATIVES?: MORE THAN THREE TIMES A WEEK

## 2024-03-05 NOTE — PATIENT INSTRUCTIONS
Preventive Care Advice   This is general advice given by our system to help you stay healthy. However, your care team may have specific advice just for you. Please talk to your care team about your preventive care needs.  Nutrition  Eat 5 or more servings of fruits and vegetables each day.  Try wheat bread, brown rice and whole grain pasta (instead of white bread, rice, and pasta).  Get enough calcium and vitamin D. Check the label on foods and aim for 100% of the RDA (recommended daily allowance).  Lifestyle  Exercise at least 150 minutes each week   (30 minutes a day, 5 days a week).  Do muscle strengthening activities 2 days a week. These help control your weight and prevent disease.  No smoking.  Wear sunscreen to prevent skin cancer.  Have a dental exam and cleaning every 6 months.  Yearly exams  See your health care team every year to talk about:  Any changes in your health.  Any medicines your care team has prescribed.  Preventive care, family planning, and ways to prevent chronic diseases.  Shots (vaccines)   HPV shots (up to age 26), if you've never had them before.  Hepatitis B shots (up to age 59), if you've never had them before.  COVID-19 shot: Get this shot when it's due.  Flu shot: Get a flu shot every year.  Tetanus shot: Get a tetanus shot every 10 years.  Pneumococcal, hepatitis A, and RSV shots: Ask your care team if you need these based on your risk.  Shingles shot (for age 50 and up).  General health tests  Diabetes screening:  Starting at age 35, Get screened for diabetes at least every 3 years.  If you are younger than age 35, ask your care team if you should be screened for diabetes.  Cholesterol test: At age 39, start having a cholesterol test every 5 years, or more often if advised.  Bone density scan (DEXA): At age 50, ask your care team if you should have this scan for osteoporosis (brittle bones).  Hepatitis C: Get tested at least once in your life.  STIs (sexually transmitted  infections)  Before age 24: Ask your care team if you should be screened for STIs.  After age 24: Get screened for STIs if you're at risk. You are at risk for STIs (including HIV) if:  You are sexually active with more than one person.  You don't use condoms every time.  You or a partner was diagnosed with a sexually transmitted infection.  If you are at risk for HIV, ask about PrEP medicine to prevent HIV.  Get tested for HIV at least once in your life, whether you are at risk for HIV or not.  Cancer screening tests  Cervical cancer screening: If you have a cervix, begin getting regular cervical cancer screening tests at age 21. Most people who have regular screenings with normal results can stop after age 65. Talk about this with your provider.  Breast cancer scan (mammogram): If you've ever had breasts, begin having regular mammograms starting at age 40. This is a scan to check for breast cancer.  Colon cancer screening: It is important to start screening for colon cancer at age 45.  Have a colonoscopy test every 10 years (or more often if you're at risk) Or, ask your provider about stool tests like a FIT test every year or Cologuard test every 3 years.  To learn more about your testing options, visit: https://www.[employer]/635470.pdf.  For help making a decision, visit: https://bit.ly/oc63156.  Prostate cancer screening test: If you have a prostate and are age 55 to 69, ask your provider if you would benefit from a yearly prostate cancer screening test.  Lung cancer screening: If you are a current or former smoker age 50 to 80, ask your care team if ongoing lung cancer screenings are right for you.  For informational purposes only. Not to replace the advice of your health care provider. Copyright   2023 [employer]. All rights reserved. Clinically reviewed by the [facility] Transitions Program. Fundation [id number] - REV 01/24.

## 2024-03-05 NOTE — PROGRESS NOTES
Preventive Care Visit  [facility]  Bernarda Villa DNP, Family Medicine  Mar 5, 2024    Assessment & Plan     Routine general medical examination at a health care facility  New to this provider.     Hypothyroidism due to Hashimoto's thyroiditis  Stable in the past. Repeat labs done today. Refills sent to pharmacy.     - TSH with free T4 reflex  - levothyroxine (SYNTHROID/LEVOTHROID) 125 MCG tablet; TAKE 1 TABLET(125 MCG) BY MOUTH DAILY    Seasonal allergic rhinitis, unspecified trigger  Chronic, worsening. She would like to do shots again. Referral placed.     - Adult Allergy/Asthma  Referral; Future    Recurrent major depressive disorder, in full remission (H24)  Stable on lexapro. Follows psych.     MARCELO (generalized anxiety disorder)  Stable on lexapro. Follows psych.     Obstructive sleep apnea syndrome  Stable with CPAP.     Herpes simplex virus infection  States she seldomly has outbreaks. She does want a refill of valtrex for future outbreaks.     - valACYclovir (VALTREX) 500 MG tablet; Take 1 tablet (500 mg) by mouth 2 times daily for 3 days ; start ASAP within 24 hours of symptom onset    Encounter for vitamin deficiency screening  Screening.     - Vitamin D Deficiency    Screening for diabetes mellitus  Screening.     - Basic metabolic panel  (Ca, Cl, CO2, Creat, Gluc, K, Na, BUN)    Lipid screening  Screening.     - Lipid panel reflex to direct LDL Non-fasting    Cervical cancer screening  Screening. Last PAP abnormal 2021.     - Pap Screen with HPV - recommended age 30 - 65 years    Need for vaccination  Given.   - TDAP 10-64Y (ADACEL,BOOSTRIX)  - ZOSTER RECOMBINANT ADJUVANTED (SHINGRIX)  - COVID-19 12+ (2023-24) (PFIZER)  - INFLUENZA VACCINE >6 MONTHS (AFLURIA/FLUZONE)    Patient has been advised of split billing requirements and indicates understanding: No  Ordering of each unique test  Prescription drug management        BMI  Estimated body mass index is 32.74 kg/m   "as calculated from the following:    Height as of this encounter: 1.603 m (5' 3.1\").    Weight as of this encounter: 84.1 kg (185 lb 6.4 oz).   Weight management plan: Discussed healthy diet and exercise guidelines    Counseling  Appropriate preventive services were discussed with this patient, including applicable screening as appropriate for fall prevention, nutrition, physical activity, Tobacco-use cessation, weight loss and cognition.  Checklist reviewing preventive services available has been given to the patient.  Reviewed patient's diet, addressing concerns and/or questions.       See Patient Instructions    Nahomi Jo is a 49 year old, presenting for the following:  Physical and Thyroid Problem        3/5/2024     8:28 AM   Additional Questions   Roomed by Shira   Accompanied by none         3/5/2024     8:28 AM   Patient Reported Additional Medications   Patient reports taking the following new medications none        Via the Health Maintenance questionnaire, the patient has reported the following services have been completed -Colonscopy, this information has been sent to the abstraction team.  Health Care Directive  Patient does not have a Health Care Directive or Living Will:     Imm/Inj      Hypothyroidism Follow-up    Since last visit, patient describes the following symptoms: Weight stable, no hair loss, no skin changes, no constipation, no loose stools  Medication Followup of levothyroxine  Taking Medication as prescribed: yes  Side Effects:  None  Medication Helping Symptoms:  yes        Additional provider notes: Patient presents in clinic for annual physical and the following:    Hypothyroidism: stable on synthroid dose. Needing labs today.    Depression/anxiety: stable on lexapro. Follows with psych.     Alcohol: in remission. No concerns.     Eating disorder: resolved. No further concerns.    Hx of ASCUS: repeating Pap today. Last pap was 2021 and was abnormal (advised repeat in 3 " years)    Perimenopause: periods are becoming more irregular. She has started to have some hot flashes, but none that are unbearable. She uses estradiol occasionally when she remembers for vaginal dryness.     Seasonal allergies: takes zyrtec. Hx of shots in the past. Wanting referral for allergy shots again.     DAVE: mild per her report. Wears CPAP at night with no issues.     Herpes: has a couple outbreaks a year. She hasn't needed a refill of valtrex in a while.     11/29/23 - [facility] colonoscopy states it was normal and she was instructed to follow-up in 10 years.     Exercise: skiing 4x/week, tennis twice a week. Walks a lot. She does strength training as well.     Allergies   Allergen Reactions    Shellfish Allergy Anaphylaxis    Peanuts [Nuts] Difficulty breathing    Phenylephrine      Cardiac arrhythmia       Current Outpatient Medications   Medication    escitalopram (LEXAPRO) 10 MG tablet    levothyroxine (SYNTHROID/LEVOTHROID) 125 MCG tablet    ZYRTEC 10 MG PO TABS    valACYclovir (VALTREX) 1000 mg tablet     No current facility-administered medications for this visit.       Past Medical History:   Diagnosis Date    ASCUS of cervix with negative high risk HPV 5/25/2021 2001-2007 NIL annual paps 2010, 2012 NIL paps 2/11/14 NIL pap, neg HPV 5/25/21 ASCUS, neg HPV. Plan: cotest in 3 years    Hypothyroidism     Septate Uterus             3/5/2024   General Health   How would you rate your overall physical health? Excellent   Feel stress (tense, anxious, or unable to sleep) Not at all         3/5/2024   Nutrition   Three or more servings of calcium each day? Yes   Diet: Gluten-free/reduced   How many servings of fruit and vegetables per day? 4 or more   How many sweetened beverages each day? 0-1         3/5/2024   Exercise   Days per week of moderate/strenous exercise 6 days         3/5/2024   Social Factors   Frequency of gathering with friends or relatives More than three times a week   Worry food won't last  until get money to buy more No   Food not last or not have enough money for food? No   Do you have housing?  Yes   Are you worried about losing your housing? No   Lack of transportation? No   Unable to get utilities (heat,electricity)? No         3/5/2024   Fall Risk   Fallen 2 or more times in the past year? No   Trouble with walking or balance? No          3/5/2024   Dental   Dentist two times every year? Yes         3/5/2024   TB Screening   Were you born outside of [place]?  No       Today's PHQ-9 Score:       3/5/2024     8:17 AM   PHQ-9 SCORE   PHQ-9 Total Score MyChart 0   PHQ-9 Total Score 0         3/5/2024   Substance Use   Alcohol more than 3/day or more than 7/wk No   Do you use any other substances recreationally? No     Social History     Tobacco Use    Smoking status: Never     Passive exposure: Never    Smokeless tobacco: Never   Vaping Use    Vaping Use: Never used   Substance Use Topics    Alcohol use: No     Alcohol/week: 0.0 standard drinks of alcohol    Drug use: No             3/5/2024   Breast Cancer Screening   Family history of breast, colon, or ovarian cancer? No / Unknown         11/14/2023   LAST FHS-7 RESULTS   1st degree relative breast or ovarian cancer No   Any relative bilateral breast cancer No   Any male have breast cancer No   Any ONE woman have BOTH breast AND ovarian cancer No   Any woman with breast cancer before 50yrs No   2 or more relatives with breast AND/OR ovarian cancer No   2 or more relatives with breast AND/OR bowel cancer No        Mammogram Screening - Mammogram every 1-2 years updated in Health Maintenance based on mutual decision making        3/5/2024   STI Screening   New sexual partner(s) since last STI/HIV test? No     History of abnormal Pap smear: see below. Pap done today. Will repeat based on results and recommendations by PAP team.         Latest Ref Rng & Units 5/25/2021     9:58 AM 5/25/2021     9:40 AM 2/11/2014    12:00 AM   PAP / HPV   PAP (Historical)   ASC-US   NIL    HPV 16 DNA NEG^Negative  Negative     HPV 18 DNA NEG^Negative  Negative     Other HR HPV NEG^Negative  Negative       ASCVD Risk   The 10-year ASCVD risk score (Kimberly TRINIDAD, et al., 2019) is: 1.1%    Values used to calculate the score:      Age: 49 years      Sex: Female      Is Non- : No      Diabetic: No      Tobacco smoker: No      Systolic Blood Pressure: 109 mmHg      Is BP treated: No      HDL Cholesterol: 57 mg/dL      Total Cholesterol: 229 mg/dL    Fracture Risk Assessment Tool  Link to Frax Calculator  Use the information below to complete the Frax calculator  : 1975  Sex: female  Weight (kg): 84.1 kg (actual weight)  Height (cm): 160.3 cm  Previous Fragility Fracture:  No  History of parent with fractured hip:  No  Current Smoking:  No  Patient has been on glucocorticoids for more than 3 months (5mg/day or more): No  Rheumatoid Arthritis on Problem List:  No  Secondary Osteoporosis on Problem List:  No  Consumes 3 or more units of alcohol per day: No  Femoral Neck BMD (g/cm2)           Reviewed and updated as needed this visit by Provider                    Past Medical History:   Diagnosis Date    ASCUS of cervix with negative high risk HPV 2021-2007 NIL annual paps ,  NIL paps 14 NIL pap, neg HPV 21 ASCUS, neg HPV. Plan: cotest in 3 years    Hypothyroidism     Septate Uterus      Past Surgical History:   Procedure Laterality Date    C/SECTION, LOW TRANSVERSE  2011    repeat    D & C  ,     missed Ab    ESOPHAGOSCOPY, GASTROSCOPY, DUODENOSCOPY (EGD), COMBINED N/A 1/3/2018    Procedure: COMBINED ESOPHAGOSCOPY, GASTROSCOPY, DUODENOSCOPY (EGD), BIOPSY SINGLE OR MULTIPLE;  Upper Endo;  Surgeon: David Kumari MD;  Location:  OR    [facility]  DELIVERY ONLY       OB History    Para Term  AB Living   4 2 2 0 2 2   SAB IAB Ectopic Multiple Live Births   2 0 0 0 2      # Outcome Date GA  "Lbr Gabriel/2nd Weight Sex Delivery Anes PTL Lv   4 Term 11/20/10 39w0d  4.111 kg (9 lb 1 oz)  CS-LTranv   ROYAL      Apgar1: 9  Apgar5: 9   3 SAB 11/09/09     SAB         Birth Comments: D&C   2 Term 11/13/07 38w0d  3.6 kg (7 lb 15 oz) F CS-Unspec   ROYAL      Birth Comments: Breech      Name: Pau Sheridan SAB 06/2006     SAB   DEC      Birth Comments: D&C         Review of Systems  Constitutional, HEENT, cardiovascular, pulmonary, gi and gu systems are negative, except as otherwise noted.     Objective    Exam  /74 (BP Location: Right arm, Patient Position: Sitting, Cuff Size: Adult Large)   Pulse 57   Temp 97.8  F (36.6  C) (Oral)   Resp 20   Ht 1.603 m (5' 3.1\")   Wt 84.1 kg (185 lb 6.4 oz)   LMP 01/02/2024 (Exact Date)   SpO2 98%   Breastfeeding No   BMI 32.74 kg/m     Estimated body mass index is 32.74 kg/m  as calculated from the following:    Height as of this encounter: 1.603 m (5' 3.1\").    Weight as of this encounter: 84.1 kg (185 lb 6.4 oz).    Physical Exam  GENERAL: alert and no distress  EYES: Eyes grossly normal to inspection, PERRL and conjunctivae and sclerae normal  HENT: ear canals and TM's normal, nose and mouth without ulcers or lesions  NECK: no adenopathy, no asymmetry, masses, or scars  RESP: lungs clear to auscultation - no rales, rhonchi or wheezes  BREAST: normal without masses, tenderness or nipple discharge and no palpable axillary masses or adenopathy  CV: regular rate and rhythm, normal S1 S2, no S3 or S4, no murmur, click or rub, no peripheral edema  ABDOMEN: soft, nontender, no hepatosplenomegaly, no masses and bowel sounds normal   (female): normal female external genitalia, normal urethral meatus, normal vaginal mucosa; mild cervical irritation/erythema, friable after swabbing  MS: no gross musculoskeletal defects noted, no edema  SKIN: no suspicious lesions or rashes  NEURO: Normal strength and tone, mentation intact and speech normal  PSYCH: mentation appears normal, " affect normal/bright      Signed Electronically by: Bernarda Villa DNP    Answers submitted by the patient for this visit:  Patient Health Questionnaire (Submitted on 3/5/2024)  If you checked off any problems, how difficult have these problems made it for you to do your work, take care of things at home, or get along with other people?: Not difficult at all  PHQ9 TOTAL SCORE: 0  MARCELO-7 (Submitted on 3/5/2024)  MARCELO 7 TOTAL SCORE: 0

## 2024-03-05 NOTE — NURSING NOTE
Prior to immunization administration, verified patients identity using patient s name and date of birth. Please see Immunization Activity for additional information.     Screening Questionnaire for Adult Immunization    Are you sick today?   No   Do you have allergies to medications, food, a vaccine component or latex?   No   Have you ever had a serious reaction after receiving a vaccination?   No   Do you have a long-term health problem with heart, lung, kidney, or metabolic disease (e.g., diabetes), asthma, a blood disorder, no spleen, complement component deficiency, a cochlear implant, or a spinal fluid leak?  Are you on long-term aspirin therapy?   No   Do you have cancer, leukemia, HIV/AIDS, or any other immune system problem?   No   Do you have a parent, brother, or sister with an immune system problem?   No   In the past 3 months, have you taken medications that affect  your immune system, such as prednisone, other steroids, or anticancer drugs; drugs for the treatment of rheumatoid arthritis, Crohn s disease, or psoriasis; or have you had radiation treatments?   No   Have you had a seizure, or a brain or other nervous system problem?   No   During the past year, have you received a transfusion of blood or blood    products, or been given immune (gamma) globulin or antiviral drug?   No   For women: Are you pregnant or is there a chance you could become       pregnant during the next month?   No   Have you received any vaccinations in the past 4 weeks?   No     Immunization questionnaire answers were all negative.      Patient instructed to remain in clinic for 15 minutes afterwards, and to report any adverse reactions.     Screening performed by Shira Fung MA on 3/5/2024 at 8:36 AM.

## 2024-03-06 LAB
ANION GAP SERPL CALCULATED.3IONS-SCNC: 9 MMOL/L (ref 7–15)
BUN SERPL-MCNC: 18.3 MG/DL (ref 6–20)
CALCIUM SERPL-MCNC: 9.3 MG/DL (ref 8.6–10)
CHLORIDE SERPL-SCNC: 101 MMOL/L (ref 98–107)
CHOLEST SERPL-MCNC: 273 MG/DL
CREAT SERPL-MCNC: 0.89 MG/DL (ref 0.51–0.95)
DEPRECATED HCO3 PLAS-SCNC: 29 MMOL/L (ref 22–29)
EGFRCR SERPLBLD CKD-EPI 2021: 78 ML/MIN/1.73M2
FASTING STATUS PATIENT QL REPORTED: NO
GLUCOSE SERPL-MCNC: 89 MG/DL (ref 70–99)
HDLC SERPL-MCNC: 61 MG/DL
LDLC SERPL CALC-MCNC: 194 MG/DL
NONHDLC SERPL-MCNC: 212 MG/DL
POTASSIUM SERPL-SCNC: 4.6 MMOL/L (ref 3.4–5.3)
SODIUM SERPL-SCNC: 139 MMOL/L (ref 135–145)
TRIGL SERPL-MCNC: 91 MG/DL
TSH SERPL DL<=0.005 MIU/L-ACNC: 1.88 UIU/ML (ref 0.3–4.2)
VIT D+METAB SERPL-MCNC: 35 NG/ML (ref 20–50)

## 2024-03-08 LAB
BKR LAB AP GYN ADEQUACY: NORMAL
BKR LAB AP GYN INTERPRETATION: NORMAL
BKR LAB AP HPV REFLEX: NORMAL
BKR LAB AP LMP: NORMAL
BKR LAB AP PREVIOUS ABNL DX: NORMAL
BKR LAB AP PREVIOUS ABNORMAL: NORMAL
PATH REPORT.COMMENTS IMP SPEC: NORMAL
PATH REPORT.COMMENTS IMP SPEC: NORMAL
PATH REPORT.RELEVANT HX SPEC: NORMAL

## 2024-03-11 LAB
HUMAN PAPILLOMA VIRUS 16 DNA: NEGATIVE
HUMAN PAPILLOMA VIRUS 18 DNA: NEGATIVE
HUMAN PAPILLOMA VIRUS FINAL DIAGNOSIS: NORMAL
HUMAN PAPILLOMA VIRUS OTHER HR: NEGATIVE

## 2024-03-12 PROBLEM — R87.610 ASCUS OF CERVIX WITH NEGATIVE HIGH RISK HPV: Status: ACTIVE | Noted: 2021-05-25

## 2024-04-23 ENCOUNTER — MYC REFILL (OUTPATIENT)
Dept: FAMILY MEDICINE | Facility: CLINIC | Age: 49
End: 2024-04-23
Payer: COMMERCIAL

## 2024-04-23 DIAGNOSIS — N95.1 PERIMENOPAUSAL SYMPTOM: Primary | ICD-10-CM

## 2024-04-24 RX ORDER — ESTRADIOL 10 UG/1
INSERT VAGINAL
Qty: 24 TABLET | OUTPATIENT
Start: 2024-04-24

## 2024-04-24 NOTE — TELEPHONE ENCOUNTER
I think this should be forwarded to patient's current provider - I haven't seen her since before 2020.    Thanks!    And can you remove me as PCP?

## 2024-04-25 DIAGNOSIS — N95.1 PERIMENOPAUSAL SYMPTOM: ICD-10-CM

## 2024-04-25 RX ORDER — ESTRADIOL 10 UG/1
10 INSERT VAGINAL
Qty: 24 TABLET | Refills: 3 | Status: SHIPPED | OUTPATIENT
Start: 2024-04-25

## 2024-04-25 RX ORDER — ESTRADIOL 10 UG/1
INSERT VAGINAL
Qty: 25 TABLET | OUTPATIENT
Start: 2024-04-25

## 2024-05-13 ENCOUNTER — TELEPHONE (OUTPATIENT)
Dept: FAMILY MEDICINE | Facility: CLINIC | Age: 49
End: 2024-05-13
Payer: COMMERCIAL

## 2024-05-13 NOTE — PATIENT INSTRUCTIONS
If you have labs or imaging done, the results will automatically release in MyVR without an interpretation.  Your health care professional will review those results and send an interpretation with recommendations as soon as possible, but this may be 1-3 business days.    If you have any questions regarding your visit, please contact your care team.     [facility]: [phone]  [facility] CLINIC HOURS TELEPHONE NUMBER   Toma Calles, SHAI Blandon-KELLIE Campos-KELLIE Heath-Surgery Scheduler  Nayla-       Monday- [place]  8:00 am-4:00 pm    Tuesday- [place]  8:00 am-4:00 pm    Wednesday- [place] 8:00 am-4:00 pm    Thursday- [place] 8:00 am-4:00 pm    Friday- [place]  8:00 am-4:00 pm [facility]  [address]  [place] [address]  PH: [phone]  Fax: [phone]    Imaging Scheduling all locations  PH: [phone]     [facility] Delivery  [address] [place] [address] Dr.  [place], [place] [id number] [phone]    [facility]  [address] [place] [address]  PH: [phone]     **Surgeries** Our Surgery Schedulers will contact you to schedule. If you do not receive a call within 3 business days, please call [phone].  Urgent Care locations:  [facility]       Monday-Friday   10 am - 8 pm    Saturday and Sunday   9 am - 5 pm   [phone]   If you need a medication refill, please contact your pharmacy. Please allow 3 business days for your refill to be completed.  As always, Thank you for trusting us with your healthcare needs!

## 2024-05-13 NOTE — TELEPHONE ENCOUNTER
General Call    Contacts         Type Contact Phone/Fax    05/13/2024 11:18 AM CDT Phone (Incoming) Deysi Douglas (Self) [phone] (M)          Reason for Call:  Request for other providers to see    What are your questions or concerns:  Pt has been unable to get in to see Dr. Horn & the last time they spoke gave pt a list of recommended providers to schedule with but pt can no longer locate the names of who she suggested.     Asking for a list of names that Dr. Horn would recommend pt see instead of her.     Date of last appointment with provider: 05/04/2018    Could we send this information to you in Monesbat or would you prefer to receive a phone call?:   Patient would like to be contacted via Monesbat

## 2024-05-14 ENCOUNTER — OFFICE VISIT (OUTPATIENT)
Dept: OBGYN | Facility: CLINIC | Age: 49
End: 2024-05-14
Payer: COMMERCIAL

## 2024-05-14 VITALS
WEIGHT: 186.4 LBS | SYSTOLIC BLOOD PRESSURE: 114 MMHG | HEIGHT: 63 IN | BODY MASS INDEX: 33.03 KG/M2 | HEART RATE: 81 BPM | DIASTOLIC BLOOD PRESSURE: 75 MMHG | OXYGEN SATURATION: 99 %

## 2024-05-14 DIAGNOSIS — N95.1 PERIMENOPAUSE: Primary | ICD-10-CM

## 2024-05-14 DIAGNOSIS — R63.5 WEIGHT GAIN: ICD-10-CM

## 2024-05-14 PROCEDURE — 84450 TRANSFERASE (AST) (SGOT): CPT

## 2024-05-14 PROCEDURE — 36415 COLL VENOUS BLD VENIPUNCTURE: CPT

## 2024-05-14 PROCEDURE — 84460 ALANINE AMINO (ALT) (SGPT): CPT

## 2024-05-14 PROCEDURE — 99215 OFFICE O/P EST HI 40 MIN: CPT

## 2024-05-14 NOTE — PROGRESS NOTES
Subjective:  Deysi is a 49 year old   is here today with the following concerns:    Weight gain: patient reports that she has had a 40lb weight gain, mainly in her mid section, over the past few years. She is currently perimenopausal, with her last bleed occurring in January (but reports some spotting last week). She reports some vaginal dryness and is currently using Vagifem for this. She denies any hot flashes or night sweats. She had her cholesterol recently checked and may need to be starting a statin for this soon. She really does not want to start a statin due to anticipated side effects. She reports she is extremely active and eats very well so she does not feel that there are any more lifestyle measures she can make to improve cholesterol. She is returning to have her cholesterol rechecked in a couple months. She is interested in MHT to help with weight loss. She is worried that the weight gain in her abdomen could cause increased fat on organs and wants liver function tests today.    10-year CVD Risk: 4.1%    ROS: Pertinent ROS as above.    Medical history  OB History    Para Term  AB Living   4 2 2 0 2 2   SAB IAB Ectopic Multiple Live Births   2 0 0 0 2      # Outcome Date GA Lbr Gabriel/2nd Weight Sex Type Anes PTL Lv   4 Term 11/20/10 39w0d  4.111 kg (9 lb 1 oz)  CS-LTranv   ROYAL      Apgar1: 9  Apgar5: 9   3 SAB 09     SAB         Birth Comments: D&C   2 Term 07 38w0d  3.6 kg (7 lb 15 oz) F CS-Unspec   ROYAL      Birth Comments: Breech      Name: Pau   1 SAB 06     SAB   DEC      Birth Comments: D&C      Past Medical History:   Diagnosis Date    ASCUS of cervix with negative high risk HPV 2021-2007 NIL annual paps ,  NIL paps 14 NIL pap, neg HPV 21 ASCUS, neg HPV. Plan: cotest in 3 years    Hypothyroidism     Septate Uterus       Past Surgical History:   Procedure Laterality Date    C/SECTION, LOW TRANSVERSE  2011    repeat    D & C   ",     missed Ab    ESOPHAGOSCOPY, GASTROSCOPY, DUODENOSCOPY (EGD), COMBINED N/A 1/3/2018    Procedure: COMBINED ESOPHAGOSCOPY, GASTROSCOPY, DUODENOSCOPY (EGD), BIOPSY SINGLE OR MULTIPLE;  Upper Endo;  Surgeon: David Kumari MD;  Location:  OR    [facility] [[email or web address]]  DELIVERY ONLY         ALL/Meds: Her medication and allergy histories were reviewed and are documented in their appropriate chart areas.    SH: Reviewed and documented in the appropriate area of the chart.  FH:  Her family history is reviewed and updated in the chart, today.  PMH: Her past medical, surgical, and obstetric histories were reviewed and updated today in the appropriate chart areas.    Objective:  PE: /75 (BP Location: Left arm, Patient Position: Sitting, Cuff Size: Adult Regular)   Pulse 81   Ht 1.603 m (5' 3.1\")   Wt 84.6 kg (186 lb 6.4 oz)   SpO2 99%   BMI 32.91 kg/m    Body mass index is 32.91 kg/m .    Pertinent Physical exam findings:    GENERAL: Active, alert, in no acute distress.  SKIN: Clear. No significant rash, abnormal pigmentation or lesions  MS: no gross musculoskeletal defects noted, no edema  PSYCH: Age-appropriate alertness and orientation  Pelvic: deferred    A/P:  Deysi Douglas is a 49 year old  here today with the following concerns:  (N95.1) Perimenopause  (primary encounter diagnosis)  Comment: Counseled patient on HT for management of VSM. Informed patient that we consider the initiation of menopausal hormone therapy (MHT) to be a safe option for healthy, symptomatic women who are within 10 years of menopause or younger than age 60 years and who do not have contraindications to MHT (such as a history of breast cancer, coronary heart disease, a previous venous thromboembolic event or stroke, or active liver disease. Estrogen-progestin therapy should be used for women with a uterus and unopposed estrogen for those posthysterectomy. For women with vaginal atrophy symptoms only, we " suggest vaginal estrogen but if they have a combination of VSM and  we can proceed with HT. Discussed oral HT vs patch and risks/benefits associated with both.   Plan: I informed her that there is not evidence that MHT would help her lose weight. I informed her that this is not FDA approved use for MHT.  She is understanding of this at this time. I discussed various supplements, such as Inositol and Berberine that can be possibly helpful with weight loss. She has met with a nutritionist and declines referral at this time. She will reach out in the future if she develops any menopause symptoms.    (R63.5) Weight gain  Comment: Recently had BMP so only ordered LFTs. Declines nutritionist referral or weight management referral.  Plan: AST, ALT          She is agreeable to the plan above and has no further questions or concerns. She declined a chaperone for the physical exam component of the visit today.     49 minutes spent on the date of the encounter doing chart review, review of test results, interpretation of tests, patient visit, and documentation and discussion with other providers      GEO Schmidt CNP

## 2024-05-15 LAB
ALT SERPL W P-5'-P-CCNC: 29 U/L (ref 0–50)
AST SERPL W P-5'-P-CCNC: 25 U/L (ref 0–45)

## 2024-09-24 ENCOUNTER — MYC MEDICAL ADVICE (OUTPATIENT)
Dept: OBGYN | Facility: CLINIC | Age: 49
End: 2024-09-24
Payer: COMMERCIAL

## 2024-09-25 NOTE — TELEPHONE ENCOUNTER
"Pt is requesting HRT.    Pt was last seen by GEO Perez CNP, on 5/14/24, for perimenopause concerns/weight gain.  Per OV plan:  \"She will reach out in the future if she develops any menopause symptom\"    Pt is writing in stating she has developed an increase in perimenopause/menopausal symptoms including hot flashes and mood swings.  Pt states her hot flashes bother her several times a day.  Her mood swings come in waves and consist of extreme irritability before her period.  Denies thought to harm self or others.  Pt is also complaining of vaginal dryness.    Pt visited an online doctor visit and described her symptoms.  It was recommended that she use estradiol gel 1 mg (apply anywhere on the skin) and progesterone 100 mg.    Pt feels more comfortable with Toma's advise than talking with a remote doctor.  Pt is wondering if Toma thinks it is time for hormone therapy now and if she would prescribe it for her.          "

## 2024-09-26 NOTE — TELEPHONE ENCOUNTER
I would prefer to do a visit with this patient to further discuss options with her. Can be phone/video if she would prefer.    Thanks!  GEO Schmidt CNP

## 2024-11-19 ENCOUNTER — HOSPITAL ENCOUNTER (OUTPATIENT)
Dept: MAMMOGRAPHY | Facility: CLINIC | Age: 49
Discharge: HOME | End: 2024-11-19
Attending: FAMILY MEDICINE
Payer: COMMERCIAL

## 2024-11-19 DIAGNOSIS — Z12.31 VISIT FOR SCREENING MAMMOGRAM: ICD-10-CM

## 2024-11-19 PROCEDURE — 77063 BREAST TOMOSYNTHESIS BI: CPT

## 2024-11-19 PROCEDURE — 77067 SCR MAMMO BI INCL CAD: CPT

## 2025-01-10 PROBLEM — N95.1 PERIMENOPAUSE: Status: ACTIVE | Noted: 2024-09-30

## 2025-01-13 ENCOUNTER — MYC MEDICAL ADVICE (OUTPATIENT)
Dept: FAMILY MEDICINE | Facility: CLINIC | Age: 50
End: 2025-01-13
Payer: COMMERCIAL

## 2025-01-13 ENCOUNTER — PATIENT OUTREACH (OUTPATIENT)
Dept: CARE COORDINATION | Facility: CLINIC | Age: 50
End: 2025-01-13
Payer: COMMERCIAL

## 2025-01-13 DIAGNOSIS — E78.2 MIXED HYPERLIPIDEMIA: Primary | ICD-10-CM

## 2025-01-13 RX ORDER — ROSUVASTATIN CALCIUM 20 MG/1
20 TABLET, COATED ORAL DAILY
Qty: 90 TABLET | Refills: 4 | Status: SHIPPED | OUTPATIENT
Start: 2025-01-13

## 2025-01-15 ENCOUNTER — HOSPITAL ENCOUNTER (OUTPATIENT)
Dept: BONE DENSITY | Facility: CLINIC | Age: 50
Discharge: HOME | End: 2025-01-15
Attending: INTERNAL MEDICINE
Payer: COMMERCIAL

## 2025-01-15 DIAGNOSIS — Z78.0 ENCOUNTER FOR OSTEOPOROSIS SCREENING IN ASYMPTOMATIC POSTMENOPAUSAL PATIENT: ICD-10-CM

## 2025-01-15 DIAGNOSIS — Z13.820 ENCOUNTER FOR OSTEOPOROSIS SCREENING IN ASYMPTOMATIC POSTMENOPAUSAL PATIENT: ICD-10-CM

## 2025-01-15 PROCEDURE — 77080 DXA BONE DENSITY AXIAL: CPT

## 2025-01-22 ENCOUNTER — MYC MEDICAL ADVICE (OUTPATIENT)
Dept: FAMILY MEDICINE | Facility: CLINIC | Age: 50
End: 2025-01-22
Payer: COMMERCIAL

## 2025-01-22 NOTE — TELEPHONE ENCOUNTER
Dr. Srivastava: would you prescribe an alternative?  Or stop until cardiology appt?    I ve been having numbness and tingling in my hands and feet since starting this statin. Last night I woke up in the night with both arms numb. I think I should stop this medication and try something else.      I see the cardiologist on 2/19/25. Should I wait until then or would you like to prescribe something else now?    BILL AgarwalN, PHN, RN-[facility]  [phone]

## 2025-01-23 NOTE — TELEPHONE ENCOUNTER
I recommend stopping it completely to see if there is resolution of symptoms and then talking about pravastatin as an alternative with cardiology.    Thank you!     Yohana Srivastava, DO  Internal Medicine - Pediatrics Physician  [facility]

## 2025-01-29 ENCOUNTER — HOSPITAL ENCOUNTER (OUTPATIENT)
Dept: CT IMAGING | Facility: CLINIC | Age: 50
Discharge: HOME | End: 2025-01-29
Attending: INTERNAL MEDICINE
Payer: COMMERCIAL

## 2025-01-29 DIAGNOSIS — E78.2 MIXED HYPERLIPIDEMIA: ICD-10-CM

## 2025-01-29 PROCEDURE — 75571 CT HRT W/O DYE W/CA TEST: CPT

## 2025-01-29 PROCEDURE — 75571 CT HRT W/O DYE W/CA TEST: CPT | Mod: 26 | Performed by: INTERNAL MEDICINE

## 2025-01-31 ENCOUNTER — MYC MEDICAL ADVICE (OUTPATIENT)
Dept: FAMILY MEDICINE | Facility: CLINIC | Age: 50
End: 2025-01-31
Payer: COMMERCIAL

## 2025-02-04 NOTE — TELEPHONE ENCOUNTER
Responded to the patient through MyChart.     Sylvia Burgos RN  [facility]

## 2025-02-11 ENCOUNTER — PATIENT OUTREACH (OUTPATIENT)
Dept: CARE COORDINATION | Facility: CLINIC | Age: 50
End: 2025-02-11
Payer: COMMERCIAL

## 2025-02-26 DIAGNOSIS — G47.33 OSA (OBSTRUCTIVE SLEEP APNEA): Primary | ICD-10-CM

## 2025-04-07 ENCOUNTER — TELEPHONE (OUTPATIENT)
Dept: FAMILY MEDICINE | Facility: CLINIC | Age: 50
End: 2025-04-07

## 2025-04-07 ENCOUNTER — OFFICE VISIT (OUTPATIENT)
Dept: FAMILY MEDICINE | Facility: CLINIC | Age: 50
End: 2025-04-07
Payer: COMMERCIAL

## 2025-04-07 VITALS
DIASTOLIC BLOOD PRESSURE: 70 MMHG | OXYGEN SATURATION: 96 % | RESPIRATION RATE: 20 BRPM | HEART RATE: 65 BPM | WEIGHT: 197 LBS | TEMPERATURE: 97.4 F | SYSTOLIC BLOOD PRESSURE: 110 MMHG | HEIGHT: 63 IN | BODY MASS INDEX: 34.91 KG/M2

## 2025-04-07 DIAGNOSIS — F33.42 RECURRENT MAJOR DEPRESSIVE DISORDER, IN FULL REMISSION: ICD-10-CM

## 2025-04-07 DIAGNOSIS — E78.2 MIXED HYPERLIPIDEMIA: ICD-10-CM

## 2025-04-07 DIAGNOSIS — E06.3 HYPOTHYROIDISM DUE TO HASHIMOTO'S THYROIDITIS: ICD-10-CM

## 2025-04-07 DIAGNOSIS — E66.811 CLASS 1 OBESITY DUE TO EXCESS CALORIES WITHOUT SERIOUS COMORBIDITY WITH BODY MASS INDEX (BMI) OF 34.0 TO 34.9 IN ADULT: ICD-10-CM

## 2025-04-07 DIAGNOSIS — Z00.00 ROUTINE GENERAL MEDICAL EXAMINATION AT A HEALTH CARE FACILITY: ICD-10-CM

## 2025-04-07 DIAGNOSIS — E66.09 CLASS 1 OBESITY DUE TO EXCESS CALORIES WITHOUT SERIOUS COMORBIDITY WITH BODY MASS INDEX (BMI) OF 34.0 TO 34.9 IN ADULT: ICD-10-CM

## 2025-04-07 DIAGNOSIS — B00.9 HERPES SIMPLEX VIRUS INFECTION: ICD-10-CM

## 2025-04-07 PROCEDURE — 99214 OFFICE O/P EST MOD 30 MIN: CPT | Mod: 25 | Performed by: INTERNAL MEDICINE

## 2025-04-07 PROCEDURE — 99396 PREV VISIT EST AGE 40-64: CPT | Mod: 25 | Performed by: INTERNAL MEDICINE

## 2025-04-07 PROCEDURE — 90677 PCV20 VACCINE IM: CPT | Performed by: INTERNAL MEDICINE

## 2025-04-07 PROCEDURE — 3078F DIAST BP <80 MM HG: CPT | Performed by: INTERNAL MEDICINE

## 2025-04-07 PROCEDURE — 3074F SYST BP LT 130 MM HG: CPT | Performed by: INTERNAL MEDICINE

## 2025-04-07 PROCEDURE — 1126F AMNT PAIN NOTED NONE PRSNT: CPT | Performed by: INTERNAL MEDICINE

## 2025-04-07 PROCEDURE — 90471 IMMUNIZATION ADMIN: CPT | Performed by: INTERNAL MEDICINE

## 2025-04-07 PROCEDURE — 96127 BRIEF EMOTIONAL/BEHAV ASSMT: CPT | Performed by: INTERNAL MEDICINE

## 2025-04-07 RX ORDER — VALACYCLOVIR HYDROCHLORIDE 500 MG/1
500 TABLET, FILM COATED ORAL 2 TIMES DAILY
Qty: 18 TABLET | Refills: 5 | Status: SHIPPED | OUTPATIENT
Start: 2025-04-07

## 2025-04-07 RX ORDER — ESCITALOPRAM OXALATE 10 MG/1
15 TABLET ORAL DAILY
Qty: 90 TABLET | Refills: 11 | Status: SHIPPED | OUTPATIENT
Start: 2025-04-07

## 2025-04-07 SDOH — HEALTH STABILITY: PHYSICAL HEALTH: ON AVERAGE, HOW MANY MINUTES DO YOU ENGAGE IN EXERCISE AT THIS LEVEL?: 60 MIN

## 2025-04-07 SDOH — HEALTH STABILITY: PHYSICAL HEALTH: ON AVERAGE, HOW MANY DAYS PER WEEK DO YOU ENGAGE IN MODERATE TO STRENUOUS EXERCISE (LIKE A BRISK WALK)?: 5 DAYS

## 2025-04-07 ASSESSMENT — ANXIETY QUESTIONNAIRES
7. FEELING AFRAID AS IF SOMETHING AWFUL MIGHT HAPPEN: NOT AT ALL
6. BECOMING EASILY ANNOYED OR IRRITABLE: NOT AT ALL
2. NOT BEING ABLE TO STOP OR CONTROL WORRYING: NOT AT ALL
1. FEELING NERVOUS, ANXIOUS, OR ON EDGE: SEVERAL DAYS
7. FEELING AFRAID AS IF SOMETHING AWFUL MIGHT HAPPEN: NOT AT ALL
4. TROUBLE RELAXING: NOT AT ALL
GAD7 TOTAL SCORE: 1
IF YOU CHECKED OFF ANY PROBLEMS ON THIS QUESTIONNAIRE, HOW DIFFICULT HAVE THESE PROBLEMS MADE IT FOR YOU TO DO YOUR WORK, TAKE CARE OF THINGS AT HOME, OR GET ALONG WITH OTHER PEOPLE: SOMEWHAT DIFFICULT
5. BEING SO RESTLESS THAT IT IS HARD TO SIT STILL: NOT AT ALL
3. WORRYING TOO MUCH ABOUT DIFFERENT THINGS: NOT AT ALL
8. IF YOU CHECKED OFF ANY PROBLEMS, HOW DIFFICULT HAVE THESE MADE IT FOR YOU TO DO YOUR WORK, TAKE CARE OF THINGS AT HOME, OR GET ALONG WITH OTHER PEOPLE?: SOMEWHAT DIFFICULT

## 2025-04-07 ASSESSMENT — PAIN SCALES - GENERAL: PAINLEVEL_OUTOF10: NO PAIN (0)

## 2025-04-07 ASSESSMENT — SOCIAL DETERMINANTS OF HEALTH (SDOH): HOW OFTEN DO YOU GET TOGETHER WITH FRIENDS OR RELATIVES?: MORE THAN THREE TIMES A WEEK

## 2025-04-07 NOTE — PATIENT INSTRUCTIONS
- Start Zepbound (tirzepatide)- biggest side effects are stomach fullness, discomfort, and nausea- we will reduce the risk of these side effects by cutting portions in half at each meal (and then eat a little more if still hungry) and by slowly increasing the dose.  The other big side effect is constipation, keep an eye on this and try Miralax or senna if needed to have a soft, easy bowel movement each day.      Patient Education   Preventive Care Advice   This is general advice given by our system to help you stay healthy. However, your care team may have specific advice just for you. Please talk to your care team about your preventive care needs.  Nutrition  Eat 5 or more servings of fruits and vegetables each day.  Try wheat bread, brown rice and whole grain pasta (instead of white bread, rice, and pasta).  Get enough calcium and vitamin D. Check the label on foods and aim for 100% of the RDA (recommended daily allowance).  Lifestyle  Exercise at least 150 minutes each week  (30 minutes a day, 5 days a week).  Do muscle strengthening activities 2 days a week. These help control your weight and prevent disease.  No smoking.  Wear sunscreen to prevent skin cancer.  Have a dental exam and cleaning every 6 months.  Yearly exams  See your health care team every year to talk about:  Any changes in your health.  Any medicines your care team has prescribed.  Preventive care, family planning, and ways to prevent chronic diseases.  Shots (vaccines)   HPV shots (up to age 26), if you've never had them before.  Hepatitis B shots (up to age 59), if you've never had them before.  COVID-19 shot: Get this shot when it's due.  Flu shot: Get a flu shot every year.  Tetanus shot: Get a tetanus shot every 10 years.  Pneumococcal, hepatitis A, and RSV shots: Ask your care team if you need these based on your risk.  Shingles shot (for age 50 and up)  General health tests  Diabetes screening:  Starting at age 35, Get screened for  diabetes at least every 3 years.  If you are younger than age 35, ask your care team if you should be screened for diabetes.  Cholesterol test: At age 39, start having a cholesterol test every 5 years, or more often if advised.  Bone density scan (DEXA): At age 50, ask your care team if you should have this scan for osteoporosis (brittle bones).  Hepatitis C: Get tested at least once in your life.  STIs (sexually transmitted infections)  Before age 24: Ask your care team if you should be screened for STIs.  After age 24: Get screened for STIs if you're at risk. You are at risk for STIs (including HIV) if:  You are sexually active with more than one person.  You don't use condoms every time.  You or a partner was diagnosed with a sexually transmitted infection.  If you are at risk for HIV, ask about PrEP medicine to prevent HIV.  Get tested for HIV at least once in your life, whether you are at risk for HIV or not.  Cancer screening tests  Cervical cancer screening: If you have a cervix, begin getting regular cervical cancer screening tests starting at age 21.  Breast cancer scan (mammogram): If you've ever had breasts, begin having regular mammograms starting at age 40. This is a scan to check for breast cancer.  Colon cancer screening: It is important to start screening for colon cancer at age 45.  Have a colonoscopy test every 10 years (or more often if you're at risk) Or, ask your provider about stool tests like a FIT test every year or Cologuard test every 3 years.  To learn more about your testing options, visit:   .  For help making a decision, visit:   https://bit.ly/sq28016.  Prostate cancer screening test: If you have a prostate, ask your care team if a prostate cancer screening test (PSA) at age 55 is right for you.  Lung cancer screening: If you are a current or former smoker ages 50 to 80, ask your care team if ongoing lung cancer screenings are right for you.  For informational purposes only. Not to  replace the advice of your health care provider. Copyright   2023 [facility]. All rights reserved. Clinically reviewed by the [facility] Transitions Program. MedTel24 [id number] - REV 01/24.

## 2025-04-07 NOTE — NURSING NOTE
Prior to immunization administration, verified patients identity using patient s name and date of birth. Please see Immunization Activity for additional information.     Screening Questionnaire for Adult Immunization    Are you sick today?   No   Do you have allergies to medications, food, a vaccine component or latex?   No   Have you ever had a serious reaction after receiving a vaccination?   No   Do you have a long-term health problem with heart, lung, kidney, or metabolic disease (e.g., diabetes), asthma, a blood disorder, no spleen, complement component deficiency, a cochlear implant, or a spinal fluid leak?  Are you on long-term aspirin therapy?   No   Do you have cancer, leukemia, HIV/AIDS, or any other immune system problem?   No   Do you have a parent, brother, or sister with an immune system problem?   No   In the past 3 months, have you taken medications that affect  your immune system, such as prednisone, other steroids, or anticancer drugs; drugs for the treatment of rheumatoid arthritis, Crohn s disease, or psoriasis; or have you had radiation treatments?   No   Have you had a seizure, or a brain or other nervous system problem?   No   During the past year, have you received a transfusion of blood or blood    products, or been given immune (gamma) globulin or antiviral drug?   No   For women: Are you pregnant or is there a chance you could become       pregnant during the next month?   No   Have you received any vaccinations in the past 4 weeks?   No     Immunization questionnaire answers were all negative.      Patient instructed to remain in clinic for 15 minutes afterwards, and to report any adverse reactions.     Screening performed by Niecy Mackay MA on 4/7/2025 at 10:51 AM.

## 2025-04-07 NOTE — PROGRESS NOTES
Preventive Care Visit  [facility]  Yohana Srivastava DO, Internal Medicine  Apr 7, 2025      Assessment & Plan     (Z00.00) Routine general medical examination at a health care facility  Comment:   Mammo: 11/19/24  Pap: 3/5/24, repeat in 3 years  Colon (45-75): 11/29/20- repeat in 10 years- [facility]  DEXA: 1/15/25- normal  Immunizations: PCV, HBV  Labs: Lipids; CT calcium screen= 0 (1/29/25); stopped rosuvastatin due to tingling and then pravastatin v cardiology; Cards 2/19?  Discussed healthy lifestyle and aging recommendations including regular exercise, adequate and regular sleep, 5+ fruits and veggies daily.    (F33.42) Recurrent major depressive disorder, in full remission  Comment: Lexapro 15 mg daily working well- continue for now, I will take over prescribing.  Plan: escitalopram (LEXAPRO) 10 MG tablet    (E66.811,  E66.09,  Z68.34) Class 1 obesity due to excess calories without serious comorbidity with body mass index (BMI) of 34.0 to 34.9 in adult  Comment: Complicated by DAVE, severe hyperlipidemia with fam hx of premature heart disease- recommend starting GLP1 inhibitor.  Plan: tirzepatide-Weight Management (ZEPBOUND) 7.5         MG/0.5ML prefilled pen, tirzepatide-Weight         Management (ZEPBOUND) 5 MG/0.5ML prefilled pen,        tirzepatide-Weight Management (ZEPBOUND) 2.5         MG/0.5ML prefilled pen    (B00.9) Herpes simplex virus infection  Comment: Valtrex prn for outbreaks.  Plan: valACYclovir (VALTREX) 500 MG tablet    (E06.3) Hypothyroidism due to Hashimoto's thyroiditis  Comment: Last check normal- check after starting tirzepatide and monitor for symptoms.  Cont current levothyroxine.  Plan: TSH with free T4 reflex, Lipid panel reflex to         direct LDL Fasting    (E78.2) Mixed hyperlipidemia  Comment: LDL>190- concerning for familial hyperlipidemia.  Her sister started tirzepatide and LDL decr a large amt- CT calcium= zero; ok to monitor on  "tirzepatide.  Plan: TSH with free T4 reflex, Lipid panel reflex to         direct LDL Fasting       Patient has been advised of split billing requirements and indicates understanding: Yes        BMI  Estimated body mass index is 34.52 kg/m  as calculated from the following:    Height as of this encounter: 1.609 m (5' 3.35\").    Weight as of this encounter: 89.4 kg (197 lb).   Weight management plan: Discussed healthy diet and exercise guidelines    Counseling  Appropriate preventive services were addressed with this patient via screening, questionnaire, or discussion as appropriate for fall prevention, nutrition, physical activity, Tobacco-use cessation, social engagement, weight loss and cognition.  Checklist reviewing preventive services available has been given to the patient.  Reviewed patient's diet, addressing concerns and/or questions.       Patient Instructions   - Start Zepbound (tirzepatide)- biggest side effects are stomach fullness, discomfort, and nausea- we will reduce the risk of these side effects by cutting portions in half at each meal (and then eat a little more if still hungry) and by slowly increasing the dose.  The other big side effect is constipation, keep an eye on this and try Miralax or senna if needed to have a soft, easy bowel movement each day.      Patient Education  Preventive Care Advice   This is general advice given by our system to help you stay healthy. However, your care team may have specific advice just for you. Please talk to your care team about your preventive care needs.  Nutrition  Eat 5 or more servings of fruits and vegetables each day.  Try wheat bread, brown rice and whole grain pasta (instead of white bread, rice, and pasta).  Get enough calcium and vitamin D. Check the label on foods and aim for 100% of the RDA (recommended daily allowance).  Lifestyle  Exercise at least 150 minutes each week  (30 minutes a day, 5 days a week).  Do muscle strengthening activities 2 " days a week. These help control your weight and prevent disease.  No smoking.  Wear sunscreen to prevent skin cancer.  Have a dental exam and cleaning every 6 months.  Yearly exams  See your health care team every year to talk about:  Any changes in your health.  Any medicines your care team has prescribed.  Preventive care, family planning, and ways to prevent chronic diseases.  Shots (vaccines)   HPV shots (up to age 26), if you've never had them before.  Hepatitis B shots (up to age 59), if you've never had them before.  COVID-19 shot: Get this shot when it's due.  Flu shot: Get a flu shot every year.  Tetanus shot: Get a tetanus shot every 10 years.  Pneumococcal, hepatitis A, and RSV shots: Ask your care team if you need these based on your risk.  Shingles shot (for age 50 and up)  General health tests  Diabetes screening:  Starting at age 35, Get screened for diabetes at least every 3 years.  If you are younger than age 35, ask your care team if you should be screened for diabetes.  Cholesterol test: At age 39, start having a cholesterol test every 5 years, or more often if advised.  Bone density scan (DEXA): At age 50, ask your care team if you should have this scan for osteoporosis (brittle bones).  Hepatitis C: Get tested at least once in your life.  STIs (sexually transmitted infections)  Before age 24: Ask your care team if you should be screened for STIs.  After age 24: Get screened for STIs if you're at risk. You are at risk for STIs (including HIV) if:  You are sexually active with more than one person.  You don't use condoms every time.  You or a partner was diagnosed with a sexually transmitted infection.  If you are at risk for HIV, ask about PrEP medicine to prevent HIV.  Get tested for HIV at least once in your life, whether you are at risk for HIV or not.  Cancer screening tests  Cervical cancer screening: If you have a cervix, begin getting regular cervical cancer screening tests starting at age  21.  Breast cancer scan (mammogram): If you've ever had breasts, begin having regular mammograms starting at age 40. This is a scan to check for breast cancer.  Colon cancer screening: It is important to start screening for colon cancer at age 45.  Have a colonoscopy test every 10 years (or more often if you're at risk) Or, ask your provider about stool tests like a FIT test every year or Cologuard test every 3 years.  To learn more about your testing options, visit:   .  For help making a decision, visit:   https://bit.ly/hi00254.  Prostate cancer screening test: If you have a prostate, ask your care team if a prostate cancer screening test (PSA) at age 55 is right for you.  Lung cancer screening: If you are a current or former smoker ages 50 to 80, ask your care team if ongoing lung cancer screenings are right for you.  For informational purposes only. Not to replace the advice of your health care provider. Copyright   2023 [facility]. All rights reserved. Clinically reviewed by the [facility]. [employer] 162734 - REV 01/24.         Nahomi Jo is a 50 year old, presenting for the following:  Physical        4/7/2025     9:56 AM   Additional Questions   Roomed by Kacie   Accompanied by alone         4/7/2025     9:56 AM   Patient Reported Additional Medications   Patient reports taking the following new medications none          HPI    Very active.  Eats well.  Weight keeps going up.  Cholesterol is very high.  Wears CPAP for sleep apnea.  Worries about weight affecting ability to be active, sleep apnea.  Would be interested in tirzepatide.    Cholesterol high- sister's cholesterol dropped a ton when started tirzepatide.  Interested in monitoring since had side effects with rosuvastatin.    Is nearing menopause (in June will be 12 months without period). Stopped patch and progesterone.    Dr. Slaughter (private practice) prescribes Lexapro.         Advance Care  Planning  Patient does not have a Health Care Directive: Discussed advance care planning with patient; information given to patient to review.      4/7/2025   General Health   How would you rate your overall physical health? Good   Feel stress (tense, anxious, or unable to sleep) Only a little   (!) STRESS CONCERN      4/7/2025   Nutrition   Three or more servings of calcium each day? Yes   Diet: Gluten-free/reduced   How many servings of fruit and vegetables per day? 4 or more   How many sweetened beverages each day? 0-1         4/7/2025   Exercise   Days per week of moderate/strenous exercise 5 days   Average minutes spent exercising at this level 60 min         4/7/2025   Social Factors   Frequency of gathering with friends or relatives More than three times a week   Worry food won't last until get money to buy more No   Food not last or not have enough money for food? No   Do you have housing? (Housing is defined as stable permanent housing and does not include staying ouside in a car, in a tent, in an abandoned building, in an overnight shelter, or couch-surfing.) Yes   Are you worried about losing your housing? No   Lack of transportation? No   Unable to get utilities (heat,electricity)? No         4/7/2025   Fall Risk   Fallen 2 or more times in the past year? No   Trouble with walking or balance? No          4/7/2025   Dental   Dentist two times every year? Yes           3/5/2024   TB Screening   Were you born outside of the [place]? No           Today's PHQ-9 Score:       1/10/2025     8:24 AM   PHQ-9 SCORE   PHQ-9 Total Score MyChart 3 (Minimal depression)   PHQ-9 Total Score 3        Patient-reported           4/7/2025   Substance Use   Alcohol more than 3/day or more than 7/wk Not Applicable   Do you use any other substances recreationally? No     Social History     Tobacco Use    Smoking status: Never     Passive exposure: Never    Smokeless tobacco: Never   Vaping Use    Vaping status: Never Used    Substance Use Topics    Alcohol use: No     Alcohol/week: 0.0 standard drinks of alcohol    Drug use: No           11/19/2024   LAST FHS-7 RESULTS   1st degree relative breast or ovarian cancer No   Any relative bilateral breast cancer No   Any male have breast cancer No   Any ONE woman have BOTH breast AND ovarian cancer No   Any woman with breast cancer before 50yrs No   2 or more relatives with breast AND/OR ovarian cancer No   2 or more relatives with breast AND/OR bowel cancer No        Mammogram Screening - Mammogram every 1-2 years updated in Health Maintenance based on mutual decision making        4/7/2025   STI Screening   New sexual partner(s) since last STI/HIV test? No     History of abnormal Pap smear: No - age 30-64 HPV with reflex Pap every 5 years recommended        Latest Ref Rng & Units 3/5/2024     9:15 AM 5/25/2021     9:58 AM 5/25/2021     9:40 AM   PAP / HPV   PAP  Negative for Intraepithelial Lesion or Malignancy (NILM)      PAP (Historical)   ASC-US     HPV 16 DNA Negative Negative   Negative    HPV 18 DNA Negative Negative   Negative    Other HR HPV Negative Negative   Negative      ASCVD Risk   The 10-year ASCVD risk score (Kimberly TRINIDAD, et al., 2019) is: 1.7%    Values used to calculate the score:      Age: 50 years      Sex: Female      Is Non- : No      Diabetic: No      Tobacco smoker: No      Systolic Blood Pressure: 110 mmHg      Is BP treated: No      HDL Cholesterol: 54 mg/dL      Total Cholesterol: 278 mg/dL           Reviewed and updated as needed this visit by Provider     Meds  Problems  Med Hx  Surg Hx  Fam Hx            Past Medical History:   Diagnosis Date    ASCUS of cervix with negative high risk HPV 5/25/2021 2001-2007 NIL annual paps 2010, 2012 NIL paps 2/11/14 NIL pap, neg HPV 5/25/21 ASCUS, neg HPV. Plan: cotest in 3 years    Hypothyroidism     Septate Uterus      Past Surgical History:   Procedure Laterality Date    C/SECTION,  "LOW TRANSVERSE  2011    repeat    D & C  ,     missed Ab    ESOPHAGOSCOPY, GASTROSCOPY, DUODENOSCOPY (EGD), COMBINED N/A 1/3/2018    Procedure: COMBINED ESOPHAGOSCOPY, GASTROSCOPY, DUODENOSCOPY (EGD), BIOPSY SINGLE OR MULTIPLE;  Upper Endo;  Surgeon: David Kumari MD;  Location:  OR    [facility]  DELIVERY ONLY       Lab work is in process         Objective    Exam  /70 (BP Location: Right arm, Patient Position: Sitting, Cuff Size: Adult Large)   Pulse 65   Temp 97.4  F (36.3  C) (Temporal)   Resp 20   Ht 1.609 m (5' 3.35\")   Wt 89.4 kg (197 lb)   SpO2 96%   BMI 34.52 kg/m     Estimated body mass index is 34.52 kg/m  as calculated from the following:    Height as of this encounter: 1.609 m (5' 3.35\").    Weight as of this encounter: 89.4 kg (197 lb).    Physical Exam  GENERAL: alert and no distress  EYES: Eyes grossly normal to inspection, PERRL and conjunctivae and sclerae normal  HENT: ear canals and TM's normal, nose and mouth without ulcers or lesions  NECK: no adenopathy, no asymmetry, masses, or scars  RESP: lungs clear to auscultation - no rales, rhonchi or wheezes  BREAST: normal without masses, tenderness or nipple discharge and no palpable axillary masses or adenopathy  CV: regular rate and rhythm, normal S1 S2, no S3 or S4, no murmur, click or rub, no peripheral edema  MS: no gross musculoskeletal defects noted, no edema  SKIN: no suspicious lesions or rashes  NEURO: Normal strength and tone, mentation intact and speech normal  PSYCH: mentation appears normal, affect normal/bright        Signed Electronically by: Yohana Srivastava DO    Answers submitted by the patient for this visit:  Patient Health Questionnaire (G7) (Submitted on 2025)  MARCELO 7 TOTAL SCORE: 1    "

## 2025-04-07 NOTE — TELEPHONE ENCOUNTER
Retail Pharmacy Prior Authorization Team   Phone: [phone]      [facility] KEY: (Key: [id number])    PA Initiation    Medication: ESCITALOPRAM OXALATE 10 MG PO TABS  Insurance Company: [employer] [place] - Phone [phone] Fax [phone]  Pharmacy Filling the Rx: [facility] - [place], [place] - [address]  Filling Pharmacy Phone: [phone]  Filling Pharmacy Fax: [phone]  Start Date: 4/7/2025

## 2025-04-08 ENCOUNTER — TELEPHONE (OUTPATIENT)
Dept: FAMILY MEDICINE | Facility: CLINIC | Age: 50
End: 2025-04-08
Payer: COMMERCIAL

## 2025-04-08 DIAGNOSIS — E66.811 CLASS 1 OBESITY DUE TO EXCESS CALORIES WITHOUT SERIOUS COMORBIDITY WITH BODY MASS INDEX (BMI) OF 34.0 TO 34.9 IN ADULT: ICD-10-CM

## 2025-04-08 DIAGNOSIS — E66.09 CLASS 1 OBESITY DUE TO EXCESS CALORIES WITHOUT SERIOUS COMORBIDITY WITH BODY MASS INDEX (BMI) OF 34.0 TO 34.9 IN ADULT: ICD-10-CM

## 2025-04-08 DIAGNOSIS — G47.33 OBSTRUCTIVE SLEEP APNEA SYNDROME: Primary | ICD-10-CM

## 2025-04-08 NOTE — TELEPHONE ENCOUNTER
Retail Pharmacy Prior Authorization Team   Phone: [phone]      Prior Authorization Approval    Medication: ESCITALOPRAM OXALATE 10 MG PO TABS  Authorization Effective Date: 3/8/2025  Authorization Expiration Date: 4/7/2026  Approved Dose/Quantity: UP TO 1.5 TABLETS PER DAY  Reference #: (Key: [id number])   Insurance Company: [facility] - Phone [phone] Fax [phone]  Expected CoPay: $    CoPay Card Available: No    Financial Assistance Needed:   Which Pharmacy is filling the prescription: [facility] - [place], [place] - [address] [place] [address]  Pharmacy Notified: YES  Patient Notified: **Instructed pharmacy to notify patient when script is ready to /ship.**

## 2025-04-10 ENCOUNTER — MYC MEDICAL ADVICE (OUTPATIENT)
Dept: FAMILY MEDICINE | Facility: CLINIC | Age: 50
End: 2025-04-10
Payer: COMMERCIAL

## 2025-04-10 NOTE — TELEPHONE ENCOUNTER
Retail Pharmacy Prior Authorization Team   Phone: [phone]    PA Initiation    Medication: ZEPBOUND 2.5 MG/0.5ML SC SOAJ  Insurance Company: [employer] - Phone [phone] Fax [phone]  Pharmacy Filling the Rx: [facility] - [place], [place] - [address] [facility]  Filling Pharmacy Phone: [phone]  Filling Pharmacy Fax:    Start Date: 4/10/2025

## 2025-04-10 NOTE — TELEPHONE ENCOUNTER
PRIOR AUTHORIZATION DENIED    Medication: ZEPBOUND 2.5 MG/0.5ML SC SOAJ  Insurance Company: [employer] - Phone [phone] Fax [phone]  Denial Date: 4/10/2025  Denial Rational:         Appeal Information: Drug exclusions can not be appealed. This medication will not be covered by the prescription plan for any reason. The drug is not on formulary and there are no loopholes to gaining approval.     Patient Notified: No

## 2025-04-10 NOTE — TELEPHONE ENCOUNTER
Please let patient know will not be covered by insurance because it's a drug exclusion- but I could do other strategies to make it more affordable out of pocket (like through [employer] direct) or could do [employer] [facility] with sublingual semaglutide or can offer to see Rae our pharmacist who may have additional strategies.    Yohana Srivastava, DO  Internal Medicine - Pediatrics Physician  [facility]

## 2025-04-10 NOTE — TELEPHONE ENCOUNTER
Dr. Srivastava - patient would like to have rx prescribed to [facility].    GOSIA Laird, BSN, PHN, AMB-BC (she/her)  [facility] RN

## 2025-04-10 NOTE — TELEPHONE ENCOUNTER
Writer called and left message on patient's voicemail to call back and speak with a triage nurse.    Writer responded via [employer].    Stacie Rodriguez, BILLN RN  [facility]

## 2025-05-01 DIAGNOSIS — E66.09 CLASS 1 OBESITY DUE TO EXCESS CALORIES WITHOUT SERIOUS COMORBIDITY WITH BODY MASS INDEX (BMI) OF 34.0 TO 34.9 IN ADULT: ICD-10-CM

## 2025-05-01 DIAGNOSIS — E66.811 CLASS 1 OBESITY DUE TO EXCESS CALORIES WITHOUT SERIOUS COMORBIDITY WITH BODY MASS INDEX (BMI) OF 34.0 TO 34.9 IN ADULT: ICD-10-CM

## 2025-05-01 RX ORDER — TIRZEPATIDE 2.5 MG/.5ML
INJECTION, SOLUTION SUBCUTANEOUS
Qty: 2 ML | Refills: 0 | OUTPATIENT
Start: 2025-05-01

## 2025-06-04 DIAGNOSIS — E66.811 CLASS 1 OBESITY DUE TO EXCESS CALORIES WITHOUT SERIOUS COMORBIDITY WITH BODY MASS INDEX (BMI) OF 34.0 TO 34.9 IN ADULT: ICD-10-CM

## 2025-06-04 DIAGNOSIS — E66.09 CLASS 1 OBESITY DUE TO EXCESS CALORIES WITHOUT SERIOUS COMORBIDITY WITH BODY MASS INDEX (BMI) OF 34.0 TO 34.9 IN ADULT: ICD-10-CM

## 2025-06-04 DIAGNOSIS — G47.33 OBSTRUCTIVE SLEEP APNEA SYNDROME: ICD-10-CM

## 2025-06-04 RX ORDER — TIRZEPATIDE 5 MG/.5ML
INJECTION, SOLUTION SUBCUTANEOUS
Qty: 2 ML | Refills: 0 | Status: SHIPPED | OUTPATIENT
Start: 2025-06-04

## 2025-06-06 ENCOUNTER — TELEPHONE (OUTPATIENT)
Dept: FAMILY MEDICINE | Facility: CLINIC | Age: 50
End: 2025-06-06
Payer: COMMERCIAL

## 2025-06-10 NOTE — TELEPHONE ENCOUNTER
I believe patient is already aware of this and is doing Rx through [facility]- please let her know of official PA denial.    Yohana Srivastava, DO  Internal Medicine - Pediatrics Physician  [facility]

## 2025-06-10 NOTE — TELEPHONE ENCOUNTER
PRIOR AUTHORIZATION DENIED    Medication: TIRZEPATIDE 5 MG/0.5ML SC SOAJ  Insurance Company: [employer] [place] - Phone [phone] Fax [phone]  Denial Date: 6/6/2025  Denial Reason(s):     Appeal Information:     Patient Notified: No

## 2025-06-10 NOTE — TELEPHONE ENCOUNTER
Dr. Srivastava-Please review and advise.    Thank you!  BILL PinedoN, RN-BC  [facility]

## 2025-06-11 NOTE — TELEPHONE ENCOUNTER
Relayed plan of care to patient via [employer].    BILL SanfordN, RN (she/her)  [facility] RN

## 2025-08-25 ENCOUNTER — TRANSFERRED RECORDS (OUTPATIENT)
Dept: HEALTH INFORMATION MANAGEMENT | Facility: CLINIC | Age: 50
End: 2025-08-25
Payer: COMMERCIAL

## (undated) DEVICE — DRAPE SHEET MED 44X70" 9355

## (undated) DEVICE — SUCTION MANIFOLD NEPTUNE 2 SYS 4 PORT 0702-020-000

## (undated) DEVICE — SPECIMEN CONTAINER 3OZ W/FORMALIN 59901

## (undated) DEVICE — ENDO BITE BLOCK ADULT OMNI-BLOC

## (undated) DEVICE — SPECIMEN BAG MEDIVAC SUCTION WHITE SOCK 65652-122

## (undated) DEVICE — BASIN SET SINGLE STERILE 13752-624

## (undated) DEVICE — SYR 30ML SLIP TIP W/O NDL 302833

## (undated) DEVICE — ENDO FORCEP ENDOJAW BIOPSY 2.8MMX160CM FB-220K

## (undated) DEVICE — SOL WATER IRRIG 1000ML BOTTLE 2F7114

## (undated) DEVICE — JELLY LUBRICATING SURGILUBE 2OZ TUBE

## (undated) RX ORDER — FENTANYL CITRATE 50 UG/ML
INJECTION, SOLUTION INTRAMUSCULAR; INTRAVENOUS
Status: DISPENSED
Start: 2018-01-03